# Patient Record
Sex: MALE | Race: WHITE | NOT HISPANIC OR LATINO
[De-identification: names, ages, dates, MRNs, and addresses within clinical notes are randomized per-mention and may not be internally consistent; named-entity substitution may affect disease eponyms.]

---

## 2017-01-18 ENCOUNTER — APPOINTMENT (OUTPATIENT)
Dept: SURGICAL ONCOLOGY | Facility: CLINIC | Age: 57
End: 2017-01-18

## 2017-01-18 VITALS
SYSTOLIC BLOOD PRESSURE: 160 MMHG | OXYGEN SATURATION: 98 % | DIASTOLIC BLOOD PRESSURE: 90 MMHG | WEIGHT: 230 LBS | RESPIRATION RATE: 18 BRPM | HEART RATE: 100 BPM | HEIGHT: 75 IN | BODY MASS INDEX: 28.6 KG/M2

## 2017-04-30 ENCOUNTER — FORM ENCOUNTER (OUTPATIENT)
Age: 57
End: 2017-04-30

## 2017-05-01 ENCOUNTER — APPOINTMENT (OUTPATIENT)
Dept: CT IMAGING | Facility: CLINIC | Age: 57
End: 2017-05-01

## 2017-05-01 ENCOUNTER — OUTPATIENT (OUTPATIENT)
Dept: OUTPATIENT SERVICES | Facility: HOSPITAL | Age: 57
LOS: 1 days | End: 2017-05-01
Payer: COMMERCIAL

## 2017-05-01 DIAGNOSIS — Z00.8 ENCOUNTER FOR OTHER GENERAL EXAMINATION: ICD-10-CM

## 2017-05-01 DIAGNOSIS — Z98.89 OTHER SPECIFIED POSTPROCEDURAL STATES: Chronic | ICD-10-CM

## 2017-05-01 DIAGNOSIS — K46.9 UNSPECIFIED ABDOMINAL HERNIA WITHOUT OBSTRUCTION OR GANGRENE: Chronic | ICD-10-CM

## 2017-05-01 DIAGNOSIS — Z90.49 ACQUIRED ABSENCE OF OTHER SPECIFIED PARTS OF DIGESTIVE TRACT: Chronic | ICD-10-CM

## 2017-05-02 PROCEDURE — 82565 ASSAY OF CREATININE: CPT

## 2017-05-02 PROCEDURE — 74177 CT ABD & PELVIS W/CONTRAST: CPT

## 2017-07-19 ENCOUNTER — APPOINTMENT (OUTPATIENT)
Dept: SURGICAL ONCOLOGY | Facility: CLINIC | Age: 57
End: 2017-07-19

## 2017-07-19 VITALS
RESPIRATION RATE: 12 BRPM | BODY MASS INDEX: 28.6 KG/M2 | HEIGHT: 75 IN | TEMPERATURE: 98.2 F | WEIGHT: 230 LBS | SYSTOLIC BLOOD PRESSURE: 120 MMHG | DIASTOLIC BLOOD PRESSURE: 83 MMHG | HEART RATE: 100 BPM | OXYGEN SATURATION: 98 %

## 2019-03-12 ENCOUNTER — TRANSCRIPTION ENCOUNTER (OUTPATIENT)
Age: 59
End: 2019-03-12

## 2019-04-03 ENCOUNTER — APPOINTMENT (OUTPATIENT)
Dept: SURGICAL ONCOLOGY | Facility: CLINIC | Age: 59
End: 2019-04-03

## 2019-06-26 ENCOUNTER — APPOINTMENT (OUTPATIENT)
Dept: SURGICAL ONCOLOGY | Facility: CLINIC | Age: 59
End: 2019-06-26
Payer: MEDICARE

## 2019-06-26 VITALS
HEIGHT: 75 IN | BODY MASS INDEX: 30.59 KG/M2 | SYSTOLIC BLOOD PRESSURE: 139 MMHG | DIASTOLIC BLOOD PRESSURE: 84 MMHG | WEIGHT: 246 LBS | HEART RATE: 99 BPM

## 2019-06-26 PROCEDURE — 99214 OFFICE O/P EST MOD 30 MIN: CPT

## 2019-06-26 NOTE — ASSESSMENT
[FreeTextEntry1] : 59 year-old male presents for follow up.  Initially referred by Hollie Paredes, who performed an x-lap, sigmoid resection, takedown of colovesical fistula and end colostomy (Hartmanns) for perforated diverticulitis on 8/3/16.  He wishes to discuss undergoing reversal of his colostomy.  \par \par

## 2019-06-26 NOTE — HISTORY OF PRESENT ILLNESS
[de-identified] : 59 year-old male presents for follow up.  Initially referred by Hollie Paredes, who performed an x-lap, sigmoid resection, takedown of colovesical fistula and end colostomy (Hartmanns) for perforated diverticulitis on 8/3/16.  He wishes to discuss undergoing reversal of his colostomy.  His medical history is notable for HTN, HLD and DM (controlled on Metformin).  He also has a h/o prostate cancer treated with radiation therapy.  Prior surgeries include a right-sided inguinal hernia repair by Dr. Knight. He has a family history of prostate cancer involving her brother.\par \par He is s/p cystoscopy, laparotomy, extensive MI and SBR on 11/29/16.  Intent of surgery was to reverse Shelley, however, due to extensive inflammation involving the posterior bladder trigone and rectal wall, decision was made to abort reversal.  Surgical pathology was benign.  \par \par He was referred to the [facility] for attempted takedown of colostomy on 6/29/17.  Unfortunately, extent of scarring prohibited the procedure and it was aborted.\par \par Preop CT performed in May 2017 revealed significant improvement in the colovesicular fistula.  There is no discrete drainable abscess.  There is persistently thickening posterior bladder wall with surrounding inflammatory changes, with no intravesical air to definitively suggest an active fistula.\par \par \par PCP: Dr. Jaiden Cochran ([phone])\par Referring MD: Dr. Browne\par

## 2019-06-26 NOTE — PHYSICAL EXAM
[Normal] : supple, no neck mass and thyroid not enlarged [Normal Neck Lymph Nodes] : normal neck lymph nodes  [Normal Supraclavicular Lymph Nodes] : normal supraclavicular lymph nodes [Normal Groin Lymph Nodes] : normal groin lymph nodes [Normal Axillary Lymph Nodes] : normal axillary lymph nodes [Normal] : grossly intact [de-identified] : LLQ colostomy with appliance- stoma viable

## 2019-06-26 NOTE — CONSULT LETTER
[Dear  ___] : Dear  [unfilled], [Courtesy Letter:] : I had the pleasure of seeing your patient, [unfilled], in my office today. [Sincerely,] : Sincerely, [DrBobbi  ___] : Dr. QUINTERO [FreeTextEntry2] : Naren Browne MD [FreeTextEntry1] : 59 year-old male presents for follow up.  Initially referred by Hollie Paredes, who performed an x-lap, sigmoid resection, takedown of colovesical fistula and end colostomy (Hartmanns) for perforated diverticulitis on 8/3/16.  He wishes to discuss undergoing reversal of his colostomy.  His medical history is notable for HTN, HLD and DM (controlled on Metformin).  He also has a h/o prostate cancer treated with radiation therapy.  Prior surgeries include a right-sided inguinal hernia repair by Dr. Knight. He has a family history of prostate cancer involving her brother.\par \par He is s/p cystoscopy, laparotomy, extensive MI and SBR on 11/29/16.  Intent of surgery was to reverse Shelley, however, due to extensive inflammation involving the posterior bladder trigone and rectal wall, decision was made to abort reversal.  Surgical pathology was benign.  \par \par He was referred to the [facility] for attempted takedown of colostomy on 6/29/17.  Unfortunately, extent of scarring prohibited the procedure and it was aborted.\par \par Preop CT performed in May 2017 revealed significant improvement in the colovesicular fistula.  There is no discrete drainable abscess.  There is persistently thickening posterior bladder wall with surrounding inflammatory changes, with no intravesical air to definitively suggest an active fistula.\par \par \par  [FreeTextEntry3] : Vel Parkinson MD, FACS, FASCRS\par , Department of Surgery\par Director of the [facility]\par , Minimally Invasive/Robotic Cancer Surgery, Central & Eastern Divisions\par Division of Surgical Oncology\par

## 2019-06-27 RX ORDER — PSYLLIUM HUSK 0.4 G
CAPSULE ORAL
Refills: 0 | Status: ACTIVE | COMMUNITY

## 2019-06-27 RX ORDER — HYDROCHLOROTHIAZIDE 25 MG/1
25 TABLET ORAL
Refills: 0 | Status: ACTIVE | COMMUNITY

## 2021-01-29 ENCOUNTER — APPOINTMENT (OUTPATIENT)
Dept: COLORECTAL SURGERY | Facility: CLINIC | Age: 61
End: 2021-01-29
Payer: MEDICARE

## 2021-01-29 VITALS
DIASTOLIC BLOOD PRESSURE: 90 MMHG | HEART RATE: 123 BPM | HEIGHT: 75 IN | WEIGHT: 244 LBS | TEMPERATURE: 98.2 F | BODY MASS INDEX: 30.34 KG/M2 | SYSTOLIC BLOOD PRESSURE: 190 MMHG

## 2021-01-29 DIAGNOSIS — I10 ESSENTIAL (PRIMARY) HYPERTENSION: ICD-10-CM

## 2021-01-29 DIAGNOSIS — Z80.9 FAMILY HISTORY OF MALIGNANT NEOPLASM, UNSPECIFIED: ICD-10-CM

## 2021-01-29 DIAGNOSIS — K62.7 RADIATION PROCTITIS: ICD-10-CM

## 2021-01-29 DIAGNOSIS — Z80.42 FAMILY HISTORY OF MALIGNANT NEOPLASM OF PROSTATE: ICD-10-CM

## 2021-01-29 PROCEDURE — 45300 PROCTOSIGMOIDOSCOPY DX: CPT

## 2021-01-29 PROCEDURE — 99204 OFFICE O/P NEW MOD 45 MIN: CPT | Mod: 25

## 2021-01-29 NOTE — ASSESSMENT
[FreeTextEntry1] : I have discussed with the patient that given his 2 prior attempts at unsuccessful reversal of this colostomy I do not support further attempts. The degree of fibrosis as described in prior operative reports increases the potential risk of secondary organ injury and complications as well as limits the success opportunities of reversal of colostomy. I recommended that he continue with his colostomy and defer further attempts at reversal. The risks, benefits and alternatives of treatment plan have been reviewed. All questions answered.

## 2021-01-29 NOTE — PHYSICAL EXAM
[Abdomen Masses] : No abdominal masses [Abdomen Tenderness] : ~T No ~M abdominal tenderness [Excoriation] : no perianal excoriation [Normal] : was normal [None] : no [de-identified] : Low healed midline incision with left lower quadrant colostomy [FreeTextEntry1] : A rigid proctosigmoidoscope was passed through he anus into the rectum to  8  cm. . The mucosal surface were inspected. 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 Moderate stenosis at 8 cm from the anal verge.

## 2021-01-29 NOTE — HISTORY OF PRESENT ILLNESS
[FreeTextEntry1] : 61 y/o M presents for evaluation of Shelley's reversal \par H/o prostate cancer, treated with radiation seeds in [place], [place]. Reports he has a plate placed near the pelvis r/t radiation causing area "being too soft" \par PSA trended with PCP\par \par Patient underwent exploratory laparotomy, takedown of colovesical fistula and Shelley's creation on 8/3/16 with Dr. Naren Browne at [facility] for perforated diverticulitis (1st episode)\par \par This procedure was followed by Colonoscopy, laparotomy, small bowel resection. MI, and cystoscopy and insertion of ureteral catheters on 11/29/16 with Dr. Vel Parkinson at [facility]. Per Dr. Parkinson's notes, intent of surgery was to reverse Shelley, however, due to extensive inflammation involving the posterior bladder trigone and rectal wall, decision was made to abort reversal.\par \par He also underwent a Shelley's reversal on 6/29/17 with Dr. Bruce Oconnell at [facility] but surgery was aborted 2/2 extensive scarring and in ability to create anastomosis. Consideration of pull-through but abandoned 2/2 risk of injury\par \par Last CT A/P completed 5/1/17\par - Significantly improved colovesicular fistula. Nonspecific soft tissue in the colovesical/coloprostatic space which may related to granulation fibrosis or phlegmon\par - No discrete drainable abscess. \par - Persistently thickened posterior bladder wall with surrounding inflammatory changes, no intravesical air to definitively suggest active fistula. \par \par Denies abdominal pain, nausea, vomiting, change in appetite or weight\par Denies bleeding or irritation from stoma. Changing appliance ~3 times per week\par BH: 2-5 times daily. Stools typically watery to paste like depending on diet. Rarely has a formed BM\par Working on dietary fiber intake. Drinking four 16 oz. bottles of water daily \par \par Last colonoscopy completed 2018, no abnormal findings per patient \par FMH of prostate cancer in 2 brothers. FMH of cancer in mother

## 2022-04-07 ENCOUNTER — APPOINTMENT (OUTPATIENT)
Dept: ORTHOPEDIC SURGERY | Facility: CLINIC | Age: 62
End: 2022-04-07
Payer: MEDICARE

## 2022-04-07 VITALS — WEIGHT: 235 LBS | BODY MASS INDEX: 29.22 KG/M2 | HEIGHT: 75 IN

## 2022-04-07 PROCEDURE — 99214 OFFICE O/P EST MOD 30 MIN: CPT | Mod: 25

## 2022-04-07 PROCEDURE — 20610 DRAIN/INJ JOINT/BURSA W/O US: CPT | Mod: RT

## 2022-04-07 NOTE — HISTORY OF PRESENT ILLNESS
[5] : 5 [Dull/Aching] : dull/aching [de-identified] : Has increased swelling and soreness again right knee. Also had swelling left hand, right ankle, PMD wants fluid sent to check for crystal [FreeTextEntry1] : rt knee [FreeTextEntry5] : patient reports swelling [de-identified] : none

## 2022-04-07 NOTE — PHYSICAL EXAM
[Right] : right knee [] : anterior tenderness [5___] : hamstring 5[unfilled]/5 [TWNoteComboBox7] : flexion 120 degrees [de-identified] : extension 0 degrees

## 2022-04-07 NOTE — PROCEDURE
[Large Joint Injection] : Large joint injection [Right] : of the right [Knee] : knee [Pain] : pain [Alcohol] : alcohol [Betadine] : betadine [Ethyl Chloride sprayed topically] : ethyl chloride sprayed topically [___ cc    3mg] :  Betamethasone (Celestone) ~Vcc of 3mg [___ cc    1%] : Lidocaine ~Vcc of 1%  [Effusion] : effusion [Crystals] : crystals [de-identified] : 60cc

## 2022-04-08 LAB
SYCRY CLARITY: ABNORMAL
SYCRY CLARITY: ABNORMAL
SYCRY COLOR: YELLOW
SYCRY COLOR: YELLOW
SYCRY ID: ABNORMAL
SYCRY ID: ABNORMAL
SYCRY TUBE: NORMAL
SYCRY TUBE: NORMAL

## 2022-04-21 ENCOUNTER — APPOINTMENT (OUTPATIENT)
Dept: ORTHOPEDIC SURGERY | Facility: CLINIC | Age: 62
End: 2022-04-21
Payer: MEDICARE

## 2022-04-21 PROCEDURE — 99213 OFFICE O/P EST LOW 20 MIN: CPT

## 2022-04-21 NOTE — PHYSICAL EXAM
[Right] : right knee [5___] : hamstring 5[unfilled]/5 [Negative] : negative Allyson's [] : no anterior tenderness [TWNoteComboBox7] : flexion 125 degrees [de-identified] : extension 0 degrees

## 2022-04-21 NOTE — HISTORY OF PRESENT ILLNESS
[5] : 5 [Dull/Aching] : dull/aching [FreeTextEntry5] : patient reports swelling [FreeTextEntry1] : rt knee [de-identified] : none [de-identified] : Has increased swelling and soreness again right knee. Also had swelling left hand, right ankle, PMD wants fluid sent to check for crystal

## 2022-04-21 NOTE — PROCEDURE
[Large Joint Injection] : Large joint injection [Right] : of the right [Knee] : knee [Pain] : pain [Alcohol] : alcohol [Betadine] : betadine [Ethyl Chloride sprayed topically] : ethyl chloride sprayed topically [___ cc    3mg] :  Betamethasone (Celestone) ~Vcc of 3mg [___ cc    1%] : Lidocaine ~Vcc of 1%  [Effusion] : effusion [Crystals] : crystals [de-identified] : 60cc

## 2022-04-21 NOTE — DISCUSSION/SUMMARY
[de-identified] : will discuss gouty diagnosis with PMD, may need to be placed on other medications

## 2022-06-28 ENCOUNTER — APPOINTMENT (OUTPATIENT)
Dept: ORTHOPEDIC SURGERY | Facility: CLINIC | Age: 62
End: 2022-06-28

## 2022-06-28 VITALS — HEIGHT: 75 IN | BODY MASS INDEX: 29.22 KG/M2 | WEIGHT: 235 LBS

## 2022-06-28 PROCEDURE — 20610 DRAIN/INJ JOINT/BURSA W/O US: CPT

## 2022-06-28 PROCEDURE — 99213 OFFICE O/P EST LOW 20 MIN: CPT | Mod: 25

## 2022-06-28 NOTE — PROCEDURE
[Large Joint Injection] : Large joint injection [Right] : of the right [Knee] : knee [Pain] : pain [Alcohol] : alcohol [Betadine] : betadine [Ethyl Chloride sprayed topically] : ethyl chloride sprayed topically [Inflammation] : inflammation [X-ray evidence of Osteoarthritis on this or prior visit] : x-ray evidence of Osteoarthritis on this or prior visit [Orthovisc] : Orthovisc [#1] : series #1 [] : Patient tolerated procedure well [Call if redness, pain or fever occur] : call if redness, pain or fever occur [Apply ice for 15min out of every hour for the next 12-24 hours as tolerated] : apply ice for 15 minutes out of every hour for the next 12-24 hours as tolerated [Patient was advised to rest the joint(s) for ____ days] : patient was advised to rest the joint(s) for [unfilled] days

## 2022-06-28 NOTE — HISTORY OF PRESENT ILLNESS
[5] : 5 [Dull/Aching] : dull/aching [de-identified] : 6/28/22- here with recurring right medial knee pain x a couple days. Requesting asp/injection. Concerned he will be standing prolonged for work. Worried knee may give out. No recent episodes of buckling. \par \par 4/28/22- Has increased swelling and soreness again right knee. Also had swelling left hand, right ankle, PMD wants fluid sent to check for crystal\par 12-1-21- right knee pain and swelling\par 9/8/21- Feels like the gels kicked in from a pain perspective, but swelled a bit\par 8/17/21- Went on treadmill, right knee swelled again\par 7/22/21: Here for R knee Orthovisc #4, swelling in the knee has returned\par History of Present Illness\par 12-1-21- Known right knee oa and recurrent effusions. does well with episodic asp/inject. was last asp in Sept last csi\par was in august. recently pain and swelling has returned. \par 7/5/2021: Pt here due to right knee swelling. Pt states that he recently began use of Aleve otc and has noted\par significant pain relief. Pt is inquiring regarding getting 2nd Orthovisc today.\par 3 weeks ago, pain has subsided. He has stiffness going up and down the steps or bending. No locking, clicking or giving\par out. Tylenol with some help. Has hx gout.\par 7/31/19- Had small twist to right knee 2 days ago, has pain and swelling. Has h/o gout, had right knee scoped for\par medial meniscal tear 2 years ago, has been good up til now. [] : no [FreeTextEntry1] : rt knee [FreeTextEntry5] : patient reports swelling [de-identified] : none

## 2022-06-28 NOTE — ASSESSMENT
[FreeTextEntry1] : Too soon to repeat CSI, may have another csi after 7/28/22.\par Orthovisc #1 given today.\par Post injection instructions.\par Breaks from prolonged standing every hour.\par Saw PMD - Dr. Cochran who started him on a med for his gout, will f/u one month with him as scheduled.\par Discussed avoiding foods for gout prevention.\par Return one week to continue series.

## 2022-06-28 NOTE — PHYSICAL EXAM
[Right] : right knee [5___] : hamstring 5[unfilled]/5 [Negative] : negative Allyson's [] : no pain with valgus stress [FreeTextEntry9] : medial pain with flexion and extension [TWNoteComboBox7] : flexion 125 degrees [de-identified] : extension 0 degrees

## 2022-07-07 ENCOUNTER — APPOINTMENT (OUTPATIENT)
Dept: ORTHOPEDIC SURGERY | Facility: CLINIC | Age: 62
End: 2022-07-07

## 2022-07-07 DIAGNOSIS — M25.561 PAIN IN RIGHT KNEE: ICD-10-CM

## 2022-07-07 PROCEDURE — 20610 DRAIN/INJ JOINT/BURSA W/O US: CPT | Mod: RT

## 2022-07-07 NOTE — HISTORY OF PRESENT ILLNESS
[5] : 5 [Dull/Aching] : dull/aching [de-identified] : 7-7-22- for continued orthovisc injection right knee #2\par \par 6/28/22- here with recurring right medial knee pain x a couple days. Requesting asp/injection. Concerned he will be standing prolonged for work. Worried knee may give out. No recent episodes of buckling. \par \par 4/28/22- Has increased swelling and soreness again right knee. Also had swelling left hand, right ankle, PMD wants fluid sent to check for crystal\par 12-1-21- right knee pain and swelling\par 9/8/21- Feels like the gels kicked in from a pain perspective, but swelled a bit\par 8/17/21- Went on treadmill, right knee swelled again\par 7/22/21: Here for R knee Orthovisc #4, swelling in the knee has returned\par History of Present Illness\par 12-1-21- Known right knee oa and recurrent effusions. does well with episodic asp/inject. was last asp in Sept last csi\par was in august. recently pain and swelling has returned. \par 7/5/2021: Pt here due to right knee swelling. Pt states that he recently began use of Aleve otc and has noted\par significant pain relief. Pt is inquiring regarding getting 2nd Orthovisc today.\par 3 weeks ago, pain has subsided. He has stiffness going up and down the steps or bending. No locking, clicking or giving\par out. Tylenol with some help. Has hx gout.\par 7/31/19- Had small twist to right knee 2 days ago, has pain and swelling. Has h/o gout, had right knee scoped for\par medial meniscal tear 2 years ago, has been good up til now. [] : no [FreeTextEntry1] : rt knee [FreeTextEntry5] : patient reports swelling [de-identified] : none

## 2022-07-07 NOTE — PROCEDURE
[Large Joint Injection] : Large joint injection [Right] : of the right [Knee] : knee [Pain] : pain [Inflammation] : inflammation [X-ray evidence of Osteoarthritis on this or prior visit] : x-ray evidence of Osteoarthritis on this or prior visit [Alcohol] : alcohol [Betadine] : betadine [Ethyl Chloride sprayed topically] : ethyl chloride sprayed topically [Orthovisc] : Orthovisc [#2] : series #2 [] : Patient tolerated procedure well [Call if redness, pain or fever occur] : call if redness, pain or fever occur [Apply ice for 15min out of every hour for the next 12-24 hours as tolerated] : apply ice for 15 minutes out of every hour for the next 12-24 hours as tolerated [Patient was advised to rest the joint(s) for ____ days] : patient was advised to rest the joint(s) for [unfilled] days

## 2022-07-14 ENCOUNTER — APPOINTMENT (OUTPATIENT)
Dept: ORTHOPEDIC SURGERY | Facility: CLINIC | Age: 62
End: 2022-07-14

## 2022-07-14 PROCEDURE — 20610 DRAIN/INJ JOINT/BURSA W/O US: CPT

## 2022-07-14 PROCEDURE — 99213 OFFICE O/P EST LOW 20 MIN: CPT | Mod: 25

## 2022-07-14 NOTE — PHYSICAL EXAM
[Right] : right knee [5___] : hamstring 5[unfilled]/5 [Negative] : negative Allyson's [] : no pain with valgus stress [FreeTextEntry9] : medial pain with flexion and extension [TWNoteComboBox7] : flexion 125 degrees [de-identified] : extension 0 degrees

## 2022-07-14 NOTE — PROCEDURE
[Large Joint Injection] : Large joint injection [Right] : of the right [Knee] : knee [Pain] : pain [Inflammation] : inflammation [X-ray evidence of Osteoarthritis on this or prior visit] : x-ray evidence of Osteoarthritis on this or prior visit [Alcohol] : alcohol [Betadine] : betadine [Ethyl Chloride sprayed topically] : ethyl chloride sprayed topically [Orthovisc] : Orthovisc [#3] : series #3 [] : Patient tolerated procedure well [Call if redness, pain or fever occur] : call if redness, pain or fever occur [Apply ice for 15min out of every hour for the next 12-24 hours as tolerated] : apply ice for 15 minutes out of every hour for the next 12-24 hours as tolerated [Patient was advised to rest the joint(s) for ____ days] : patient was advised to rest the joint(s) for [unfilled] days

## 2022-07-14 NOTE — HISTORY OF PRESENT ILLNESS
[5] : 5 [Dull/Aching] : dull/aching [de-identified] : 7-7-22- for continued orthovisc injection right knee #2\par \par 6/28/22- here with recurring right medial knee pain x a couple days. Requesting asp/injection. Concerned he will be standing prolonged for work. Worried knee may give out. No recent episodes of buckling. \par \par 4/28/22- Has increased swelling and soreness again right knee. Also had swelling left hand, right ankle, PMD wants fluid sent to check for crystal\par 12-1-21- right knee pain and swelling\par 9/8/21- Feels like the gels kicked in from a pain perspective, but swelled a bit\par 8/17/21- Went on treadmill, right knee swelled again\par 7/22/21: Here for R knee Orthovisc #4, swelling in the knee has returned\par History of Present Illness\par 12-1-21- Known right knee oa and recurrent effusions. does well with episodic asp/inject. was last asp in Sept last csi\par was in august. recently pain and swelling has returned. \par 7/5/2021: Pt here due to right knee swelling. Pt states that he recently began use of Aleve otc and has noted\par significant pain relief. Pt is inquiring regarding getting 2nd Orthovisc today.\par 3 weeks ago, pain has subsided. He has stiffness going up and down the steps or bending. No locking, clicking or giving\par out. Tylenol with some help. Has hx gout.\par 7/31/19- Had small twist to right knee 2 days ago, has pain and swelling. Has h/o gout, had right knee scoped for\par medial meniscal tear 2 years ago, has been good up til now. [] : no [FreeTextEntry1] : rt knee [FreeTextEntry5] : patient reports swelling [de-identified] : none

## 2022-07-21 ENCOUNTER — APPOINTMENT (OUTPATIENT)
Dept: ORTHOPEDIC SURGERY | Facility: CLINIC | Age: 62
End: 2022-07-21

## 2022-07-21 PROCEDURE — 99213 OFFICE O/P EST LOW 20 MIN: CPT | Mod: 25

## 2022-07-21 PROCEDURE — 20610 DRAIN/INJ JOINT/BURSA W/O US: CPT

## 2022-07-21 NOTE — PHYSICAL EXAM
[Right] : right knee [5___] : hamstring 5[unfilled]/5 [Negative] : negative Allyson's [] : no pain with valgus stress [FreeTextEntry9] : medial pain with flexion and extension [TWNoteComboBox7] : flexion 125 degrees [de-identified] : extension 0 degrees

## 2022-07-21 NOTE — PROCEDURE
[Large Joint Injection] : Large joint injection [Right] : of the right [Knee] : knee [Pain] : pain [Inflammation] : inflammation [X-ray evidence of Osteoarthritis on this or prior visit] : x-ray evidence of Osteoarthritis on this or prior visit [Alcohol] : alcohol [Betadine] : betadine [Ethyl Chloride sprayed topically] : ethyl chloride sprayed topically [Orthovisc] : Orthovisc [#4] : series #4 [] : Patient tolerated procedure well [Call if redness, pain or fever occur] : call if redness, pain or fever occur [Apply ice for 15min out of every hour for the next 12-24 hours as tolerated] : apply ice for 15 minutes out of every hour for the next 12-24 hours as tolerated [Patient was advised to rest the joint(s) for ____ days] : patient was advised to rest the joint(s) for [unfilled] days

## 2022-07-21 NOTE — HISTORY OF PRESENT ILLNESS
[5] : 5 [Dull/Aching] : dull/aching [de-identified] : 7-7-22- for continued orthovisc injection right knee #2\par \par 6/28/22- here with recurring right medial knee pain x a couple days. Requesting asp/injection. Concerned he will be standing prolonged for work. Worried knee may give out. No recent episodes of buckling. \par \par 4/28/22- Has increased swelling and soreness again right knee. Also had swelling left hand, right ankle, PMD wants fluid sent to check for crystal\par 12-1-21- right knee pain and swelling\par 9/8/21- Feels like the gels kicked in from a pain perspective, but swelled a bit\par 8/17/21- Went on treadmill, right knee swelled again\par 7/22/21: Here for R knee Orthovisc #4, swelling in the knee has returned\par History of Present Illness\par 12-1-21- Known right knee oa and recurrent effusions. does well with episodic asp/inject. was last asp in Sept last csi\par was in august. recently pain and swelling has returned. \par 7/5/2021: Pt here due to right knee swelling. Pt states that he recently began use of Aleve otc and has noted\par significant pain relief. Pt is inquiring regarding getting 2nd Orthovisc today.\par 3 weeks ago, pain has subsided. He has stiffness going up and down the steps or bending. No locking, clicking or giving\par out. Tylenol with some help. Has hx gout.\par 7/31/19- Had small twist to right knee 2 days ago, has pain and swelling. Has h/o gout, had right knee scoped for\par medial meniscal tear 2 years ago, has been good up til now. [] : no [FreeTextEntry1] : rt knee [FreeTextEntry5] : patient reports swelling [de-identified] : none

## 2023-03-03 ENCOUNTER — APPOINTMENT (OUTPATIENT)
Dept: ORTHOPEDIC SURGERY | Facility: CLINIC | Age: 63
End: 2023-03-03
Payer: MEDICARE

## 2023-03-03 VITALS — HEIGHT: 75 IN | BODY MASS INDEX: 29.22 KG/M2 | WEIGHT: 235 LBS

## 2023-03-03 DIAGNOSIS — M10.9 GOUT, UNSPECIFIED: ICD-10-CM

## 2023-03-03 DIAGNOSIS — M17.11 UNILATERAL PRIMARY OSTEOARTHRITIS, RIGHT KNEE: ICD-10-CM

## 2023-03-03 DIAGNOSIS — M25.461 EFFUSION, RIGHT KNEE: ICD-10-CM

## 2023-03-03 PROCEDURE — 99213 OFFICE O/P EST LOW 20 MIN: CPT | Mod: 25

## 2023-03-03 PROCEDURE — 20610 DRAIN/INJ JOINT/BURSA W/O US: CPT | Mod: RT

## 2023-03-03 NOTE — HISTORY OF PRESENT ILLNESS
[5] : 5 [Dull/Aching] : dull/aching [de-identified] : \par 3/3/23-knee swelled again\par 7-21-22 for continued orthovisc injection right knee #3\par 7-14-22- for continued orthovisc injection right knee #3\par 7-7-22- for continued orthovisc injection right knee #2\par 6/28/22- here with recurring right medial knee pain x a couple days. Requesting asp/injection. Concerned he will be standing prolonged for work. Worried knee may give out. No recent episodes of buckling. \par 4/28/22- Has increased swelling and soreness again right knee. Also had swelling left hand, right ankle, PMD wants fluid sent to check for crystal\par 12-1-21- right knee pain and swelling\par 9/8/21- Feels like the gels kicked in from a pain perspective, but swelled a bit\par 8/17/21- Went on treadmill, right knee swelled again\par 7/22/21: Here for R knee Orthovisc #4, swelling in the knee has returned\par History of Present Illness\par 12-1-21- Known right knee oa and recurrent effusions. does well with episodic asp/inject. was last asp in Sept last csi\par was in august. recently pain and swelling has returned. \par 7/5/2021: Pt here due to right knee swelling. Pt states that he recently began use of Aleve otc and has noted\par significant pain relief. Pt is inquiring regarding getting 2nd Orthovisc today.\par 3 weeks ago, pain has subsided. He has stiffness going up and down the steps or bending. No locking, clicking or giving\par out. Tylenol with some help. Has hx gout.\par 7/31/19- Had small twist to right knee 2 days ago, has pain and swelling. Has h/o gout, had right knee scoped for\par medial meniscal tear 2 years ago, has been good up til now. [] : no [FreeTextEntry1] : rt knee [FreeTextEntry5] : patient reports swelling [de-identified] : none

## 2023-03-03 NOTE — PROCEDURE
[Large Joint Injection] : Large joint injection [Right] : of the right [Knee] : knee [Pain] : pain [Inflammation] : inflammation [X-ray evidence of Osteoarthritis on this or prior visit] : x-ray evidence of Osteoarthritis on this or prior visit [Alcohol] : alcohol [Betadine] : betadine [Ethyl Chloride sprayed topically] : ethyl chloride sprayed topically [] : Patient tolerated procedure well [Call if redness, pain or fever occur] : call if redness, pain or fever occur [Apply ice for 15min out of every hour for the next 12-24 hours as tolerated] : apply ice for 15 minutes out of every hour for the next 12-24 hours as tolerated [Patient was advised to rest the joint(s) for ____ days] : patient was advised to rest the joint(s) for [unfilled] days [___ cc    6mg] :  Betamethasone (Celestone) ~Vcc of 6mg [___ cc    1%] : Lidocaine ~Vcc of 1%  [Effusion] : effusion [de-identified] : 60cc [de-identified] : cloudy

## 2023-03-03 NOTE — PHYSICAL EXAM
[Right] : right knee [5___] : hamstring 5[unfilled]/5 [Negative] : negative Allyson's [] : no pain with valgus stress [FreeTextEntry9] : medial pain with flexion and extension [TWNoteComboBox7] : flexion 125 degrees [de-identified] : extension 0 degrees

## 2023-06-05 ENCOUNTER — APPOINTMENT (OUTPATIENT)
Dept: ORTHOPEDIC SURGERY | Facility: CLINIC | Age: 63
End: 2023-06-05
Payer: MEDICARE

## 2023-06-05 VITALS — WEIGHT: 235 LBS | BODY MASS INDEX: 29.22 KG/M2 | HEIGHT: 75 IN

## 2023-06-05 DIAGNOSIS — M23.92 UNSPECIFIED INTERNAL DERANGEMENT OF LEFT KNEE: ICD-10-CM

## 2023-06-05 DIAGNOSIS — M25.462 EFFUSION, LEFT KNEE: ICD-10-CM

## 2023-06-05 DIAGNOSIS — M17.12 UNILATERAL PRIMARY OSTEOARTHRITIS, LEFT KNEE: ICD-10-CM

## 2023-06-05 PROCEDURE — 20610 DRAIN/INJ JOINT/BURSA W/O US: CPT | Mod: LT

## 2023-06-05 PROCEDURE — 99213 OFFICE O/P EST LOW 20 MIN: CPT | Mod: 25

## 2023-06-05 PROCEDURE — 73562 X-RAY EXAM OF KNEE 3: CPT | Mod: LT

## 2023-06-05 NOTE — ASSESSMENT
[FreeTextEntry1] : will aspirate and administer csi left knee see how he does\par get him in hinged knee brace for ambulation\par possible ha inj if symptoms warrant

## 2023-06-05 NOTE — HISTORY OF PRESENT ILLNESS
[Sudden] : sudden [Radiating] : radiating [Frequent] : frequent [de-identified] : 6-5-23- known left knee oa has had prior arthroscopy. states yesterday getting up from seated position felt a pop and has had pain and swelling since ambulating with a limp. He is wearing a wrap around support  [] : no [FreeTextEntry1] : left knee  [FreeTextEntry3] : 6/4/23 [FreeTextEntry5] : patient felt a pop while getting into his car.  [FreeTextEntry7] : into the calf

## 2023-06-05 NOTE — PROCEDURE
[Large Joint Injection] : Large joint injection [Left] : of the left [Knee] : knee [Pain] : pain [Inflammation] : inflammation [X-ray evidence of Osteoarthritis on this or prior visit] : x-ray evidence of Osteoarthritis on this or prior visit [Alcohol] : alcohol [Betadine] : betadine [Ethyl Chloride sprayed topically] : ethyl chloride sprayed topically [Sterile technique used] : sterile technique used [___ cc    6mg] :  Betamethasone (Celestone) ~Vcc of 6mg [___ cc    0.5%] : Bupivacaine (Marcaine) ~Vcc of 0.5%  [Effusion] : effusion [] : Patient tolerated procedure well [Call if redness, pain or fever occur] : call if redness, pain or fever occur [Apply ice for 15min out of every hour for the next 12-24 hours as tolerated] : apply ice for 15 minutes out of every hour for the next 12-24 hours as tolerated [Patient was advised to rest the joint(s) for ____ days] : patient was advised to rest the joint(s) for [unfilled] days [Previous OTC use and PT nontherapeutic] : patient has tried OTC's including aspirin, Ibuprofen, Aleve, etc or prescription NSAIDS, and/or exercises at home and/or physical therapy without satisfactory response [Patient had decreased mobility in the joint] : patient had decreased mobility in the joint [Risks, benefits, alternatives discussed / Verbal consent obtained] : the risks benefits, and alternatives have been discussed, and verbal consent was obtained [de-identified] : 45 cc clear yellow

## 2023-06-05 NOTE — IMAGING
[Left] : left knee [AP] : anteroposterior [Lateral] : lateral [[place]] : skyline [advanced tricompartmental OA with medial compartment narrowing and varus alignment] : advanced tricompartmental OA with medial compartment narrowing and varus alignment

## 2023-06-05 NOTE — PHYSICAL EXAM
[NL (0)] : extension 0 degrees [5___] : hamstring 5[unfilled]/5 [Positive] : positive Bia [] : negative Lachmann [Left] : left knee [AP] : anteroposterior [Lateral] : lateral [Degenerative change] : Degenerative change [TWNoteComboBox7] : flexion 90 degrees

## 2024-01-11 ENCOUNTER — APPOINTMENT (OUTPATIENT)
Dept: SURGICAL ONCOLOGY | Facility: CLINIC | Age: 64
End: 2024-01-11
Payer: MEDICARE

## 2024-01-11 ENCOUNTER — NON-APPOINTMENT (OUTPATIENT)
Age: 64
End: 2024-01-11

## 2024-01-11 VITALS
SYSTOLIC BLOOD PRESSURE: 142 MMHG | BODY MASS INDEX: 29.22 KG/M2 | DIASTOLIC BLOOD PRESSURE: 80 MMHG | HEIGHT: 75 IN | OXYGEN SATURATION: 98 % | HEART RATE: 113 BPM | WEIGHT: 235 LBS

## 2024-01-11 DIAGNOSIS — K57.92 DIVERTICULITIS OF INTESTINE, PART UNSPECIFIED, W/OUT PERFORATION OR ABSCESS W/OUT BLEEDING: ICD-10-CM

## 2024-01-11 PROCEDURE — 99203 OFFICE O/P NEW LOW 30 MIN: CPT

## 2024-01-15 NOTE — HISTORY OF PRESENT ILLNESS
[de-identified] : Mr. Rawls is a 62 y/o male with a history of sigmoid diverticulitis/colovesical fistula who is s/p Hewitt's procedure 08/2016. He is s/p 2 aborted reversal attempts in 11/2016 by Dr. Parkinson and again 06/29/2017 at [facility] by Dr. Oconnell due to severe pelvic fibrosis/scar tissue. He had a colonoscopy 2018 without significant findings. He presents for evaluation of colostomy prolapse and stoma appliance related skin irritation. He is tolerating diet well and denies abdominal pain.  He is able to reduce prolapse and is managing colostomy well.

## 2024-01-15 NOTE — REVIEW OF SYSTEMS
[Negative] : Heme/Lymph [FreeTextEntry8] : colostomy prolapse with surrounding abdominal wall bulging

## 2024-01-15 NOTE — PHYSICAL EXAM
[FreeTextEntry1] : Abdomen: soft, nontender.  Viable stoma prolapse about 3 inches/reducible with stool output.  Large surrounding parastomal bulge is consistent with parastomal hernia. [Normal] : supple, no neck mass and thyroid not enlarged [Normal Neck Lymph Nodes] : normal neck lymph nodes  [Normal Supraclavicular Lymph Nodes] : normal supraclavicular lymph nodes [Normal Groin Lymph Nodes] : normal groin lymph nodes [Normal Axillary Lymph Nodes] : normal axillary lymph nodes [Normal] : oriented to person, place and time, with appropriate affect

## 2024-01-15 NOTE — ASSESSMENT
[FreeTextEntry1] : Obtain CT evaluation of parastomal hernia. Poor candidate for stoma reversal - reassess after CT. Refer to colorectal surgery, Dr. Tan Ramos, for colonoscopy/rectal stump evaluation after CT.

## 2024-01-24 ENCOUNTER — APPOINTMENT (OUTPATIENT)
Dept: CT IMAGING | Facility: CLINIC | Age: 64
End: 2024-01-24
Payer: MEDICARE

## 2024-01-24 PROCEDURE — 74177 CT ABD & PELVIS W/CONTRAST: CPT

## 2024-02-21 ENCOUNTER — APPOINTMENT (OUTPATIENT)
Dept: DERMATOLOGY | Facility: CLINIC | Age: 64
End: 2024-02-21
Payer: MEDICARE

## 2024-02-21 DIAGNOSIS — L30.9 DERMATITIS, UNSPECIFIED: ICD-10-CM

## 2024-02-21 PROCEDURE — 99204 OFFICE O/P NEW MOD 45 MIN: CPT

## 2024-02-21 RX ORDER — CLOBETASOL PROPIONATE 0.5 MG/G
0.05 OINTMENT TOPICAL TWICE DAILY
Qty: 2 | Refills: 2 | Status: ACTIVE | COMMUNITY
Start: 2024-02-21 | End: 1900-01-01

## 2024-02-22 ENCOUNTER — APPOINTMENT (OUTPATIENT)
Dept: SURGICAL ONCOLOGY | Facility: CLINIC | Age: 64
End: 2024-02-22
Payer: MEDICARE

## 2024-02-22 VITALS
WEIGHT: 230 LBS | HEART RATE: 109 BPM | DIASTOLIC BLOOD PRESSURE: 83 MMHG | BODY MASS INDEX: 28.6 KG/M2 | SYSTOLIC BLOOD PRESSURE: 165 MMHG | OXYGEN SATURATION: 98 % | HEIGHT: 75 IN

## 2024-02-22 DIAGNOSIS — C85.97: ICD-10-CM

## 2024-02-22 DIAGNOSIS — K43.5 PARASTOMAL HERNIA WITHOUT OBSTRUCTION OR GANGRENE: ICD-10-CM

## 2024-02-22 DIAGNOSIS — R16.1 SPLENOMEGALY, NOT ELSEWHERE CLASSIFIED: ICD-10-CM

## 2024-02-22 PROCEDURE — 99213 OFFICE O/P EST LOW 20 MIN: CPT

## 2024-02-28 PROBLEM — R16.1 SPLENOMEGALY: Status: ACTIVE | Noted: 2024-02-28

## 2024-02-28 PROBLEM — C85.97: Status: ACTIVE | Noted: 2024-02-28

## 2024-02-28 PROBLEM — K43.5 PARASTOMAL HERNIA: Status: ACTIVE | Noted: 2024-01-15

## 2024-02-28 NOTE — PHYSICAL EXAM
[FreeTextEntry1] : Abdomen: soft, nontender.  Viable stoma prolapse about 3 inches/reducible with stool output.  Large surrounding parastomal bulge is consistent with parastomal hernia. [Normal] : supple, no neck mass and thyroid not enlarged [Normal Neck Lymph Nodes] : normal neck lymph nodes  [Normal Groin Lymph Nodes] : normal groin lymph nodes [Normal Axillary Lymph Nodes] : normal axillary lymph nodes [Normal Supraclavicular Lymph Nodes] : normal supraclavicular lymph nodes [Normal] : oriented to person, place and time, with appropriate affect

## 2024-02-28 NOTE — HISTORY OF PRESENT ILLNESS
[de-identified] : Mr. Rawls is a 64 y/o male with a history of sigmoid diverticulitis/colovesical fistula who is s/p Hewitt's procedure 08/2016. He is s/p 2 aborted reversal attempts in 11/2016 by Dr. Parkinson and again 06/29/2017 at [facility] by Dr. Oconnell due to severe pelvic fibrosis/scar tissue. He had a colonoscopy 2018 without significant findings. He presents for evaluation of colostomy prolapse and stoma appliance related skin irritation. He is tolerating diet well and denies abdominal pain.  He is able to reduce prolapse and is managing colostomy well.  02/22/2024: Patient reports feeling well.  His colostomy is functioning despite significant prolapse.  He has minimal improved in skin excoriation/ulceration around stoma. CT abdomen/pelvis 01/24/2024 shows new periportal edema/thickening, splenomegaly and bilateral diffuse infiltrative soft tissue throughout collecting system and kidneys.  He has persistent rectovesical fistula.  Colostomy prolapse and large parastomal hernia seen.

## 2024-02-28 NOTE — ASSESSMENT
[FreeTextEntry1] : Persistent rectovesical fistula - not a candidate colostomy reversal. New soft tissue infiltration around kidneys with new splenomegaly - findings concerning for lymphoma.  Will plan PET/CT scan for evaluation and to determine site for biopsy based on FDG activity. Prolapsed colostomy with parastomal hernia - will refer to Dr. Ramos - colorectal surgery for revisional surgery. Follow up after PET/CT scan.

## 2024-02-28 NOTE — REASON FOR VISIT
[Follow-Up Visit] : a follow-up visit for [FreeTextEntry2] : colostomy prolapse with large parastomal hernia

## 2024-03-01 NOTE — HISTORY OF PRESENT ILLNESS
[FreeTextEntry1] : np [de-identified] : 62 yo M w/ hx of s igmoid diverticulitis/colovesical fistula who is s/p Hewitt's procedure 08/2016 and multiple subsequent failed reversal attempts, rcent course comploicated by prolapsed colostomy due to reducible hernia, here for new perstomal skin changes.   Follows w/ Dr. Vargas (colorectal sx), Lucia Guevara (ostomy wound care)  Pt denies pain, not entirely clear how long peristomal ulcer has been present, but says it occurred at same time as hernia, has more recently been using a larger wafer which has seemed to help.

## 2024-03-01 NOTE — PHYSICAL EXAM
[FreeTextEntry3] : peristomal ulceration most prominent on right side but laso present on left and inferiorly, no undermining, minimal discharge, no inflamed border, non tender, red beefy granulation tissue at base, stoma appears prolapsed, large background hernia

## 2024-03-01 NOTE — ASSESSMENT
[FreeTextEntry1] : #favor ulceration 2/2 irritant dermatitis-- likely triggered by stoma prolapse from large hernia Low suspicion today for PG given lack of pain, undermining/red border & rapid progression New diagnosis with uncertain prognosis - education, counseling - START clobetasol ointment BID to affected area only, no more than 2 weeks, avoid face and groin - r/b/a topical steroids were discussed including but not limited to thinning of the skin, atrophy and dyspigmentation. - photos taken - if worsening noted at NV in 3 weeks consider biopsy - surgical correction of hernia likely to improve peristomal wound

## 2024-03-14 ENCOUNTER — APPOINTMENT (OUTPATIENT)
Dept: COLORECTAL SURGERY | Facility: CLINIC | Age: 64
End: 2024-03-14
Payer: MEDICARE

## 2024-03-14 VITALS
HEIGHT: 75 IN | TEMPERATURE: 99 F | RESPIRATION RATE: 12 BRPM | HEART RATE: 100 BPM | SYSTOLIC BLOOD PRESSURE: 142 MMHG | BODY MASS INDEX: 28.6 KG/M2 | WEIGHT: 230 LBS | DIASTOLIC BLOOD PRESSURE: 72 MMHG

## 2024-03-14 DIAGNOSIS — Z87.39 PERSONAL HISTORY OF OTHER DISEASES OF THE MUSCULOSKELETAL SYSTEM AND CONNECTIVE TISSUE: ICD-10-CM

## 2024-03-14 DIAGNOSIS — Z78.9 OTHER SPECIFIED HEALTH STATUS: ICD-10-CM

## 2024-03-14 DIAGNOSIS — K94.09 OTHER COMPLICATIONS OF COLOSTOMY: ICD-10-CM

## 2024-03-14 DIAGNOSIS — Z85.46 PERSONAL HISTORY OF MALIGNANT NEOPLASM OF PROSTATE: ICD-10-CM

## 2024-03-14 DIAGNOSIS — E11.9 TYPE 2 DIABETES MELLITUS W/OUT COMPLICATIONS: ICD-10-CM

## 2024-03-14 PROCEDURE — 99203 OFFICE O/P NEW LOW 30 MIN: CPT

## 2024-03-14 RX ORDER — COLCHICINE 0.6 MG/1
0.6 CAPSULE ORAL
Refills: 0 | Status: ACTIVE | COMMUNITY

## 2024-03-14 RX ORDER — ALLOPURINOL 100 MG/1
100 TABLET ORAL
Refills: 0 | Status: ACTIVE | COMMUNITY

## 2024-03-14 RX ORDER — TURM/GING/BOS/YUC/WIL/CHAM/HOR 100-100 MG
TABLET ORAL
Refills: 0 | Status: ACTIVE | COMMUNITY

## 2024-03-14 NOTE — ASSESSMENT
[FreeTextEntry1] : Colostomy prolapse and parastomal hernia -We discussed treatment options for patient's colostomy prolapse and parastomal hernia. This included local repairs as well as mesh repairs and re\re siting of the stoma. Risks and benefits of each of these options were reviewed including postprocedural discomfort, pain and recurrence rates -Patient expressed interest in proceeding with a local repair although a higher recurrence rate would be noted. This would include partial colon resection with possible keyhole mesh placement and reduction of any involved small intestines -Patient wishes to proceed and we'll schedule his convenience -CT scan images and report reviewed. We'll discuss with surgical oncology treatment going forward for lymphadenopathy before definitive scheduling

## 2024-03-14 NOTE — CONSULT LETTER
[Dear  ___] : Dear  [unfilled], [Consult Letter:] : I had the pleasure of evaluating your patient, [unfilled]. [Please see my note below.] : Please see my note below. [Sincerely,] : Sincerely, [FreeTextEntry2] : Tan Vargas [Consult Closing:] : Thank you very much for allowing me to participate in the care of this patient.  If you have any questions, please do not hesitate to contact me. [FreeTextEntry3] : Tan Ramos MD FACS Chief Colon and Rectal Surgery [facility] [facility]

## 2024-03-14 NOTE — REVIEW OF SYSTEMS
[As Noted in HPI] : as noted in HPI [Nocturia] : nocturia [Negative] : Psychiatric [Chest Pain] : no chest pain [Shortness Of Breath] : no shortness of breath [Easy Bleeding] : no tendency for easy bleeding [FreeTextEntry9] : joint pain [Easy Bruising] : no tendency for easy bruising [de-identified] : denies glucose FS

## 2024-03-14 NOTE — HISTORY OF PRESENT ILLNESS
[FreeTextEntry1] : 62yo WM, s/p Shelley procedure in 2016. Has had 2 aborted attempts at reversal. Presents for evaluation of parastomal hernia And colostomy prolapse(denies abdominal pain. +ostomy output).  The stoma is working without event however patient is having difficulty with pouching. He denies abdominal pain no fevers or chills no nausea or vomiting. No aggravating factors

## 2024-03-14 NOTE — PHYSICAL EXAM
[Normal Breath Sounds] : Normal breath sounds [Normal Heart Sounds] : normal heart sounds [Normal Rate and Rhythm] : normal rate and rhythm [No Edema] : No edema [Alert] : alert [Oriented to Person] : oriented to person [Oriented to Place] : oriented to place [Oriented to Time] : oriented to time [Calm] : calm [de-identified] : round soft +BS LLQ ostomy/parastomal hernia.Colostomy prolapse easily reducible Healed scars [de-identified] : NC/AT [de-identified] : +ROM [de-identified] : intact

## 2024-03-15 RX ORDER — METRONIDAZOLE 250 MG/1
250 TABLET ORAL
Qty: 3 | Refills: 0 | Status: ACTIVE | COMMUNITY
Start: 2024-03-15 | End: 1900-01-01

## 2024-03-15 RX ORDER — NEOMYCIN SULFATE 500 MG/1
500 TABLET ORAL
Qty: 3 | Refills: 0 | Status: ACTIVE | COMMUNITY
Start: 2024-03-15 | End: 1900-01-01

## 2024-03-20 ENCOUNTER — APPOINTMENT (OUTPATIENT)
Dept: DERMATOLOGY | Facility: CLINIC | Age: 64
End: 2024-03-20
Payer: MEDICARE

## 2024-03-20 PROCEDURE — 99214 OFFICE O/P EST MOD 30 MIN: CPT

## 2024-03-20 RX ORDER — CLOBETASOL PROPIONATE 0.05 G/ML
0.05 SPRAY TOPICAL TWICE DAILY
Qty: 1 | Refills: 3 | Status: ACTIVE | COMMUNITY
Start: 2024-03-20 | End: 1900-01-01

## 2024-03-25 NOTE — HISTORY OF PRESENT ILLNESS
[FreeTextEntry1] : f/u [de-identified] : 64 yo M w/ hx of sigmoid diverticulitis/colovesical fistula who is s/p Hewitt's procedure 08/2016 and multiple subsequent failed reversal attempts, rcent course comploicated by prolapsed colostomy due to reducible hernia, here for peristomal ulceration  Follows w/ Dr. Vargas (colorectal sx), Lucia Guevara (ostomy wound care)   2/21/2024, at  possible irritant vs PG suspected, pt says he was improving with clobetasol however had the wrong wafers and rash worsened, pending hernia repair late March. No pain   HISTORY: Pt denies pain, not entirely clear how long peristomal ulcer has been present, but says it occurred at same time as hernia, has more recently been using a larger wafer which has seemed to help.

## 2024-03-25 NOTE — ASSESSMENT
[FreeTextEntry1] : #favor ulceration 2/2 irritant dermatitis-- likely triggered by stoma prolapse from large hernia ddx also includes PG however absence of pain, no undermining/red border & slow progression make this less likely Worsening today due to improper wafer size Pending hernia repair March 20 - education, counseling - SWITCH clobetasol ointment-->spray BID to affected area only, no more than 2 weeks, avoid face and groin - r/b/a topical steroids were discussed including but not limited to thinning of the skin, atrophy and dyspigmentation. - photos taken again today - if worsening noted at NV in 3 weeks consider biopsy - surgical correction of hernia likely to improve peristomal wound

## 2024-04-11 ENCOUNTER — APPOINTMENT (OUTPATIENT)
Dept: DERMATOLOGY | Facility: CLINIC | Age: 64
End: 2024-04-11

## 2024-04-17 ENCOUNTER — INPATIENT (INPATIENT)
Facility: HOSPITAL | Age: 64
LOS: 8 days | Discharge: HOME | End: 2024-04-26
Attending: INTERNAL MEDICINE | Admitting: INTERNAL MEDICINE
Payer: MEDICARE

## 2024-04-17 VITALS
TEMPERATURE: 98 F | RESPIRATION RATE: 18 BRPM | DIASTOLIC BLOOD PRESSURE: 57 MMHG | OXYGEN SATURATION: 99 % | HEART RATE: 102 BPM | WEIGHT: 223.11 LBS | SYSTOLIC BLOOD PRESSURE: 126 MMHG

## 2024-04-17 DIAGNOSIS — K46.9 UNSPECIFIED ABDOMINAL HERNIA WITHOUT OBSTRUCTION OR GANGRENE: Chronic | ICD-10-CM

## 2024-04-17 DIAGNOSIS — D62 ACUTE POSTHEMORRHAGIC ANEMIA: ICD-10-CM

## 2024-04-17 DIAGNOSIS — R17 UNSPECIFIED JAUNDICE: ICD-10-CM

## 2024-04-17 DIAGNOSIS — Z29.9 ENCOUNTER FOR PROPHYLACTIC MEASURES, UNSPECIFIED: ICD-10-CM

## 2024-04-17 DIAGNOSIS — T14.8XXA OTHER INJURY OF UNSPECIFIED BODY REGION, INITIAL ENCOUNTER: ICD-10-CM

## 2024-04-17 DIAGNOSIS — E11.9 TYPE 2 DIABETES MELLITUS WITHOUT COMPLICATIONS: ICD-10-CM

## 2024-04-17 DIAGNOSIS — Z90.49 ACQUIRED ABSENCE OF OTHER SPECIFIED PARTS OF DIGESTIVE TRACT: Chronic | ICD-10-CM

## 2024-04-17 DIAGNOSIS — Z98.89 OTHER SPECIFIED POSTPROCEDURAL STATES: Chronic | ICD-10-CM

## 2024-04-17 DIAGNOSIS — Z93.3 COLOSTOMY STATUS: Chronic | ICD-10-CM

## 2024-04-17 LAB
ADD ON TEST-SPECIMEN IN LAB: SIGNIFICANT CHANGE UP
ALBUMIN SERPL ELPH-MCNC: 2.9 G/DL — LOW (ref 3.3–5)
ALP SERPL-CCNC: 681 U/L — HIGH (ref 40–120)
ALT FLD-CCNC: 81 U/L — HIGH (ref 4–41)
ANION GAP SERPL CALC-SCNC: 14 MMOL/L — SIGNIFICANT CHANGE UP (ref 7–14)
ANISOCYTOSIS BLD QL: SIGNIFICANT CHANGE UP
APTT BLD: 35.6 SEC — SIGNIFICANT CHANGE UP (ref 24.5–35.6)
AST SERPL-CCNC: 118 U/L — HIGH (ref 4–40)
BASE EXCESS BLDV CALC-SCNC: -2.6 MMOL/L — LOW (ref -2–3)
BASOPHILS # BLD AUTO: 0 K/UL — SIGNIFICANT CHANGE UP (ref 0–0.2)
BASOPHILS NFR BLD AUTO: 0 % — SIGNIFICANT CHANGE UP (ref 0–2)
BILIRUB DIRECT SERPL-MCNC: 6.9 MG/DL — HIGH (ref 0–0.3)
BILIRUB INDIRECT FLD-MCNC: 0.9 MG/DL — SIGNIFICANT CHANGE UP (ref 0–1)
BILIRUB SERPL-MCNC: 7.8 MG/DL — HIGH (ref 0.2–1.2)
BILIRUB SERPL-MCNC: 7.8 MG/DL — HIGH (ref 0.2–1.2)
BLD GP AB SCN SERPL QL: NEGATIVE — SIGNIFICANT CHANGE UP
BLOOD GAS VENOUS COMPREHENSIVE RESULT: SIGNIFICANT CHANGE UP
BUN SERPL-MCNC: 21 MG/DL — SIGNIFICANT CHANGE UP (ref 7–23)
CALCIUM SERPL-MCNC: 7.8 MG/DL — LOW (ref 8.4–10.5)
CHLORIDE BLDV-SCNC: 106 MMOL/L — SIGNIFICANT CHANGE UP (ref 96–108)
CHLORIDE SERPL-SCNC: 101 MMOL/L — SIGNIFICANT CHANGE UP (ref 98–107)
CO2 BLDV-SCNC: 23.7 MMOL/L — SIGNIFICANT CHANGE UP (ref 22–26)
CO2 SERPL-SCNC: 21 MMOL/L — LOW (ref 22–31)
CREAT SERPL-MCNC: 1.08 MG/DL — SIGNIFICANT CHANGE UP (ref 0.5–1.3)
EGFR: 77 ML/MIN/1.73M2 — SIGNIFICANT CHANGE UP
ELLIPTOCYTES BLD QL SMEAR: SLIGHT — SIGNIFICANT CHANGE UP
EOSINOPHIL # BLD AUTO: 0 K/UL — SIGNIFICANT CHANGE UP (ref 0–0.5)
EOSINOPHIL NFR BLD AUTO: 0 % — SIGNIFICANT CHANGE UP (ref 0–6)
GAS PNL BLDV: 135 MMOL/L — LOW (ref 136–145)
GIANT PLATELETS BLD QL SMEAR: PRESENT — SIGNIFICANT CHANGE UP
GLUCOSE BLDV-MCNC: 124 MG/DL — HIGH (ref 70–99)
GLUCOSE SERPL-MCNC: 155 MG/DL — HIGH (ref 70–99)
HCO3 BLDV-SCNC: 22 MMOL/L — SIGNIFICANT CHANGE UP (ref 22–29)
HCT VFR BLD CALC: 20.2 % — CRITICAL LOW (ref 39–50)
HCT VFR BLD CALC: 22.3 % — LOW (ref 39–50)
HCT VFR BLDA CALC: 20 % — CRITICAL LOW (ref 39–51)
HGB BLD CALC-MCNC: 6.6 G/DL — CRITICAL LOW (ref 12.6–17.4)
HGB BLD-MCNC: 6.5 G/DL — CRITICAL LOW (ref 13–17)
HGB BLD-MCNC: 7.2 G/DL — LOW (ref 13–17)
HYPOCHROMIA BLD QL: SLIGHT — SIGNIFICANT CHANGE UP
IANC: 7.24 K/UL — SIGNIFICANT CHANGE UP (ref 1.8–7.4)
INR BLD: 1.18 RATIO — SIGNIFICANT CHANGE UP (ref 0.85–1.18)
LACTATE BLDV-MCNC: 1.7 MMOL/L — SIGNIFICANT CHANGE UP (ref 0.5–2)
LYMPHOCYTES # BLD AUTO: 1.96 K/UL — SIGNIFICANT CHANGE UP (ref 1–3.3)
LYMPHOCYTES # BLD AUTO: 16.4 % — SIGNIFICANT CHANGE UP (ref 13–44)
MACROCYTES BLD QL: SIGNIFICANT CHANGE UP
MCHC RBC-ENTMCNC: 32.2 GM/DL — SIGNIFICANT CHANGE UP (ref 32–36)
MCHC RBC-ENTMCNC: 32.3 GM/DL — SIGNIFICANT CHANGE UP (ref 32–36)
MCHC RBC-ENTMCNC: 34.3 PG — HIGH (ref 27–34)
MCHC RBC-ENTMCNC: 35.1 PG — HIGH (ref 27–34)
MCV RBC AUTO: 106.2 FL — HIGH (ref 80–100)
MCV RBC AUTO: 109.2 FL — HIGH (ref 80–100)
METAMYELOCYTES # FLD: 5.2 % — HIGH (ref 0–1)
MONOCYTES # BLD AUTO: 0.72 K/UL — SIGNIFICANT CHANGE UP (ref 0–0.9)
MONOCYTES NFR BLD AUTO: 6 % — SIGNIFICANT CHANGE UP (ref 2–14)
MYELOCYTES NFR BLD: 2.6 % — HIGH (ref 0–0)
NEUTROPHILS # BLD AUTO: 8.14 K/UL — HIGH (ref 1.8–7.4)
NEUTROPHILS NFR BLD AUTO: 63.8 % — SIGNIFICANT CHANGE UP (ref 43–77)
NEUTS BAND # BLD: 4.3 % — SIGNIFICANT CHANGE UP (ref 0–6)
NRBC # BLD: 0 /100 WBCS — SIGNIFICANT CHANGE UP (ref 0–0)
NRBC # BLD: 1 /100 WBCS — HIGH (ref 0–0)
NRBC # FLD: 0 K/UL — SIGNIFICANT CHANGE UP (ref 0–0)
PCO2 BLDV: 39 MMHG — LOW (ref 42–55)
PH BLDV: 7.37 — SIGNIFICANT CHANGE UP (ref 7.32–7.43)
PLAT MORPH BLD: ABNORMAL
PLATELET # BLD AUTO: 54 K/UL — LOW (ref 150–400)
PLATELET # BLD AUTO: 59 K/UL — LOW (ref 150–400)
PLATELET COUNT - ESTIMATE: ABNORMAL
PO2 BLDV: 28 MMHG — SIGNIFICANT CHANGE UP (ref 25–45)
POIKILOCYTOSIS BLD QL AUTO: SLIGHT — SIGNIFICANT CHANGE UP
POLYCHROMASIA BLD QL SMEAR: SIGNIFICANT CHANGE UP
POTASSIUM BLDV-SCNC: 3.7 MMOL/L — SIGNIFICANT CHANGE UP (ref 3.5–5.1)
POTASSIUM SERPL-MCNC: 3.3 MMOL/L — LOW (ref 3.5–5.3)
POTASSIUM SERPL-SCNC: 3.3 MMOL/L — LOW (ref 3.5–5.3)
PROT SERPL-MCNC: 6.2 G/DL — SIGNIFICANT CHANGE UP (ref 6–8.3)
PROTHROM AB SERPL-ACNC: 13.2 SEC — HIGH (ref 9.5–13)
RBC # BLD: 1.85 M/UL — LOW (ref 4.2–5.8)
RBC # BLD: 2.1 M/UL — LOW (ref 4.2–5.8)
RBC # FLD: 27.4 % — HIGH (ref 10.3–14.5)
RBC # FLD: 28.2 % — HIGH (ref 10.3–14.5)
RBC BLD AUTO: SIGNIFICANT CHANGE UP
RH IG SCN BLD-IMP: POSITIVE — SIGNIFICANT CHANGE UP
SAO2 % BLDV: 37.9 % — LOW (ref 67–88)
SCHISTOCYTES BLD QL AUTO: SLIGHT — SIGNIFICANT CHANGE UP
SODIUM SERPL-SCNC: 136 MMOL/L — SIGNIFICANT CHANGE UP (ref 135–145)
SPHEROCYTES BLD QL SMEAR: SLIGHT — SIGNIFICANT CHANGE UP
TARGETS BLD QL SMEAR: SIGNIFICANT CHANGE UP
VARIANT LYMPHS # BLD: 1.7 % — SIGNIFICANT CHANGE UP (ref 0–6)
WBC # BLD: 11.96 K/UL — HIGH (ref 3.8–10.5)
WBC # BLD: 8.66 K/UL — SIGNIFICANT CHANGE UP (ref 3.8–10.5)
WBC # FLD AUTO: 11.96 K/UL — HIGH (ref 3.8–10.5)
WBC # FLD AUTO: 8.66 K/UL — SIGNIFICANT CHANGE UP (ref 3.8–10.5)

## 2024-04-17 PROCEDURE — 76700 US EXAM ABDOM COMPLETE: CPT | Mod: 26

## 2024-04-17 PROCEDURE — 99223 1ST HOSP IP/OBS HIGH 75: CPT | Mod: GC

## 2024-04-17 PROCEDURE — 99285 EMERGENCY DEPT VISIT HI MDM: CPT

## 2024-04-17 PROCEDURE — 99223 1ST HOSP IP/OBS HIGH 75: CPT

## 2024-04-17 PROCEDURE — 99222 1ST HOSP IP/OBS MODERATE 55: CPT | Mod: GC

## 2024-04-17 RX ORDER — PANTOPRAZOLE SODIUM 20 MG/1
40 TABLET, DELAYED RELEASE ORAL EVERY 12 HOURS
Refills: 0 | Status: DISCONTINUED | OUTPATIENT
Start: 2024-04-17 | End: 2024-04-18

## 2024-04-17 RX ORDER — LANOLIN ALCOHOL/MO/W.PET/CERES
3 CREAM (GRAM) TOPICAL AT BEDTIME
Refills: 0 | Status: DISCONTINUED | OUTPATIENT
Start: 2024-04-17 | End: 2024-04-26

## 2024-04-17 RX ORDER — INSULIN LISPRO 100/ML
VIAL (ML) SUBCUTANEOUS
Refills: 0 | Status: DISCONTINUED | OUTPATIENT
Start: 2024-04-17 | End: 2024-04-18

## 2024-04-17 RX ORDER — ONDANSETRON 8 MG/1
4 TABLET, FILM COATED ORAL EVERY 8 HOURS
Refills: 0 | Status: DISCONTINUED | OUTPATIENT
Start: 2024-04-17 | End: 2024-04-26

## 2024-04-17 RX ORDER — DEXTROSE 50 % IN WATER 50 %
15 SYRINGE (ML) INTRAVENOUS ONCE
Refills: 0 | Status: DISCONTINUED | OUTPATIENT
Start: 2024-04-17 | End: 2024-04-26

## 2024-04-17 RX ORDER — INFLUENZA VIRUS VACCINE 15; 15; 15; 15 UG/.5ML; UG/.5ML; UG/.5ML; UG/.5ML
0.7 SUSPENSION INTRAMUSCULAR ONCE
Refills: 0 | Status: DISCONTINUED | OUTPATIENT
Start: 2024-04-17 | End: 2024-04-26

## 2024-04-17 RX ORDER — LOSARTAN POTASSIUM 100 MG/1
100 TABLET, FILM COATED ORAL DAILY
Refills: 0 | Status: DISCONTINUED | OUTPATIENT
Start: 2024-04-18 | End: 2024-04-26

## 2024-04-17 RX ORDER — ACETAMINOPHEN 500 MG
650 TABLET ORAL EVERY 6 HOURS
Refills: 0 | Status: DISCONTINUED | OUTPATIENT
Start: 2024-04-17 | End: 2024-04-26

## 2024-04-17 RX ORDER — INSULIN LISPRO 100/ML
VIAL (ML) SUBCUTANEOUS AT BEDTIME
Refills: 0 | Status: DISCONTINUED | OUTPATIENT
Start: 2024-04-17 | End: 2024-04-18

## 2024-04-17 RX ORDER — LOSARTAN POTASSIUM 100 MG/1
100 TABLET, FILM COATED ORAL DAILY
Refills: 0 | Status: DISCONTINUED | OUTPATIENT
Start: 2024-04-17 | End: 2024-04-17

## 2024-04-17 RX ORDER — DEXTROSE 50 % IN WATER 50 %
25 SYRINGE (ML) INTRAVENOUS ONCE
Refills: 0 | Status: DISCONTINUED | OUTPATIENT
Start: 2024-04-17 | End: 2024-04-26

## 2024-04-17 RX ORDER — ATORVASTATIN CALCIUM 80 MG/1
20 TABLET, FILM COATED ORAL AT BEDTIME
Refills: 0 | Status: DISCONTINUED | OUTPATIENT
Start: 2024-04-17 | End: 2024-04-26

## 2024-04-17 RX ORDER — ENOXAPARIN SODIUM 100 MG/ML
40 INJECTION SUBCUTANEOUS EVERY 24 HOURS
Refills: 0 | Status: DISCONTINUED | OUTPATIENT
Start: 2024-04-17 | End: 2024-04-17

## 2024-04-17 RX ORDER — POTASSIUM CHLORIDE 20 MEQ
40 PACKET (EA) ORAL ONCE
Refills: 0 | Status: COMPLETED | OUTPATIENT
Start: 2024-04-17 | End: 2024-04-17

## 2024-04-17 RX ORDER — ALLOPURINOL 300 MG
100 TABLET ORAL DAILY
Refills: 0 | Status: DISCONTINUED | OUTPATIENT
Start: 2024-04-17 | End: 2024-04-26

## 2024-04-17 RX ORDER — SODIUM CHLORIDE 9 MG/ML
1000 INJECTION INTRAMUSCULAR; INTRAVENOUS; SUBCUTANEOUS ONCE
Refills: 0 | Status: COMPLETED | OUTPATIENT
Start: 2024-04-17 | End: 2024-04-17

## 2024-04-17 RX ORDER — GLUCAGON INJECTION, SOLUTION 0.5 MG/.1ML
1 INJECTION, SOLUTION SUBCUTANEOUS ONCE
Refills: 0 | Status: DISCONTINUED | OUTPATIENT
Start: 2024-04-17 | End: 2024-04-26

## 2024-04-17 RX ORDER — DEXTROSE 50 % IN WATER 50 %
12.5 SYRINGE (ML) INTRAVENOUS ONCE
Refills: 0 | Status: DISCONTINUED | OUTPATIENT
Start: 2024-04-17 | End: 2024-04-26

## 2024-04-17 RX ADMIN — Medication 40 MILLIEQUIVALENT(S): at 11:45

## 2024-04-17 RX ADMIN — SODIUM CHLORIDE 1000 MILLILITER(S): 9 INJECTION INTRAMUSCULAR; INTRAVENOUS; SUBCUTANEOUS at 11:45

## 2024-04-17 RX ADMIN — ATORVASTATIN CALCIUM 20 MILLIGRAM(S): 80 TABLET, FILM COATED ORAL at 22:08

## 2024-04-17 RX ADMIN — Medication 1: at 19:09

## 2024-04-17 NOTE — CHART NOTE - NSCHARTNOTEFT_GEN_A_CORE
This is copied from a CT Abd Pelvis with IV contrast performed on 1/24/2024. This report is not available within [facility] on PACS.    EXAM: [id number] - CT ABDOMEN AND PELVIS IC  - ORDERED BY: JAKE POLANCO      PROCEDURE DATE:  01/24/2024        INTERPRETATION:  CLINICAL INFORMATION: Colostomy prolapse, parastomal hernia. History of colovesical fistula with severe pelvic fibrosis/scarring. Evaluate rectal stump.    COMPARISON: CT 5/1/2017.    CONTRAST/COMPLICATIONS:  IV Contrast: Omnipaque 350  90 cc administered   10 cc discarded  Oral Contrast: NONE  Complications: None reported at time of study completion    PROCEDURE:  CT of the Abdomen and Pelvis was performed.  Precontrast images were obtained through the pelvis. Rectal contrast was administered as requested.  Sagittal and coronal reformats were performed.    FINDINGS:  LOWER CHEST: Coronary artery calcifications.    LIVER: Mild diffuse periportal edema/thickening of unknown etiology, new since 2017.  BILE DUCTS: Normal caliber.  GALLBLADDER: Within normal limits.  SPLEEN: Splenomegaly. The spleen measures 16.2 cm in length, also new.  PANCREAS: Within normal limits.  ADRENALS: Within normal limits.  KIDNEYS/URETERS: Bilateral diffuse infiltrative soft tissue throughout the collecting systems and around the kidneys without hydronephrosis or renal cortical mass.    BLADDER: Small amount of rectal contrast within the posterior bladder wall, compatible with history of known colovesical fistula. Previously noted bladder wall thickening has resolved.  REPRODUCTIVE ORGANS: Fiducials noted within the prostate.    BOWEL: Sigmoid resection with Hewitt's pouch and left lower quadrant colostomy. Left lower quadrant parastomal hernia contains nonobstructed small bowel. Rectal contrast communicates with a small ill-defined extraluminal presacral mass/collapsed collection above the Hewitt's pouch measuring approximately 2.7 x 2.2 cm (303, 97). This abuts pelvic small bowel without extravasated rectal contrast within small bowel to indicate fistula. Right lower quadrant small bowel anastomosis. No bowel obstruction. Appendix is normal.  PERITONEUM: No ascites.  VESSELS: Atherosclerotic changes.  RETROPERITONEUM/LYMPH NODES: No lymphadenopathy. Scattered subcentimeter retroperitoneal and pelvic nodes are unchanged.  ABDOMINAL WALL: Within normal limits.  BONES: Degenerative changes. Marrow replacement suspected in both proximal femoral shafts. Old right posterior 11th rib fracture with callus.    IMPRESSION:  Hewitt's pouch with left lower quadrant colostomy with parastomal hernia.    Small residual colovesical fistula involving the posterior bladder wall. Rectal contrast also communicates with a small ill-defined extraluminal presacral mass/collapsed collection above the Hewitt's pouch.    Extensive new infiltrative soft tissue within the liver along the portal triads, and within both kidneys, with splenomegaly and abnormal femoral marrow. Although indeterminate, differential possibilities favor lymphoma, with less common entities including IgG4, etc.    --- End of Report ---

## 2024-04-17 NOTE — H&P ADULT - HISTORY OF PRESENT ILLNESS
63M, hx T2DM, gout, prostate ca in remission (chemo + rads 2016), sigmoid diverticulitis/colovesical fistula in 2016 s/p Shelley's, with 2 aborted reversal attempts, left with chronic colostomy. Presents from outpatient surgeon's office with painless jaundice. Has been seeing Dr. Ramos for possible repair of parastomal hernia with surrounding skin excoriation. Has completed course of antibiotics as outpatient related to superficial infection at site of stoma. Patient states yellow skin has gradually developed over weeks, he did not notice when it first started.    Notably, patient has had multiple episodes of significant bleeding from the excoriated skin around the stoma within the past month, which filled up his colostomy bag multiple times. Patient denies any bleeding from the colostomy tract itself, and denies dark or bloody stools.    Additionally, patient had CT abdomen pelvis performed in Jan 2024 during workup for hernia repair, which noted diffuse infiltrative soft tissue within the liver and portal triads, and in the bilateral kidneys and collection systems.

## 2024-04-17 NOTE — CONSULT NOTE ADULT - SUBJECTIVE AND OBJECTIVE BOX
Patient is a 63y old  Male who presents with a chief complaint of     HPI:      PAST MEDICAL & SURGICAL HISTORY:      REVIEW OF SYSTEMS    General:	Negative  Skin/Breast: Negative	  Ophthalmologic: Negative  ENMT: Negative  Respiratory and Thorax: Negative  Cardiovascular: Negative  Gastrointestinal: Negative  Genitourinary: Negative  Musculoskeletal: Negative  Neurological: Negative  Psychiatric: Negative  Hematology/Lymphatics: Negative  Endocrine:	Negative  Allergic/Immunologic:	 Negative      MEDICATIONS  (STANDING):    MEDICATIONS  (PRN):      Allergies    No Known Allergies    Intolerances        SOCIAL HISTORY:  Alcohol: Denied  Smoking: Nonsmoker  Drug Use: Denied  Marital Status:         FAMILY HISTORY:      Vital Signs Last 24 Hrs  T(C): 36.5 (17 Apr 2024 08:10), Max: 36.5 (17 Apr 2024 08:10)  T(F): 97.7 (17 Apr 2024 08:10), Max: 97.7 (17 Apr 2024 08:10)  HR: 95 (17 Apr 2024 10:18) (95 - 102)  BP: 131/77 (17 Apr 2024 10:18) (126/57 - 131/77)  BP(mean): --  RR: 20 (17 Apr 2024 11:04) (18 - 20)  SpO2: 100% (17 Apr 2024 11:04) (99% - 100%)    Parameters below as of 17 Apr 2024 11:04  Patient On (Oxygen Delivery Method): room air        PHYSICAL EXAM:  Constitutional: well developed, well nourished, NAD  Eyes: anicteric  ENMT: normal facies, symmetric  Neck: supple  Respiratory: CTA bilat  Cardiovascular: RRR  Gastrointestinal: abdomen soft, nontender, nondistended. No obvious masses. No peritonitis  Extremities: FROM, warm  Neurological: intact, non-focal  Skin: no gross lesions  Lymph Nodes: no gross adenopathy  Musculoskeletal: equal strength bilateral upper and lower extremities  Psychiatric: oriented x 3; appropriate  Rectal:        LABS:                        6.5    11.96 )-----------( 59       ( 17 Apr 2024 09:25 )             20.2     04-17    136  |  101  |  21  ----------------------------<  155<H>  3.3<L>   |  21<L>  |  1.08    Ca    7.8<L>      17 Apr 2024 09:25    TPro  6.2  /  Alb  2.9<L>  /  TBili  7.8<H>  /  DBili  6.9<H>  /  AST  118<H>  /  ALT  81<H>  /  AlkPhos  681<H>  04-17    PT/INR - ( 17 Apr 2024 09:25 )   PT: 13.2 sec;   INR: 1.18 ratio         PTT - ( 17 Apr 2024 09:25 )  PTT:35.6 sec  Urinalysis Basic - ( 17 Apr 2024 09:25 )    Color: x / Appearance: x / SG: x / pH: x  Gluc: 155 mg/dL / Ketone: x  / Bili: x / Urobili: x   Blood: x / Protein: x / Nitrite: x   Leuk Esterase: x / RBC: x / WBC x   Sq Epi: x / Non Sq Epi: x / Bacteria: x        RADIOLOGY & ADDITIONAL STUDIES: Patient is a 63y old  Male who presents with a chief complaint of     HPI:    Mr. Rawls is a 64 y/o male with a history of prostate cancer Treated with chemo/radiation 8 years ago and sigmoid diverticulitis/colovesical fistula who is s/p Hewitt's procedure on  08/2016,  s/p 2 aborted reversal attempts in 11/2016 by Dr. Parkinson and again 06/29/2017 at [facility] by Dr. Oconnell due to severe pelvic fibrosis/scar tissue, pt had a colonoscopy 2018 without significant findings. Patient saw Dr Tan Vargas in the office on 02/28 for possible repair of parastomal hernia and surrounding skin excoriation. Patient was referred to Dr Ramos for the parastomal hernia repair and a PET scan was suggested/ordered in the view of CT findings of  s new periportal edema/thickening, splenomegaly and bilateral diffuse infiltrative soft tissue throughout collecting system and kidneys, findings concerning for possible lymphoma. During pre surgical w/u patient was found icteric and with elevated Bilirubin and was advised to come to the ED.     IN ED:  His colostomy is functioning despite significant prolapse. He has improved in skin excoriation/ulceration around stoma. States has had 3 weeks of jaundice with associated, unintentional 5 pound weight loss despite maintenance in regular diet, denies any abdominal pain. WBC wnl, TB 7.8, transaminitis.  US with 7 mm CBD, mild intra/extrahepatic biliary ductal dilatation with debris in CBD concerning for choledolithiasis.      PAST MEDICAL & SURGICAL HISTORY:  FAMILY HISTORY:      Vital Signs Last 24 Hrs  T(C): 36.5 (17 Apr 2024 08:10), Max: 36.5 (17 Apr 2024 08:10)  T(F): 97.7 (17 Apr 2024 08:10), Max: 97.7 (17 Apr 2024 08:10)  HR: 95 (17 Apr 2024 10:18) (95 - 102)  BP: 131/77 (17 Apr 2024 10:18) (126/57 - 131/77)  BP(mean): --  RR: 20 (17 Apr 2024 11:04) (18 - 20)  SpO2: 100% (17 Apr 2024 11:04) (99% - 100%)    Parameters below as of 17 Apr 2024 11:04  Patient On (Oxygen Delivery Method): room air        PHYSICAL EXAM:  Constitutional: well developed, well nourished, NAD  Eyes: icteric  Respiratory: Unlabored breathing   Cardiovascular: RRR  Gastrointestinal: abdomen soft, nontender, nondistended. No obvious masses. No peritonitis  Extremities: FROM, warm      LABS:                        6.5    11.96 )-----------( 59       ( 17 Apr 2024 09:25 )             20.2     04-17    136  |  101  |  21  ----------------------------<  155<H>  3.3<L>   |  21<L>  |  1.08    Ca    7.8<L>      17 Apr 2024 09:25    TPro  6.2  /  Alb  2.9<L>  /  TBili  7.8<H>  /  DBili  6.9<H>  /  AST  118<H>  /  ALT  81<H>  /  AlkPhos  681<H>  04-17    PT/INR - ( 17 Apr 2024 09:25 )   PT: 13.2 sec;   INR: 1.18 ratio         PTT - ( 17 Apr 2024 09:25 )  PTT:35.6 sec  Urinalysis Basic - ( 17 Apr 2024 09:25 )    Color: x / Appearance: x / SG: x / pH: x  Gluc: 155 mg/dL / Ketone: x  / Bili: x / Urobili: x   Blood: x / Protein: x / Nitrite: x   Leuk Esterase: x / RBC: x / WBC x   Sq Epi: x / Non Sq Epi: x / Bacteria: x        RADIOLOGY & ADDITIONAL STUDIES:

## 2024-04-17 NOTE — ED ADULT TRIAGE NOTE - CHIEF COMPLAINT QUOTE
Sent to ED for Dr Guerrero for evaluation. Pt skin and sclera jaundiced. as per pt, jaundice started ~3 weeks ago. Hx prostate CA, DM, HTN

## 2024-04-17 NOTE — H&P ADULT - NSICDXPASTMEDICALHX_GEN_ALL_CORE_FT
PAST MEDICAL HISTORY:  Diverticulitis     Gout     Parastomal hernia     Prostate cancer     Type 2 diabetes mellitus

## 2024-04-17 NOTE — H&P ADULT - PROBLEM SELECTOR PLAN 2
likely related to bleeding from excoriated skin at stoma  possibly multifactorial with folate/b12 deficiency given macrocytosis, vs reticulocytosis from blood loss    - iron studies, B12, folate, retic count pending  - no longer bleeding from stoma site  - PRBC transfusion on admission for Hgb 6.5, ctm CBCs & stoma  - maintain active T&S

## 2024-04-17 NOTE — ED PROVIDER NOTE - IV ALTEPLASE INCLUSION HIDDEN
Access: Stairs to enter with rails(7 CELESTE with left ascending rail)  Bathroom Shower/Tub: Tub/Shower unit  Bathroom Toilet: Standard  Home Equipment: (None)  ADL Assistance: Independent  Homemaking Assistance: Independent(Pt reports struggling recently due to anemia)  Ambulation Assistance: Independent  Transfer Assistance: Independent  Active : Yes  Occupation: Retired(construction)  [address]: working around [employer], building a [employer]  Additional Comments: Pt has sister, brother and parents who can assist upon d/c.  Pt denies falls. Objective   Vision: Within Functional Limits(glasses at all times)  Hearing: Within functional limits    Orientation  Overall Orientation Status: Within Normal Limits  Functional Mobility  Functional - Mobility Device: No device  Activity: To/from bathroom(large loop within hallway)  Assist Level: Stand by assistance  Functional Mobility Comments: assist for wound vac/line management, slight increase lateral sway but no LOB. See PT note for more details. Toilet Transfers  Toilet - Technique: Ambulating  Toilet Transfer: Stand by assistance  Toilet Transfers Comments: with use of grab bar, increased guarding when lowering but no assistance required  ADL  LE Dressing: Setup(use of figure 4 and slight forward flexion to don/doff sock, increased time and effort.  Would benefit from assessment of pants)  Transfers  Sit to stand: Stand by assistance  Stand to sit: Stand by assistance  Cognition  Overall Cognitive Status: WNL     Plan   Plan  Times per week: 2-5x  Times per day: Daily  Current Treatment Recommendations: Patient/Caregiver Education & Training, Safety Education & Training, Equipment Evaluation, Education, & procurement, Self-Care / ADL, Home Management Training, Functional Mobility Training, Strengthening, Endurance Training    AM-PAC Score        AM-PAC Inpatient Daily Activity Raw Score: 21 (01/28/20 1105)  AM-PAC Inpatient ADL T-Scale Score : 44.27 show

## 2024-04-17 NOTE — H&P ADULT - NSHPREVIEWOFSYSTEMS_GEN_ALL_CORE
REVIEW OF SYSTEMS:    CONSTITUTIONAL: Occasional lightheadedness after significant blood loss from skin surrounding stoma, denies at the moment  EYES/ENT: No visual changes;  No vertigo or throat pain   NECK: No pain or stiffness  RESPIRATORY: No cough, wheezing, hemoptysis; No shortness of breath  CARDIOVASCULAR: No chest pain or palpitations  GASTROINTESTINAL: No abdominal or epigastric pain. No nausea, vomiting, or hematemesis; No diarrhea or constipation. No melena or hematochezia.  GENITOURINARY: No dysuria, frequency or hematuria  NEUROLOGICAL: No numbness or weakness  SKIN: No itching, rashes

## 2024-04-17 NOTE — H&P ADULT - ATTENDING COMMENTS
63M, hx T2DM, gout, prostate ca treated with chemo/rads,  Shelley's with chronic colostomy due to aborted reversal, admitted with painless jaundice and anemia, Outpatient imaging notable for infiltrative soft tissue process     Jaundice: GI/Sx input appreciated, repeat CT pending, tentative plan for ERCP tomorrow    Anemia: reports of blood loss around stoma site with "filling the bag" with blood from patient.  Will transfuse and check post transfusion CBC.  Thrombocytopenia, unclear etiology.  Monitor.

## 2024-04-17 NOTE — PHYSICAL THERAPY INITIAL EVALUATION ADULT - PERTINENT HX OF CURRENT PROBLEM, REHAB EVAL
63 year old Male presents to the ED with yellowing of skin. Patient notes over the past 3 weeks painless yellowing of his skin.

## 2024-04-17 NOTE — H&P ADULT - PROBLEM SELECTOR PLAN 1
ddx includes choledocholithiasis, sludge, pancreatic mass, infiltrative process noted on CT as outpatient  sono without cholelithiasis, and bile duct dilatation    - CT chest abd pelvis w/ IV contrast  - GI consulted, considering ERCP  - per CT report, findings concerning for lymphoma vs IgG4 disease, may require liver bx as inpatient, per GI  - SPEP, serum IgG4 pending

## 2024-04-17 NOTE — ED PROVIDER NOTE - PHYSICAL EXAMINATION
CONSTITUTIONAL: Well appearing, awake, alert  ENMT: Airway patent, tolerating secretions.    NECK: Supple. No JVD  EYES: Clear bilaterally, pupils equal, round  CARDIAC: Warm, well-perfused  RESPIRATORY: Equal Chest Rise, no stridor. No respiratory distress.   GASTROINTESTINAL: Non-distended. non-tender. RLQ colostomy in place with pink stomal mucosa - yellow stool output  MUSCULOSKELETAL: No gross joint deformity   NEUROLOGICAL: Alert. Speech Fluent. Attends to conversation and exam  SKIN:  No erythema. jaundiced   PSYCHIATRIC: Normal affect. Cooperative with history and exam

## 2024-04-17 NOTE — H&P ADULT - NSHPLABSRESULTS_GEN_ALL_CORE
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    Small residual colovesical fistula involving the posterior bladder wall. Rectal contrast also communicates with a small ill-defined extraluminal presacral mass/collapsed collection above the Hewitt's pouch.    Extensive new infiltrative soft tissue within the liver along the portal triads, and within both kidneys, with splenomegaly and abnormal femoral marrow. Although indeterminate, differential possibilities favor lymphoma, with less common entities including IgG4, etc. 6.5    11.96 )-----------( 59       ( 17 Apr 2024 09:25 )             20.2     04-17    136  |  101  |  21  ----------------------------<  155<H>  3.3<L>   |  21<L>  |  1.08    Ca    7.8<L>      17 Apr 2024 09:25    TPro  6.2  /  Alb  2.9<L>  /  TBili  7.8<H>  /  DBili  6.9<H>  /  AST  118<H>  /  ALT  81<H>  /  AlkPhos  681<H>  04-17    11:49 - VBG - pH: 7.37  | pCO2: 39    | pO2: 28    | Lactate: 1.7    < from: US Abdomen Complete (04.17.24 @ 10:31) >    IMPRESSION:  Mild intrahepatic and extrahepatic biliary ductal dilatation with debris   in the CBD.    Cholelithiasis and gallbladder wall thickening. Negative sonographic   Mccormack sign.    Hepatosplenomegaly.    Mild bilateral hydronephrosis which mildly improved on the right status   post postvoid.    Residual postvoid volume 86 cc.    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    Small residual colovesical fistula involving the posterior bladder wall. Rectal contrast also communicates with a small ill-defined extraluminal presacral mass/collapsed collection above the Hewitt's pouch.    Extensive new infiltrative soft tissue within the liver along the portal triads, and within both kidneys, with splenomegaly and abnormal femoral marrow. Although indeterminate, differential possibilities favor lymphoma, with less common entities including IgG4, etc.

## 2024-04-17 NOTE — ED PROVIDER NOTE - ATTENDING CONTRIBUTION TO CARE
62 yo M hx prostate cancer, in remission, hx colon resection s/p ostomy, presenting for evaluation of 3 weeks of jaundice. Denies fevers, chills, nausea/vomiting, diarrhea/constipation, no changes in urination.     VITALS: reviewed  GEN: NAD, A & O x 4  HEAD/EYES: NCAT, EOMI, icteric sclerae,   ENT: mucus membranes moist, oropharynx WNL, trachea midline,  RESP: lungs CTA with equal breath sounds bilaterally, chest wall nontender and atraumatic  CV: heart with reg rhythm S1, S2, distal pulses intact and symmetric bilaterally  ABDOMEN: normoactive bowel sounds, soft, nondistended, nontender, no palpable masses  : no CVAT  MSK: extremities atraumatic and nontender, no edema, no asymmetry.  SKIN: jaundiced, warm, dry, no rash, no bruising, no cyanosis. color appropriate for ethnicity  NEURO: alert, mentating appropriately, no facial asymmetry.   PSYCH: Affect appropriate      Plan for labs, including total/direct bili, US and reassess. TBA.    alk phos and AST/ALT elevated with elevated bilirubin. US shows intrahepatic and extrahepatic biliary dilation. Gallbladder wall thickening. CBD 7 mm with intraluminal debris. GI and surgery consulted.

## 2024-04-17 NOTE — CONSULT NOTE ADULT - ASSESSMENT
# Choledocholithiasis w/o abdominal pain  3 weeks of jaundice with associated, unintentional 5 pound weight loss despite maintenance in regular diet. US with 7 mm CBD, mild intra/extrahepatic biliary ductal dilatation with debris in CBD concerning for choledolithiasis.    # Cholelithiasis with GB wall thickening, negative Mccormack sign    # h/o prostate cancer s/p radiation and colon resection with ostomy, luis angel  # Peripheral bleeding at ostomy c/b hernia - with plans for surgical repair this week; 2 days of increased bleeding at peripheral site per patient    Recommendations  - Tentatively plan for ERCP Thursday if medically optimized  - Diet as tolerated today  - NPO after midnight  - s/p 1 pRBC, recheck Hgb  - Check CBC, CMP, coags; correct electrolyte distrubances via IV  - Transfuse if hgb < 7  - Maintain working IV access  - Ensure adequate hydration  - Surgery following, appreciate recs  - Rest of care per ED    Preliminary note until signed by Attending.    Thank you for involving us in this patient's care. Please reach out if any further questions or concerns.    Kaelyn Rangel MD  Gastroenterology/Hepatology Fellow, PGY-7    NON-URGENT CONSULTS:  Please email [email or web address]  AT NIGHT AND ON WEEKENDS:  Contact on-call GI fellow via answering service ([phone]) from 5pm-8am and on weekends/holidays  MONDAY-FRIDAY 8AM-5PM:  Pager: [phone] ([facility])  Pager: [phone] ([facility])   # Choledocholithiasis w/o abdominal pain  3 weeks of jaundice with associated, unintentional 5 pound weight loss despite maintenance in regular diet. US with 7 mm CBD, mild intra/extrahepatic biliary ductal dilatation with debris in CBD concerning for choledocholithiasis. However, given history and initial painless jaundice, may benefit from further imaging to also r/o potential mass.    # Cholelithiasis with GB wall thickening, negative Mccormack sign  # Anemia, likely due to peristomal bleeding c/b peristomal hernia, pending surgical repair  # ?c/f lymphoma  # h/o prostate cancer s/p chemo/radiation and colon resection with ostomy, luis angel  # Peripheral bleeding at ostomy in setting of peristomal hernia - with plans for surgical repair this week; 2 days of increased bleeding at peripheral site per patient  # Prior sigmoid diverticulitis/colovesicular fistula s/p [place]'s 8/2016 with 2 aborted reversal attempts (11/2016, 6/2017) due to severe pelvic fibrosis/scar tissue, s/p colonoscopy 2018 without significant findings,    Recommendations  - Please repeat CTAP  - Tentatively plan for EGD/ERCP +/- EUS Thursday if medically optimized  - Diet as tolerated today  - NPO after midnight  - s/p 1 pRBC, recheck Hgb  - Check CBC, CMP, coags; correct electrolyte disturbance via IV  - Transfuse if hgb < 7  - Maintain working IV access  - Ensure adequate hydration  - Surgery following, appreciate recs  - Rest of care per ED    Preliminary note until signed by Attending.    Thank you for involving us in this patient's care. Please reach out if any further questions or concerns.    Kaelyn Rangel MD  Gastroenterology/Hepatology Fellow, PGY-7    NON-URGENT CONSULTS:  Please email [email or web address]  AT NIGHT AND ON WEEKENDS:  Contact on-call GI fellow via answering service ([phone]) from 5pm-8am and on weekends/holidays  MONDAY-FRIDAY 8AM-5PM:  Pager: [phone] ([facility])  Pager: [phone] ([facility])   # Obstructive jaundice w/o abdominal pain  3 weeks of jaundice with associated, unintentional 5 pound weight loss despite maintenance in regular diet. US with 7 mm CBD, mild intra/extrahepatic biliary ductal dilatation with debris in CBD concerning for choledocholithiasis, cannot r/o malignancy with infiltrative disease. However, given history and initial painless jaundice, may benefit from further imaging to also r/o potential mass.    # Cholelithiasis with GB wall thickening, negative Mccormack sign  # Anemia, likely due to peristomal bleeding c/b peristomal hernia, pending surgical repair  # ?c/f lymphoma  # h/o prostate cancer s/p chemo/radiation and colon resection with ostomy, luis angel  # Peripheral bleeding at ostomy in setting of peristomal hernia - with plans for surgical repair this week; 2 days of increased bleeding at peripheral site per patient  # Prior sigmoid diverticulitis/colovesicular fistula s/p [place]'s 8/2016 with 2 aborted reversal attempts (11/2016, 6/2017) due to severe pelvic fibrosis/scar tissue, s/p colonoscopy 2018 without significant findings,    Recommendations  - Please repeat CTAP IV contrast only  - Tentatively plan for EGD/ERCP +/- EUS Thursday if medically optimized  - Diet as tolerated today  - NPO after midnight  - s/p 1 pRBC, recheck Hgb  - Check CBC, CMP, coags; correct electrolyte disturbance via IV  - Transfuse if hgb < 7  - Maintain working IV access  - Ensure adequate hydration  - Surgery following, appreciate recs  - Rest of care per ED    Preliminary note until signed by Attending.    Thank you for involving us in this patient's care. Please reach out if any further questions or concerns.    Kaelyn Rangel MD  Gastroenterology/Hepatology Fellow, PGY-7    NON-URGENT CONSULTS:  Please email [email or web address]  AT NIGHT AND ON WEEKENDS:  Contact on-call GI fellow via answering service ([phone]) from 5pm-8am and on weekends/holidays  MONDAY-FRIDAY 8AM-5PM:  Pager: [phone] ([facility])  Pager: [phone] ([facility])

## 2024-04-17 NOTE — H&P ADULT - TIME BILLING
Review of prior records, labs, vitals, imaging.  Discussion with care team and consultants.  Examination and discussion with patient.

## 2024-04-17 NOTE — H&P ADULT - NSHPPHYSICALEXAM_GEN_ALL_CORE
Vital Signs Last 24 Hrs  T(C): 36.4 (17 Apr 2024 14:20), Max: 37.1 (17 Apr 2024 14:00)  T(F): 97.6 (17 Apr 2024 14:20), Max: 98.7 (17 Apr 2024 14:00)  HR: 88 (17 Apr 2024 14:20) (88 - 102)  BP: 124/69 (17 Apr 2024 14:20) (113/66 - 131/77)  BP(mean): --  RR: 17 (17 Apr 2024 14:20) (17 - 20)  SpO2: 100% (17 Apr 2024 14:20) (99% - 100%)    Parameters below as of 17 Apr 2024 14:20  Patient On (Oxygen Delivery Method): room air      Physical Exam:   General: NAD  HEENT: PERRL, EOMI, normal sclera and conjunctiva, no oral lesions  Neck: Supple, no JVD  Lungs: CTA bilaterally  Heart: RRR, normal S1S2, no murmurs/rubs/gallops  Abdomen: Soft, ND/NT  Extremities: 2+ peripheral pulses, no cyanosis/clubbing/edema, full ROM  Skin: Warm, well-perfused  Neuro: A&O x3, no focal deficits

## 2024-04-17 NOTE — H&P ADULT - ASSESSMENT
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 Outpatient imaging notable for infiltrative soft tissue process involving the kidneys and liver/portal triad.

## 2024-04-17 NOTE — H&P ADULT - PROBLEM SELECTOR PLAN 5
DVT: lovenox  Diet: reg, NPO at [place] 4/17 for ERCP  Dispo: likely home, active DVT: SCDs  Diet: reg, NPO at [place] 4/17 for ERCP  Dispo: likely home, active

## 2024-04-17 NOTE — ED PROVIDER NOTE - CLINICAL SUMMARY MEDICAL DECISION MAKING FREE TEXT BOX
63M with painless jaundice - will eval for obstructive pathology resulting in elevated bilirubin - anticipate admission

## 2024-04-17 NOTE — ED ADULT NURSE REASSESSMENT NOTE - NS ED NURSE REASSESS COMMENT FT1
Patient resting in stretcher, at baseline mental status, no acute distress noted at this time. Respirations equal and unlabored. Pt tolerated blood transfusion well. Care plan continued. Comfort measures provided. Safety maintained. Repeat lab sent. Pending lab results.

## 2024-04-17 NOTE — PHYSICAL THERAPY INITIAL EVALUATION ADULT - ADDITIONAL COMMENTS
Patient lives in [facility] to [facility]. Patient was independent in all ADL prior to admission. Patient was not using an assistive device prior to admission.      patient left semi-supine in stretcher, all lines intact, NAD.

## 2024-04-17 NOTE — PATIENT PROFILE ADULT - FALL HARM RISK - UNIVERSAL INTERVENTIONS
Bed in lowest position, wheels locked, appropriate side rails in place/Call bell, personal items and telephone in reach/Instruct patient to call for assistance before getting out of bed or chair/Non-slip footwear when patient is out of bed/[place] to call system/Physically safe environment - no spills, clutter or unnecessary equipment/Purposeful Proactive Rounding/Room/bathroom lighting operational, light cord in reach

## 2024-04-17 NOTE — ED PROVIDER NOTE - OBJECTIVE STATEMENT
63M hx of prostate ca in remission, colon  resection and ostomy approx 8 y ago presents to the ED with yellowing of skin. Patient notes over the past 3 weeks painless yellowing of his skin. No n/v. No abdominal pain. No change in ostomy output.

## 2024-04-17 NOTE — CONSULT NOTE ADULT - ASSESSMENT
PLAN:   64 y/o male with a history of prostate cancer s/p  chemo/radiation 8 years ago and sigmoid diverticulitis/colovesical fistula who is s/p Hewitt's procedure on  08/2016,  s/p 2 aborted reversal attempts in 11/2016 by Dr. Parkinson and again 06/29/2017 at [facility] by Dr. Oconnell due to severe pelvic fibrosis/scar tissue,Pt saw Dr Tan Vargas on 02/28 for possible repair of parastomal hernia. outpatient CT findings of  new periportal edema/thickening, splenomegaly and bilateral diffuse infiltrative soft tissue throughout collecting system and kidneys, findings concerning for possible lymphoma. During pre surgical eval, found to have painless jaundice. WBC wnl, TB 7.8, transaminitis.  US with 7 mm CBD, mild intra/extrahepatic biliary ductal dilatation with debris in CBD.     PLAN:  - No acute surgical intervention  - Patient at the moment stable, low concern for cholangitis.   - Recommend GI consult for possible ERCP  - Recommend medical w/u of anemia and prior CT scan findings.   - Recommend transfusing >7        Peg Santana PGY2  E Team surgery  [id number]   64 y/o male with a history of prostate cancer s/p  chemo/radiation 8 years ago and sigmoid diverticulitis/colovesical fistula who is s/p Hewitt's procedure on  08/2016,  s/p 2 aborted reversal attempts in 11/2016 by Dr. Parkinson and again 06/29/2017 at [facility] by Dr. Oconnell due to severe pelvic fibrosis/scar tissue,Pt saw Dr Tan Vargas on 02/28 for possible repair of parastomal hernia. outpatient CT findings of  new periportal edema/thickening, splenomegaly and bilateral diffuse infiltrative soft tissue throughout collecting system and kidneys, findings concerning for possible lymphoma. During pre surgical eval, found to have painless jaundice. WBC wnl, TB 7.8, transaminitis.  US with 7 mm CBD, mild intra/extrahepatic biliary ductal dilatation with debris in CBD.     PLAN:  - No acute surgical intervention  - Please obtain CT AP with IV contrast  - Patient at the moment stable, low concern for cholangitis.   - Recommend GI consult for possible ERCP  - Recommend medical w/u of anemia and prior CT scan findings.   - Recommend transfusing >7        Peg Santana PGY2  E Team surgery  [id number]

## 2024-04-17 NOTE — ED ADULT NURSE NOTE - OBJECTIVE STATEMENT
Er pt sent by PMD for progressive jaundice over the last 3 wks, pt is AOX 3 ambulatory to ER, denies ABD pain, no CP, no SOB, no dizziness, Hx. Colon Ca with colostomy in place, lorena. pedal edema L>RR labs sent, IV to Right AC 20g angio cath place, pt to U/S.    Pending dispo.      Iliana Posey RN

## 2024-04-17 NOTE — CONSULT NOTE ADULT - SUBJECTIVE AND OBJECTIVE BOX
Chief Complaint:  Patient is a 63y old  Male who presents with a chief complaint of     HPI:JR ARIELA is a 63y Male    PMHX/PSHX:  No pertinent past medical history      Allergies:  No Known Allergies      Home Medications: reviewed  Hospital Medications:      Social History:   Tob: Denies  EtOH: Denies  Illicit Drugs: Denies    Family history:    Denies family history of colon cancer/polyps, stomach cancer/polyps, pancreatic cancer/masses, liver cancer/disease, ovarian cancer and endometrial cancer.    ROS:   General:  No  fevers, chills, night sweats, fatigue  Eyes:  Good vision, no reported pain  ENT:  No sore throat, pain, runny nose  CV:  No pain, palpitations  Pulm:  No dyspnea, cough  GI:  See HPI, otherwise negative  :  No  incontinence, nocturia  Muscle:  No pain, weakness  Neuro:  No memory problems  Psych:  No insomnia, mood problems, depression  Endocrine:  No polyuria, polydipsia, cold/heat intolerance  Heme:  No petechiae, ecchymosis, easy bruisability  Skin:  No rash    PHYSICAL EXAM:   Vital Signs:  Vital Signs Last 24 Hrs  T(C): 36.5 (17 Apr 2024 08:10), Max: 36.5 (17 Apr 2024 08:10)  T(F): 97.7 (17 Apr 2024 08:10), Max: 97.7 (17 Apr 2024 08:10)  HR: 95 (17 Apr 2024 10:18) (95 - 102)  BP: 131/77 (17 Apr 2024 10:18) (126/57 - 131/77)  BP(mean): --  RR: 20 (17 Apr 2024 11:04) (18 - 20)  SpO2: 100% (17 Apr 2024 11:04) (99% - 100%)    Parameters below as of 17 Apr 2024 11:04  Patient On (Oxygen Delivery Method): room air      Daily     Daily     GENERAL: no acute distress  NEURO: alert  HEENT: anicteric sclera, no conjunctival pallor appreciated  CHEST: no respiratory distress, no accessory muscle use  CARDIAC: regular rate, rhythm  ABDOMEN: soft, non-tender, non-distended, no rebound or guarding  EXTREMITIES: warm, well perfused, no edema  SKIN: no lesions noted    LABS:                          6.5    11.96 )-----------( 59       ( 17 Apr 2024 09:25 )             20.2     04-17    136  |  101  |  21  ----------------------------<  155<H>  3.3<L>   |  21<L>  |  1.08    Ca    7.8<L>      17 Apr 2024 09:25    TPro  6.2  /  Alb  2.9<L>  /  TBili  7.8<H>  /  DBili  6.9<H>  /  AST  118<H>  /  ALT  81<H>  /  AlkPhos  681<H>  04-17    LIVER FUNCTIONS - ( 17 Apr 2024 09:25 )  Alb: 2.9 g/dL / Pro: 6.2 g/dL / ALK PHOS: 681 U/L / ALT: 81 U/L / AST: 118 U/L / GGT: x               Diagnostic Studies:    Liver: Enlarged.  Bile ducts: Intrahepatic biliary ductal dilatation. Common bile duct   measures 7 mm with intraluminal debris.  Gallbladder: Cholelithiasis and wall thickening measuring 5 mm. Negative   sonographic Mccormack sign.  Pancreas: Visualized portions are within normal limits.  Spleen: 18.1 cm. Enlarged.  Right kidney: 14.0 cm. Mild hydronephrosis which improves status post   postvoid.  Left kidney: 13.9 cm. Mild hydronephrosis without significant change   postvoid.  Bladder: Bilateral urinaryjets seen. Prevoid volume 186 cc. Postvoid   volume 86 cc.  Ascites: Small ascites.  Aorta and IVC: Visualized portions are within normal limits.    IMPRESSION:  Mild intrahepatic and extrahepatic biliary ductal dilatation with debris   in the CBD.    Cholelithiasis and gallbladder wall thickening. Negative sonographic   Mccormack sign.    Hepatosplenomegaly.    Mild bilateral hydronephrosis which mildly improved on the right status   post postvoid.         Chief Complaint:  Patient is a 63y old  Male who presents with a chief complaint of     HPI:JR ARIELA is a 63y Male with history of prostate ca s/p chemoRT 8 years PTA, sigmoid diverticulitis/colovesicular fistula s/p Mati's 8/2016 with 2 aborted reversal attempts (11/2016, 6/2017) due to severe pelvic fibrosis/scar tissue, s/p colonoscopy 2018 without significant findings, noted to have parastomal hernia c/b skin excoriation and referred to Dr. Ramos for repair as well c/b peristomal bleeding. During pre-op evaluation, CT 1/2024 noted to have new extensive new infiltrative soft tissue within the liver along the portal triads, and within both kidneys, with splenomegaly and abnormal femoral marrow., findings concerning for possible lymphoma. Patient states he comes to the hospital today due to the peristomal bleeding; per surgery, patient instructed to go to the ED after being found to be icteric with elevated bili during pre-op work-up. GI consulted for painless jaundice, subsequently noted to have intra/extrahepatic biliary ductal dilatation with debris in CBD concerning for choledocholithiasis.     Reporting yellowing of skin and eyes x 3 weeks and unintentional weight loss of 5 pounds in 3 weeks. Patient otherwise states he feels fine. No f/c, n/v/d constipation, abdominal pain, or back pain. Denies fhx of GI malignancy; reports strong fhx of prostate cancer. Denies any red blood in the stool, reporting only brown stool no pale colored stool/dark colored urine.     Note to have wbc 12, hgb 6.5, , BUN/Cr 21/1.08, Bili 7.8, , AST//81. Give 1 pRBC. HDS      PMHX/PSHX:  Prostate cancer  Allergies:  No Known Allergies      Home Medications: reviewed  Hospital Medications:      Social History:   Tob: Denies  EtOH: Denies  Illicit Drugs: Denies    Family history:    Prostate cancer     ROS:   Complete and normal except as mentioned above.    PHYSICAL EXAM:   Vital Signs:  Vital Signs Last 24 Hrs  T(C): 36.5 (17 Apr 2024 08:10), Max: 36.5 (17 Apr 2024 08:10)  T(F): 97.7 (17 Apr 2024 08:10), Max: 97.7 (17 Apr 2024 08:10)  HR: 95 (17 Apr 2024 10:18) (95 - 102)  BP: 131/77 (17 Apr 2024 10:18) (126/57 - 131/77)  BP(mean): --  RR: 20 (17 Apr 2024 11:04) (18 - 20)  SpO2: 100% (17 Apr 2024 11:04) (99% - 100%)    Parameters below as of 17 Apr 2024 11:04  Patient On (Oxygen Delivery Method): room air      Daily     Daily     GENERAL: no acute distress  NEURO: aox3  HEENT: anicteric sclera, no conjunctival pallor appreciated  CHEST: no respiratory distress, no accessory muscle use  CARDIAC: regular rate, rhythm  ABDOMEN: soft, non-tender, non-distended, no rebound or guarding; ostomy bag with brown stool  EXTREMITIES: warm, well perfused, no edema  SKIN: no lesions noted    LABS:                          6.5    11.96 )-----------( 59       ( 17 Apr 2024 09:25 )             20.2     04-17    136  |  101  |  21  ----------------------------<  155<H>  3.3<L>   |  21<L>  |  1.08    Ca    7.8<L>      17 Apr 2024 09:25    TPro  6.2  /  Alb  2.9<L>  /  TBili  7.8<H>  /  DBili  6.9<H>  /  AST  118<H>  /  ALT  81<H>  /  AlkPhos  681<H>  04-17    LIVER FUNCTIONS - ( 17 Apr 2024 09:25 )  Alb: 2.9 g/dL / Pro: 6.2 g/dL / ALK PHOS: 681 U/L / ALT: 81 U/L / AST: 118 U/L / GGT: x               Diagnostic Studies:  US 4/17  Liver: Enlarged.  Bile ducts: Intrahepatic biliary ductal dilatation. Common bile duct   measures 7 mm with intraluminal debris.  Gallbladder: Cholelithiasis and wall thickening measuring 5 mm. Negative   sonographic Mccormack sign.  Pancreas: Visualized portions are within normal limits.  Spleen: 18.1 cm. Enlarged.  Right kidney: 14.0 cm. Mild hydronephrosis which improves status post   postvoid.  Left kidney: 13.9 cm. Mild hydronephrosis without significant change   postvoid.  Bladder: Bilateral urinaryjets seen. Prevoid volume 186 cc. Postvoid   volume 86 cc.  Ascites: Small ascites.  Aorta and IVC: Visualized portions are within normal limits.    IMPRESSION:  Mild intrahepatic and extrahepatic biliary ductal dilatation with debris   in the CBD.    Cholelithiasis and gallbladder wall thickening. Negative sonographic   Mccormack sign.    Hepatosplenomegaly.    Mild bilateral hydronephrosis which mildly improved on the right status   post postvoid.    CT 1/2024:  COMPARISON: CT 5/1/2017.    CONTRAST/COMPLICATIONS:  IV Contrast: Omnipaque 350  90 cc administered   10 cc discarded  Oral Contrast: NONE  Complications: None reported at time of study completion    PROCEDURE:  CT of the Abdomen and Pelvis was performed.  Precontrast images were obtained through the pelvis. Rectal contrast was administered as requested.  Sagittal and coronal reformats were performed.    FINDINGS:  LOWER CHEST: Coronary artery calcifications.    LIVER: Mild diffuse periportal edema/thickening of unknown etiology, new since 2017.  BILE DUCTS: Normal caliber.  GALLBLADDER: Within normal limits.  SPLEEN: Splenomegaly. The spleen measures 16.2 cm in length, also new.  PANCREAS: Within normal limits.  ADRENALS: Within normal limits.  KIDNEYS/URETERS: Bilateral diffuse infiltrative soft tissue throughout the collecting systems and around the kidneys without hydronephrosis or renal cortical mass.    BLADDER: Small amount of rectal contrast within the posterior bladder wall, compatible with history of known colovesical fistula. Previously noted bladder wall thickening has resolved.  REPRODUCTIVE ORGANS: Fiducials noted within the prostate.    BOWEL: Sigmoid resection with Hewitt's pouch and left lower quadrant colostomy. Left lower quadrant parastomal hernia contains nonobstructed small bowel. Rectal contrast communicates with a small ill-defined extraluminal presacral mass/collapsed collection above the Hewitt's pouch measuring approximately 2.7 x 2.2 cm (303, 97). This abuts pelvic small bowel without extravasated rectal contrast within small bowel to indicate fistula. Right lower quadrant small bowel anastomosis. No bowel obstruction. Appendix is normal.  PERITONEUM: No ascites.  VESSELS: Atherosclerotic changes.  RETROPERITONEUM/LYMPH NODES: No lymphadenopathy. Scattered subcentimeter retroperitoneal and pelvic nodes are unchanged.  ABDOMINAL WALL: Within normal limits.  BONES: Degenerative changes. Marrow replacement suspected in both proximal femoral shafts. Old right posterior 11th rib fracture with callus.    IMPRESSION:  Hewitt's pouch with left lower quadrant colostomy with parastomal hernia.    Small residual colovesical fistula involving the posterior bladder wall. Rectal contrast also communicates with a small ill-defined extraluminal presacral mass/collapsed collection above the Hewitt's pouch.    Extensive new infiltrative soft tissue within the liver along the portal triads, and within both kidneys, with splenomegaly and abnormal femoral marrow. Although indeterminate, differential possibilities favor lymphoma, with less common entities including IgG4, etc.     Chief Complaint:  Patient is a 63y old  Male who presents with a chief complaint of     HPI:JR ARIELA is a 63y Male with history of prostate ca s/p chemoRT 8 years PTA, sigmoid diverticulitis/colovesicular fistula s/p Mati's 8/2016 with 2 aborted reversal attempts (11/2016, 6/2017) due to severe pelvic fibrosis/scar tissue, s/p colonoscopy 2018 without significant findings, noted to have parastomal hernia c/b skin excoriation and referred to Dr. Ramos for repair as well c/b peristomal bleeding. During pre-op evaluation, CT 1/2024 noted to have new extensive new infiltrative soft tissue within the liver along the portal triads, and within both kidneys, with splenomegaly and abnormal femoral marrow., findings concerning for possible lymphoma. Patient states he comes to the hospital today due to the peristomal bleeding; per surgery, patient instructed to go to the ED after being found to be icteric with elevated bili during pre-op work-up. GI consulted for painless jaundice, subsequently noted to have intra/extrahepatic biliary ductal dilatation with debris in CBD concerning for choledocholithiasis.     Reporting yellowing of skin and eyes x 3 weeks and unintentional weight loss of 5 pounds in 3 weeks. Patient otherwise states he feels fine. No f/c, n/v/d constipation, abdominal pain, or back pain. Denies fhx of GI malignancy; reports strong fhx of prostate cancer. Denies any red blood in the stool, reporting only brown stool no pale colored stool/dark colored urine.     Note to have wbc 12, hgb 6.5, , BUN/Cr 21/1.08, Bili 7.8, , AST//81. Give 1 pRBC. HDS      PMHX/PSHX:  Prostate cancer  Allergies:  No Known Allergies      Home Medications: reviewed  Hospital Medications:      Social History:   Tob: Denies  EtOH: Denies  Illicit Drugs: Denies    Family history:    Prostate cancer     ROS:   Complete and normal except as mentioned above.    PHYSICAL EXAM:   Vital Signs:  Vital Signs Last 24 Hrs  T(C): 36.5 (17 Apr 2024 08:10), Max: 36.5 (17 Apr 2024 08:10)  T(F): 97.7 (17 Apr 2024 08:10), Max: 97.7 (17 Apr 2024 08:10)  HR: 95 (17 Apr 2024 10:18) (95 - 102)  BP: 131/77 (17 Apr 2024 10:18) (126/57 - 131/77)  BP(mean): --  RR: 20 (17 Apr 2024 11:04) (18 - 20)  SpO2: 100% (17 Apr 2024 11:04) (99% - 100%)    Parameters below as of 17 Apr 2024 11:04  Patient On (Oxygen Delivery Method): room air      Daily     Daily     GENERAL: no acute distress  NEURO: aox3  HEENT: scleral icterus  CHEST: no respiratory distress, no accessory muscle use  CARDIAC: regular rate, rhythm  ABDOMEN: soft, non-tender, non-distended, no rebound or guarding; ostomy bag with brown stool  EXTREMITIES: warm, well perfused, no edema  SKIN: jaundice    LABS:                          6.5    11.96 )-----------( 59       ( 17 Apr 2024 09:25 )             20.2     04-17    136  |  101  |  21  ----------------------------<  155<H>  3.3<L>   |  21<L>  |  1.08    Ca    7.8<L>      17 Apr 2024 09:25    TPro  6.2  /  Alb  2.9<L>  /  TBili  7.8<H>  /  DBili  6.9<H>  /  AST  118<H>  /  ALT  81<H>  /  AlkPhos  681<H>  04-17    LIVER FUNCTIONS - ( 17 Apr 2024 09:25 )  Alb: 2.9 g/dL / Pro: 6.2 g/dL / ALK PHOS: 681 U/L / ALT: 81 U/L / AST: 118 U/L / GGT: x               Diagnostic Studies:  US 4/17  Liver: Enlarged.  Bile ducts: Intrahepatic biliary ductal dilatation. Common bile duct   measures 7 mm with intraluminal debris.  Gallbladder: Cholelithiasis and wall thickening measuring 5 mm. Negative   sonographic Mccormack sign.  Pancreas: Visualized portions are within normal limits.  Spleen: 18.1 cm. Enlarged.  Right kidney: 14.0 cm. Mild hydronephrosis which improves status post   postvoid.  Left kidney: 13.9 cm. Mild hydronephrosis without significant change   postvoid.  Bladder: Bilateral urinaryjets seen. Prevoid volume 186 cc. Postvoid   volume 86 cc.  Ascites: Small ascites.  Aorta and IVC: Visualized portions are within normal limits.    IMPRESSION:  Mild intrahepatic and extrahepatic biliary ductal dilatation with debris   in the CBD.    Cholelithiasis and gallbladder wall thickening. Negative sonographic   Mccormack sign.    Hepatosplenomegaly.    Mild bilateral hydronephrosis which mildly improved on the right status   post postvoid.    CT 1/2024:  COMPARISON: CT 5/1/2017.    CONTRAST/COMPLICATIONS:  IV Contrast: Omnipaque 350  90 cc administered   10 cc discarded  Oral Contrast: NONE  Complications: None reported at time of study completion    PROCEDURE:  CT of the Abdomen and Pelvis was performed.  Precontrast images were obtained through the pelvis. Rectal contrast was administered as requested.  Sagittal and coronal reformats were performed.    FINDINGS:  LOWER CHEST: Coronary artery calcifications.    LIVER: Mild diffuse periportal edema/thickening of unknown etiology, new since 2017.  BILE DUCTS: Normal caliber.  GALLBLADDER: Within normal limits.  SPLEEN: Splenomegaly. The spleen measures 16.2 cm in length, also new.  PANCREAS: Within normal limits.  ADRENALS: Within normal limits.  KIDNEYS/URETERS: Bilateral diffuse infiltrative soft tissue throughout the collecting systems and around the kidneys without hydronephrosis or renal cortical mass.    BLADDER: Small amount of rectal contrast within the posterior bladder wall, compatible with history of known colovesical fistula. Previously noted bladder wall thickening has resolved.  REPRODUCTIVE ORGANS: Fiducials noted within the prostate.    BOWEL: Sigmoid resection with Hewitt's pouch and left lower quadrant colostomy. Left lower quadrant parastomal hernia contains nonobstructed small bowel. Rectal contrast communicates with a small ill-defined extraluminal presacral mass/collapsed collection above the Hewitt's pouch measuring approximately 2.7 x 2.2 cm (303, 97). This abuts pelvic small bowel without extravasated rectal contrast within small bowel to indicate fistula. Right lower quadrant small bowel anastomosis. No bowel obstruction. Appendix is normal.  PERITONEUM: No ascites.  VESSELS: Atherosclerotic changes.  RETROPERITONEUM/LYMPH NODES: No lymphadenopathy. Scattered subcentimeter retroperitoneal and pelvic nodes are unchanged.  ABDOMINAL WALL: Within normal limits.  BONES: Degenerative changes. Marrow replacement suspected in both proximal femoral shafts. Old right posterior 11th rib fracture with callus.    IMPRESSION:  Hewitt's pouch with left lower quadrant colostomy with parastomal hernia.    Small residual colovesical fistula involving the posterior bladder wall. Rectal contrast also communicates with a small ill-defined extraluminal presacral mass/collapsed collection above the Hewitt's pouch.    Extensive new infiltrative soft tissue within the liver along the portal triads, and within both kidneys, with splenomegaly and abnormal femoral marrow. Although indeterminate, differential possibilities favor lymphoma, with less common entities including IgG4, etc.

## 2024-04-18 LAB
A1C WITH ESTIMATED AVERAGE GLUCOSE RESULT: 4.7 % — SIGNIFICANT CHANGE UP (ref 4–5.6)
ALBUMIN SERPL ELPH-MCNC: 2.8 G/DL — LOW (ref 3.3–5)
ALP SERPL-CCNC: 634 U/L — HIGH (ref 40–120)
ALT FLD-CCNC: 78 U/L — HIGH (ref 4–41)
ANION GAP SERPL CALC-SCNC: 14 MMOL/L — SIGNIFICANT CHANGE UP (ref 7–14)
APTT BLD: 35.4 SEC — SIGNIFICANT CHANGE UP (ref 24.5–35.6)
AST SERPL-CCNC: 119 U/L — HIGH (ref 4–40)
BASOPHILS # BLD AUTO: 0.02 K/UL — SIGNIFICANT CHANGE UP (ref 0–0.2)
BASOPHILS NFR BLD AUTO: 0.2 % — SIGNIFICANT CHANGE UP (ref 0–2)
BILIRUB SERPL-MCNC: 8.4 MG/DL — HIGH (ref 0.2–1.2)
BLD GP AB SCN SERPL QL: NEGATIVE — SIGNIFICANT CHANGE UP
BUN SERPL-MCNC: 19 MG/DL — SIGNIFICANT CHANGE UP (ref 7–23)
C3 SERPL-MCNC: 111 MG/DL — SIGNIFICANT CHANGE UP (ref 90–180)
C4 SERPL-MCNC: 24 MG/DL — SIGNIFICANT CHANGE UP (ref 10–40)
CALCIUM SERPL-MCNC: 7.7 MG/DL — LOW (ref 8.4–10.5)
CHLORIDE SERPL-SCNC: 104 MMOL/L — SIGNIFICANT CHANGE UP (ref 98–107)
CO2 SERPL-SCNC: 18 MMOL/L — LOW (ref 22–31)
CREAT SERPL-MCNC: 0.97 MG/DL — SIGNIFICANT CHANGE UP (ref 0.5–1.3)
EGFR: 87 ML/MIN/1.73M2 — SIGNIFICANT CHANGE UP
EOSINOPHIL # BLD AUTO: 0.02 K/UL — SIGNIFICANT CHANGE UP (ref 0–0.5)
EOSINOPHIL NFR BLD AUTO: 0.2 % — SIGNIFICANT CHANGE UP (ref 0–6)
ESTIMATED AVERAGE GLUCOSE: 88 — SIGNIFICANT CHANGE UP
FERRITIN SERPL-MCNC: 530 NG/ML — HIGH (ref 30–400)
FOLATE SERPL-MCNC: 15.6 NG/ML — SIGNIFICANT CHANGE UP (ref 3.1–17.5)
GLUCOSE BLDC GLUCOMTR-MCNC: 155 MG/DL — HIGH (ref 70–99)
GLUCOSE BLDC GLUCOMTR-MCNC: 180 MG/DL — HIGH (ref 70–99)
GLUCOSE BLDC GLUCOMTR-MCNC: 202 MG/DL — HIGH (ref 70–99)
GLUCOSE SERPL-MCNC: 104 MG/DL — HIGH (ref 70–99)
HCT VFR BLD CALC: 22.9 % — LOW (ref 39–50)
HCV AB S/CO SERPL IA: 0.11 S/CO — SIGNIFICANT CHANGE UP (ref 0–0.99)
HCV AB SERPL-IMP: SIGNIFICANT CHANGE UP
HGB BLD-MCNC: 7.8 G/DL — LOW (ref 13–17)
IANC: 5.54 K/UL — SIGNIFICANT CHANGE UP (ref 1.8–7.4)
IGG FLD-MCNC: 952 MG/DL — SIGNIFICANT CHANGE UP (ref 610–1660)
IGG1 SER-MCNC: 540 MG/DL — SIGNIFICANT CHANGE UP (ref 240–1118)
IGG2 SER-MCNC: 277 MG/DL — SIGNIFICANT CHANGE UP (ref 124–549)
IGG3 SER-MCNC: 107.1 MG/DL — HIGH (ref 15–102)
IGG4 SER-MCNC: 18.9 MG/DL — SIGNIFICANT CHANGE UP (ref 1–123)
IMM GRANULOCYTES NFR BLD AUTO: 9.5 % — HIGH (ref 0–0.9)
INR BLD: 1.16 RATIO — SIGNIFICANT CHANGE UP (ref 0.85–1.18)
IRON SATN MFR SERPL: 46 % — SIGNIFICANT CHANGE UP (ref 14–50)
IRON SATN MFR SERPL: 96 UG/DL — SIGNIFICANT CHANGE UP (ref 45–165)
LYMPHOCYTES # BLD AUTO: 1.91 K/UL — SIGNIFICANT CHANGE UP (ref 1–3.3)
LYMPHOCYTES # BLD AUTO: 19.7 % — SIGNIFICANT CHANGE UP (ref 13–44)
MCHC RBC-ENTMCNC: 33.9 PG — SIGNIFICANT CHANGE UP (ref 27–34)
MCHC RBC-ENTMCNC: 34.1 GM/DL — SIGNIFICANT CHANGE UP (ref 32–36)
MCV RBC AUTO: 99.6 FL — SIGNIFICANT CHANGE UP (ref 80–100)
MONOCYTES # BLD AUTO: 1.28 K/UL — HIGH (ref 0–0.9)
MONOCYTES NFR BLD AUTO: 13.2 % — SIGNIFICANT CHANGE UP (ref 2–14)
NEUTROPHILS # BLD AUTO: 5.54 K/UL — SIGNIFICANT CHANGE UP (ref 1.8–7.4)
NEUTROPHILS NFR BLD AUTO: 57.2 % — SIGNIFICANT CHANGE UP (ref 43–77)
NRBC # BLD: 0 /100 WBCS — SIGNIFICANT CHANGE UP (ref 0–0)
NRBC # FLD: 0.02 K/UL — HIGH (ref 0–0)
PLATELET # BLD AUTO: 51 K/UL — LOW (ref 150–400)
POTASSIUM SERPL-MCNC: 3.4 MMOL/L — LOW (ref 3.5–5.3)
POTASSIUM SERPL-SCNC: 3.4 MMOL/L — LOW (ref 3.5–5.3)
PROT SERPL-MCNC: 5.9 G/DL — LOW (ref 6–8.3)
PROT SERPL-MCNC: 5.9 G/DL — LOW (ref 6–8.3)
PROTHROM AB SERPL-ACNC: 13.1 SEC — HIGH (ref 9.5–13)
RBC # BLD: 2.3 M/UL — LOW (ref 4.2–5.8)
RBC # BLD: 2.3 M/UL — LOW (ref 4.2–5.8)
RBC # FLD: 27.5 % — HIGH (ref 10.3–14.5)
RETICS #: 182.6 K/UL — HIGH (ref 25–125)
RETICS/RBC NFR: 7.9 % — HIGH (ref 0.5–2.5)
RH IG SCN BLD-IMP: POSITIVE — SIGNIFICANT CHANGE UP
SODIUM SERPL-SCNC: 136 MMOL/L — SIGNIFICANT CHANGE UP (ref 135–145)
TIBC SERPL-MCNC: 208 UG/DL — LOW (ref 220–430)
UIBC SERPL-MCNC: 112 UG/DL — SIGNIFICANT CHANGE UP (ref 110–370)
VIT B12 SERPL-MCNC: 1547 PG/ML — HIGH (ref 200–900)
WBC # BLD: 9.69 K/UL — SIGNIFICANT CHANGE UP (ref 3.8–10.5)
WBC # FLD AUTO: 9.69 K/UL — SIGNIFICANT CHANGE UP (ref 3.8–10.5)

## 2024-04-18 PROCEDURE — 71260 CT THORAX DX C+: CPT | Mod: 26

## 2024-04-18 PROCEDURE — 84165 PROTEIN E-PHORESIS SERUM: CPT | Mod: 26

## 2024-04-18 PROCEDURE — 99232 SBSQ HOSP IP/OBS MODERATE 35: CPT | Mod: GC

## 2024-04-18 PROCEDURE — 99222 1ST HOSP IP/OBS MODERATE 55: CPT | Mod: GC

## 2024-04-18 PROCEDURE — 74177 CT ABD & PELVIS W/CONTRAST: CPT | Mod: 26

## 2024-04-18 RX ORDER — CHLORHEXIDINE GLUCONATE 213 G/1000ML
1 SOLUTION TOPICAL DAILY
Refills: 0 | Status: DISCONTINUED | OUTPATIENT
Start: 2024-04-18 | End: 2024-04-26

## 2024-04-18 RX ORDER — INSULIN LISPRO 100/ML
VIAL (ML) SUBCUTANEOUS EVERY 6 HOURS
Refills: 0 | Status: DISCONTINUED | OUTPATIENT
Start: 2024-04-18 | End: 2024-04-19

## 2024-04-18 RX ORDER — POTASSIUM CHLORIDE 20 MEQ
40 PACKET (EA) ORAL EVERY 4 HOURS
Refills: 0 | Status: COMPLETED | OUTPATIENT
Start: 2024-04-18 | End: 2024-04-18

## 2024-04-18 RX ORDER — SODIUM CHLORIDE 9 MG/ML
1000 INJECTION, SOLUTION INTRAVENOUS
Refills: 0 | Status: DISCONTINUED | OUTPATIENT
Start: 2024-04-18 | End: 2024-04-26

## 2024-04-18 RX ORDER — DEXTROSE 10 % IN WATER 10 %
125 INTRAVENOUS SOLUTION INTRAVENOUS ONCE
Refills: 0 | Status: DISCONTINUED | OUTPATIENT
Start: 2024-04-18 | End: 2024-04-26

## 2024-04-18 RX ADMIN — Medication 1 TABLET(S): at 12:31

## 2024-04-18 RX ADMIN — Medication 1: at 13:02

## 2024-04-18 RX ADMIN — LOSARTAN POTASSIUM 100 MILLIGRAM(S): 100 TABLET, FILM COATED ORAL at 07:25

## 2024-04-18 RX ADMIN — ATORVASTATIN CALCIUM 20 MILLIGRAM(S): 80 TABLET, FILM COATED ORAL at 21:46

## 2024-04-18 RX ADMIN — PANTOPRAZOLE SODIUM 40 MILLIGRAM(S): 20 TABLET, DELAYED RELEASE ORAL at 07:20

## 2024-04-18 RX ADMIN — Medication 1: at 07:19

## 2024-04-18 RX ADMIN — Medication 40 MILLIEQUIVALENT(S): at 07:25

## 2024-04-18 RX ADMIN — Medication 1: at 18:48

## 2024-04-18 RX ADMIN — CHLORHEXIDINE GLUCONATE 1 APPLICATION(S): 213 SOLUTION TOPICAL at 13:01

## 2024-04-18 RX ADMIN — Medication 40 MILLIEQUIVALENT(S): at 15:13

## 2024-04-18 RX ADMIN — Medication 100 MILLIGRAM(S): at 12:31

## 2024-04-18 NOTE — PROGRESS NOTE ADULT - ASSESSMENT
# Obstructive jaundice w/o abdominal pain  3 weeks of jaundice with associated, unintentional 5 pound weight loss despite maintenance in regular diet. US with 7 mm CBD, mild intra/extrahepatic biliary ductal dilatation with debris in CBD concerning for choledocholithiasis, cannot r/o malignancy with infiltrative disease. However, given history and initial painless jaundice, may benefit from further imaging to also r/o potential mass. He has recurrent, bleeding (moderate amount) overnight 4/18 from the peristomal region; very clearly coming from the edge of the stoma, where the stoma meets the skin and soaking gauze while manual pressure is applied. May benefit from hemostasis prior to non-urgent ERCP/EUS/FNB with the surgical team given the complication by a peristomal hernia so as to avoid confusion regarding the etiology of potential bleeding post-ERCP.    # Cholelithiasis with GB wall thickening, negative Mccormack sign  # Anemia, likely due to peristomal bleeding c/b peristomal hernia, pending surgical repair  # ?c/f lymphoma  # h/o prostate cancer s/p chemo/radiation and colon resection with ostomy, [facility]  # Peripheral bleeding at ostomy in setting of peristomal hernia - with plans for surgical repair this week; 2 days of increased bleeding at peripheral site per patient  # Prior sigmoid diverticulitis/colovesicular fistula s/p Mati's 8/2016 with 2 aborted reversal attempts (11/2016, 6/2017) due to severe pelvic fibrosis/scar tissue, s/p colonoscopy 2018 without significant findings,    Recommendations  - Will hold off on endoscopic intervention pending CTAP and hemostasis of the peristomal bleeding, once both have been achieved may proceed with endoscopy  - Please reach out to surgery for management of peristomal bleeding c/b peristomal hernia  - Check CTAP IV contrast only  - Can tentatively plan for EGD/ERCP +/- EUS Friday, pending the above  - Diet as tolerated  - NPO after midnight  - Recheck hgb after additional pRBC that was ordered is given  - 3 CBC, CMP, coags; correct electrolyte disturbance via IV  - Transfuse if hgb < 7  - Maintain working IV access  - Ensure adequate hydration  - Surgery following, appreciate recs  - Rest of care per ED    Preliminary note until signed by Attending.    Thank you for involving us in this patient's care. Please reach out if any further questions or concerns.    Kaelyn Rangel MD  Gastroenterology/Hepatology Fellow, PGY-7    NON-URGENT CONSULTS:  Please email [email or web address]  AT NIGHT AND ON WEEKENDS:  Contact on-call GI fellow via answering service ([phone]) from 5pm-8am and on weekends/holidays  MONDAY-FRIDAY 8AM-5PM:  Pager: [phone] ([facility])  Pager: [phone] ([facility])

## 2024-04-18 NOTE — PROGRESS NOTE ADULT - PROBLEM SELECTOR PLAN 2
likely related to bleeding from excoriated skin at stoma  possibly multifactorial with folate/b12 deficiency given macrocytosis, vs reticulocytosis from blood loss    - iron, b12, folate ok, macrocytosis likely 2/2 reticulocytosis  - resumed bleeding from stoma site, surgery consulted, bleeding controlled with silver nitrate  - 2u PRBC for hgb 6.5 on admission, 1u PLT for 51K with active bleeding  - thrombocyotpenia likely related to splenomegaly  - maintain active T&S

## 2024-04-18 NOTE — PROGRESS NOTE ADULT - PROBLEM SELECTOR PLAN 1
ddx includes choledocholithiasis, sludge, pancreatic mass, infiltrative process noted on CT as outpatient  sono without cholelithiasis, and bile duct dilatation    - CT chest abd pelvis w/ IV contrast  - GI consulted, tentative ERCP 4/19  - per CT report, findings concerning for lymphoma vs IgG4 disease, GI planning biopsies during ERCP  - SPEP, serum IgG4 pending

## 2024-04-18 NOTE — PROGRESS NOTE ADULT - SUBJECTIVE AND OBJECTIVE BOX
Chief Complaint:  Patient is a 64y old  Male who presents with a chief complaint of Jaundice (17 Apr 2024 15:42)       Interval Events:   Afebrile and stable  Had peristomal bleeding overnight. No bleeding from the actual ostomy itself reported as confirmed with primary provider and patient  Hgb 7.8 from 6.5 after 1 pRBC given yesterday; additional unit ordered   Denying n/v/abdominal pain, reporting brown stool from actual ostomy     Hospital Medications:  acetaminophen     Tablet .. 650 milliGRAM(s) Oral every 6 hours PRN  allopurinol 100 milliGRAM(s) Oral daily  atorvastatin 20 milliGRAM(s) Oral at bedtime  dextrose 10% Bolus 125 milliLiter(s) IV Bolus once  dextrose 5%. 1000 milliLiter(s) IV Continuous <Continuous>  dextrose 5%. 1000 milliLiter(s) IV Continuous <Continuous>  dextrose 50% Injectable 25 Gram(s) IV Push once  dextrose 50% Injectable 25 Gram(s) IV Push once  dextrose 50% Injectable 12.5 Gram(s) IV Push once  dextrose Oral Gel 15 Gram(s) Oral once  glucagon  Injectable 1 milliGRAM(s) IntraMuscular once  hydrochlorothiazide 25 milliGRAM(s) Oral daily  influenza  Vaccine (HIGH DOSE) 0.7 milliLiter(s) IntraMuscular once  insulin lispro (ADMELOG) corrective regimen sliding scale   SubCutaneous every 6 hours  losartan 100 milliGRAM(s) Oral daily  melatonin 3 milliGRAM(s) Oral at bedtime PRN  multivitamin 1 Tablet(s) Oral daily  ondansetron Injectable 4 milliGRAM(s) IV Push every 8 hours PRN  pantoprazole  Injectable 40 milliGRAM(s) IV Push every 12 hours  potassium chloride    Tablet ER 40 milliEquivalent(s) Oral every 4 hours      ROS:   Complete and normal except as mentioned above.    PHYSICAL EXAM:   Vital Signs:  Vital Signs Last 24 Hrs  T(C): 36.7 (18 Apr 2024 07:20), Max: 37.4 (17 Apr 2024 22:15)  T(F): 98.1 (18 Apr 2024 07:20), Max: 99.3 (17 Apr 2024 22:15)  HR: 97 (18 Apr 2024 07:20) (88 - 100)  BP: 116/60 (18 Apr 2024 07:20) (113/66 - 131/77)  BP(mean): --  RR: 16 (18 Apr 2024 07:20) (16 - 20)  SpO2: 100% (18 Apr 2024 07:20) (97% - 100%)    Parameters below as of 18 Apr 2024 07:20  Patient On (Oxygen Delivery Method): room air      Daily Height in cm: 190.5 (17 Apr 2024 18:57)    Daily     GENERAL: no acute distress  NEURO: aox3  HEENT: scleral icterus  CHEST: no respiratory distress, no accessory muscle use  CARDIAC: regular rate   ABDOMEN: soft, non-tender, non-distended, no rebound or guarding; active peristomal bleeding - no blood coming directly out of the stoma, but has red blood oozing out form the the inferior portion of the stoma, along the edge where the stoma meets the skin and soaking the gauze that is being used to manually apply pressure  EXTREMITIES: warm, well perfused, no edema  SKIN: no lesions noted    LABS:                          7.8    9.69  )-----------( 51       ( 18 Apr 2024 02:05 )             22.9     04-18    136  |  104  |  19  ----------------------------<  104<H>  3.4<L>   |  18<L>  |  0.97    Ca    7.7<L>      18 Apr 2024 02:05    TPro  5.9<L>  /  Alb  2.8<L>  /  TBili  8.4<H>  /  DBili  x   /  AST  119<H>  /  ALT  78<H>  /  AlkPhos  634<H>  04-18    LIVER FUNCTIONS - ( 18 Apr 2024 02:05 )  Alb: 2.8 g/dL / Pro: 5.9 g/dL / ALK PHOS: 634 U/L / ALT: 78 U/L / AST: 119 U/L / GGT: x             Interval Diagnostic Studies:   No new imaging    Chief Complaint:  Patient is a 64y old  Male who presents with a chief complaint of Jaundice (17 Apr 2024 15:42)       Interval Events:   Afebrile and stable  Had peristomal bleeding overnight. No bleeding from the actual ostomy itself reported as confirmed with primary provider and patient  Hgb 7.8 from 6.5 after 1 pRBC given yesterday; additional unit ordered   Denying n/v/abdominal pain, reporting brown stool from actual ostomy     Hospital Medications:  acetaminophen     Tablet .. 650 milliGRAM(s) Oral every 6 hours PRN  allopurinol 100 milliGRAM(s) Oral daily  atorvastatin 20 milliGRAM(s) Oral at bedtime  dextrose 10% Bolus 125 milliLiter(s) IV Bolus once  dextrose 5%. 1000 milliLiter(s) IV Continuous <Continuous>  dextrose 5%. 1000 milliLiter(s) IV Continuous <Continuous>  dextrose 50% Injectable 25 Gram(s) IV Push once  dextrose 50% Injectable 25 Gram(s) IV Push once  dextrose 50% Injectable 12.5 Gram(s) IV Push once  dextrose Oral Gel 15 Gram(s) Oral once  glucagon  Injectable 1 milliGRAM(s) IntraMuscular once  hydrochlorothiazide 25 milliGRAM(s) Oral daily  influenza  Vaccine (HIGH DOSE) 0.7 milliLiter(s) IntraMuscular once  insulin lispro (ADMELOG) corrective regimen sliding scale   SubCutaneous every 6 hours  losartan 100 milliGRAM(s) Oral daily  melatonin 3 milliGRAM(s) Oral at bedtime PRN  multivitamin 1 Tablet(s) Oral daily  ondansetron Injectable 4 milliGRAM(s) IV Push every 8 hours PRN  pantoprazole  Injectable 40 milliGRAM(s) IV Push every 12 hours  potassium chloride    Tablet ER 40 milliEquivalent(s) Oral every 4 hours      ROS:   Complete and normal except as mentioned above.    PHYSICAL EXAM:   Vital Signs:  Vital Signs Last 24 Hrs  T(C): 36.7 (18 Apr 2024 07:20), Max: 37.4 (17 Apr 2024 22:15)  T(F): 98.1 (18 Apr 2024 07:20), Max: 99.3 (17 Apr 2024 22:15)  HR: 97 (18 Apr 2024 07:20) (88 - 100)  BP: 116/60 (18 Apr 2024 07:20) (113/66 - 131/77)  BP(mean): --  RR: 16 (18 Apr 2024 07:20) (16 - 20)  SpO2: 100% (18 Apr 2024 07:20) (97% - 100%)    Parameters below as of 18 Apr 2024 07:20  Patient On (Oxygen Delivery Method): room air      Daily Height in cm: 190.5 (17 Apr 2024 18:57)    Daily     GENERAL: no acute distress  NEURO: aox3  HEENT: scleral icterus  CHEST: no respiratory distress, no accessory muscle use  CARDIAC: regular rate   ABDOMEN: soft, non-tender, non-distended, no rebound or guarding; active peristomal bleeding - no blood coming directly out of the stoma, but has red blood oozing out form the the inferior portion of the stoma, along the edge where the stoma meets the skin and soaking the gauze that is being used to manually apply pressure  EXTREMITIES: warm, well perfused, no edema  SKIN: jaundice    LABS:                          7.8    9.69  )-----------( 51       ( 18 Apr 2024 02:05 )             22.9     04-18    136  |  104  |  19  ----------------------------<  104<H>  3.4<L>   |  18<L>  |  0.97    Ca    7.7<L>      18 Apr 2024 02:05    TPro  5.9<L>  /  Alb  2.8<L>  /  TBili  8.4<H>  /  DBili  x   /  AST  119<H>  /  ALT  78<H>  /  AlkPhos  634<H>  04-18    LIVER FUNCTIONS - ( 18 Apr 2024 02:05 )  Alb: 2.8 g/dL / Pro: 5.9 g/dL / ALK PHOS: 634 U/L / ALT: 78 U/L / AST: 119 U/L / GGT: x             Interval Diagnostic Studies:   No new imaging

## 2024-04-18 NOTE — CONSULT NOTE ADULT - SUBJECTIVE AND OBJECTIVE BOX
COLORECTAL SURGERY CONSULT NOTE    HPI:  64 yo M w/ PMHx of prostate cancer s/p  chemo/radiation 8 years ago and sigmoid diverticulitis/colovesical fistula who is s/p Hewitt's procedure on  08/2016,  s/p 2 aborted reversal attempts in 11/2016 by Dr. Parkinson and again 06/29/2017 at [facility] by Dr. Oconnell due to severe pelvic fibrosis/scar tissue. Patient saw Dr Tan Vargas on 02/28/2024 for possible repair of parastomal hernia. outpatient CT findings of  new periportal edema/thickening, splenomegaly and bilateral diffuse infiltrative soft tissue throughout collecting system and kidneys, findings concerning for possible lymphoma and plan for PET/CT scan for further evaluation.     Patient saw Dr. Tan Ramos 03/14/2024 for evaluation of parastomal hernia and colostomy prolapse with plan for possible repair pending lymphoma w/u.     During pre-surgical eval, found to have painless jaundice, now admitted for w/u. WBC wnl, TB 7.8, transaminitis.  US with 7 mm CBD, mild intra/extrahepatic biliary ductal dilatation with debris in CBD.     Patient seen and evaluated by surgical oncology. Awaiting CT AP with IV contrast and GI for EUS/ERCP.     Colorectal surgery consulted for bleeding at the skin 5 o'clock ostomy site. Patient remains hemodyanamically stable, normotensive, normocadic. H/H on admission 6.5/20.2, Plt 59 s/p 2U pRBC and 1 plt.        PAST MEDICAL HISTORY:  No pertinent past medical history    Prostate cancer    Diverticulitis    Type 2 diabetes mellitus    Gout    Parastomal hernia        PAST SURGICAL HISTORY:  Status post Shelley procedure        MEDICATIONS:  acetaminophen     Tablet .. 650 milliGRAM(s) Oral every 6 hours PRN  allopurinol 100 milliGRAM(s) Oral daily  atorvastatin 20 milliGRAM(s) Oral at bedtime  dextrose 10% Bolus 125 milliLiter(s) IV Bolus once  dextrose 5%. 1000 milliLiter(s) IV Continuous <Continuous>  dextrose 5%. 1000 milliLiter(s) IV Continuous <Continuous>  dextrose 50% Injectable 12.5 Gram(s) IV Push once  dextrose 50% Injectable 25 Gram(s) IV Push once  dextrose 50% Injectable 25 Gram(s) IV Push once  dextrose Oral Gel 15 Gram(s) Oral once  glucagon  Injectable 1 milliGRAM(s) IntraMuscular once  hydrochlorothiazide 25 milliGRAM(s) Oral daily  influenza  Vaccine (HIGH DOSE) 0.7 milliLiter(s) IntraMuscular once  insulin lispro (ADMELOG) corrective regimen sliding scale   SubCutaneous every 6 hours  losartan 100 milliGRAM(s) Oral daily  melatonin 3 milliGRAM(s) Oral at bedtime PRN  multivitamin 1 Tablet(s) Oral daily  ondansetron Injectable 4 milliGRAM(s) IV Push every 8 hours PRN  potassium chloride    Tablet ER 40 milliEquivalent(s) Oral every 4 hours  silver nitrate Applicator 1 Application(s) Topical once      ALLERGIES:  No Known Allergies      VITALS & I/Os:  Vital Signs Last 24 Hrs  T(C): 36.7 (18 Apr 2024 07:20), Max: 37.4 (17 Apr 2024 22:15)  T(F): 98.1 (18 Apr 2024 07:20), Max: 99.3 (17 Apr 2024 22:15)  HR: 97 (18 Apr 2024 07:20) (88 - 100)  BP: 116/60 (18 Apr 2024 07:20) (113/66 - 131/77)  RR: 16 (18 Apr 2024 07:20) (16 - 20)  SpO2: 100% (18 Apr 2024 07:20) (97% - 100%)    Parameters below as of 18 Apr 2024 07:20  Patient On (Oxygen Delivery Method): room air        PHYSICAL EXAM:  Constitutional: well developed, well nourished, NAD. Jaundice  Eyes: scleral icterus   Respiratory: unlabored breathing  Cardiovascular: RRR  Gastrointestinal: abdomen soft, nontender, nondistended. No obvious masses. No peritonitis. Ostomy with soft brown stool. Surrounding ecchymosis. Skin breakdown at 9 to 11 o'clock with granulating base. 5 o'clock greyson-stomal skin site with oozing. Nontender. No erythema, drainage, signs of infection  Extremities: FROM, warm        LABS:                        7.8    9.69  )-----------( 51       ( 18 Apr 2024 02:05 )             22.9     04-18    136  |  104  |  19  ----------------------------<  104<H>  3.4<L>   |  18<L>  |  0.97    Ca    7.7<L>      18 Apr 2024 02:05    TPro  5.9<L>  /  Alb  2.8<L>  /  TBili  8.4<H>  /  DBili  x   /  AST  119<H>  /  ALT  78<H>  /  AlkPhos  634<H>  04-18    Lactate:  04-17 @ 11:49  1.7    PT/INR - ( 18 Apr 2024 02:05 )   PT: 13.1 sec;   INR: 1.16 ratio         PTT - ( 18 Apr 2024 02:05 )  PTT:35.4 sec          Urinalysis Basic - ( 18 Apr 2024 02:05 )    Color: x / Appearance: x / SG: x / pH: x  Gluc: 104 mg/dL / Ketone: x  / Bili: x / Urobili: x   Blood: x / Protein: x / Nitrite: x   Leuk Esterase: x / RBC: x / WBC x   Sq Epi: x / Non Sq Epi: x / Bacteria: x

## 2024-04-18 NOTE — CONSULT NOTE ADULT - ASSESSMENT
62 yo M w/ PMHx of prostate cancer s/p  chemo/radiation 8 years ago and sigmoid diverticulitis/colovesical fistula who is s/p Hewitt's procedure on  08/2016,  s/p 2 aborted reversal attempts being w/u'd outpatient for possible repair of parastomal hernia and colostomy prolapse and lymphoma admitted with painless jaundice and anemia found to have bleeding at greyson-ostomy skin.     Recommendation:   - Greyson-stomal oozing resolved with manual pressure and silver nitrate at bedside   - Transfuse prn per primary   - Patient may f/u outpatient with Dr. Ramos for further evaluation of parastomal hernia and colostomy prolapse      - Will defer at this time pending GI w/u for painless jaundice/hyperbilirubinemia and lymphoma w/u per surg onc   - Global care per primary     Discussed w/ Dr. Richard BHATIA Team Surgery   [id number]   with patient

## 2024-04-18 NOTE — CONSULT NOTE ADULT - ATTENDING COMMENTS
Multiple medical issues, admitted for painless jaundice.  Known colostomy prolapse w/ parastomal hernia  -Parastomal bleeding controlled w/ silver nitrate and pressure  -will continue to monitor  -Jaundice w/u per GI/Surg Onc  -Elective parastomal hernia and colostomy prolapse repair when other issues have resolved
Awaiting GI for ERCP, possible EUS for jaundice.  Parastomal hernia/colostomy prolapse management by colorectal service.  Will need PET/CT for evaluation of malignancy - ordered as outpatient.
Agree with above  US is concerning for debris within the CBD, but given painless jaundice - would evaluate further with cross-sectional imaging  Pending above with plan for ERCP +/- EUS

## 2024-04-18 NOTE — PROGRESS NOTE ADULT - SUBJECTIVE AND OBJECTIVE BOX
Tommy Sparks, PGY-1  Please contact on Teams    ANITHASUSIEJR  64y  Male    Reason for Admission:     SUBJECTIVE/INTERVAL EVENTS: Bleeding from area surrounding ostomy overnight, hemodynamically stable, holding onto transfused blood. Pt seen and examined at bedside.    Physical Exam:   General: NAD, jaundiced  HEENT: PERRL, EOMI, normal sclera and conjunctiva, no oral lesions  Neck: Supple, no JVD  Lungs: CTA bilaterally  Heart: RRR, normal S1S2, no murmurs/rubs/gallops  Abdomen: Soft, ND/NT, oozing blood where ostomy meets skin  Extremities: 2+ peripheral pulses, no cyanosis/clubbing/edema, full ROM  Skin: Warm, well-perfused  Neuro: A&O x3, no focal deficits      Vital Signs Last 24 Hrs  T(C): 36.7 (18 Apr 2024 07:20), Max: 37.4 (17 Apr 2024 22:15)  T(F): 98.1 (18 Apr 2024 07:20), Max: 99.3 (17 Apr 2024 22:15)  HR: 97 (18 Apr 2024 07:20) (88 - 100)  BP: 116/60 (18 Apr 2024 07:20) (113/66 - 129/63)  BP(mean): --  RR: 16 (18 Apr 2024 07:20) (16 - 18)  SpO2: 100% (18 Apr 2024 07:20) (97% - 100%)    Parameters below as of 18 Apr 2024 07:20  Patient On (Oxygen Delivery Method): room air          Consultant(s) Notes Reviewed:  [x] YES  [ ] NO  Care Discussed with Consultants/Other Providers [x] YES  [ ] NO    LABS:                        7.8    9.69  )-----------( 51       ( 18 Apr 2024 02:05 )             22.9                         7.2    8.66  )-----------( 54       ( 17 Apr 2024 18:03 )             22.3                         6.5    11.96 )-----------( 59       ( 17 Apr 2024 09:25 )             20.2                               136    |  104    |  19                  Calcium: 7.7   / iCa: x      (04-18 @ 02:05)    ----------------------------<  104       Magnesium: x                                3.4     |  18     |  0.97             Phosphorous: x        TPro  5.9    /  Alb  2.8    /  TBili  8.4    /  DBili  x      /  AST  119    /  ALT  78     /  AlkPhos  634    18 Apr 2024 02:05    Urinalysis Basic - ( 18 Apr 2024 02:05 )    Color: x / Appearance: x / SG: x / pH: x  Gluc: 104 mg/dL / Ketone: x  / Bili: x / Urobili: x   Blood: x / Protein: x / Nitrite: x   Leuk Esterase: x / RBC: x / WBC x   Sq Epi: x / Non Sq Epi: x / Bacteria: x        RADIOLOGY & ADDITIONAL TESTS:    Imaging Personally Reviewed:  [x] YES  [ ] NO    MEDICATIONS  (STANDING):  allopurinol 100 milliGRAM(s) Oral daily  atorvastatin 20 milliGRAM(s) Oral at bedtime  dextrose 10% Bolus 125 milliLiter(s) IV Bolus once  dextrose 5%. 1000 milliLiter(s) (100 mL/Hr) IV Continuous <Continuous>  dextrose 5%. 1000 milliLiter(s) (50 mL/Hr) IV Continuous <Continuous>  dextrose 50% Injectable 25 Gram(s) IV Push once  dextrose 50% Injectable 25 Gram(s) IV Push once  dextrose 50% Injectable 12.5 Gram(s) IV Push once  dextrose Oral Gel 15 Gram(s) Oral once  glucagon  Injectable 1 milliGRAM(s) IntraMuscular once  hydrochlorothiazide 25 milliGRAM(s) Oral daily  influenza  Vaccine (HIGH DOSE) 0.7 milliLiter(s) IntraMuscular once  insulin lispro (ADMELOG) corrective regimen sliding scale   SubCutaneous every 6 hours  losartan 100 milliGRAM(s) Oral daily  multivitamin 1 Tablet(s) Oral daily  potassium chloride    Tablet ER 40 milliEquivalent(s) Oral every 4 hours  silver nitrate Applicator 1 Application(s) Topical once    MEDICATIONS  (PRN):  acetaminophen     Tablet .. 650 milliGRAM(s) Oral every 6 hours PRN Temp greater or equal to 38C (100.4F), Mild Pain (1 - 3)  melatonin 3 milliGRAM(s) Oral at bedtime PRN Insomnia  ondansetron Injectable 4 milliGRAM(s) IV Push every 8 hours PRN Nausea and/or Vomiting      HEALTH ISSUES - PROBLEM Dx:  Painless jaundice    Anemia due to acute blood loss    Skin excoriation    DM (diabetes mellitus), type 2    Prophylactic measure

## 2024-04-19 ENCOUNTER — TRANSCRIPTION ENCOUNTER (OUTPATIENT)
Age: 64
End: 2024-04-19

## 2024-04-19 LAB
% ALBUMIN: 40.1 % — SIGNIFICANT CHANGE UP
% ALPHA 1: 6.5 % — SIGNIFICANT CHANGE UP
% ALPHA 2: 12.8 % — SIGNIFICANT CHANGE UP
% BETA: 24.9 % — SIGNIFICANT CHANGE UP
% GAMMA: 15.6 % — SIGNIFICANT CHANGE UP
ALBUMIN SERPL ELPH-MCNC: 2.37 G/DL — LOW (ref 3.3–4.4)
ALBUMIN SERPL ELPH-MCNC: 2.5 G/DL — LOW (ref 3.3–5)
ALBUMIN/GLOB SERPL ELPH: 0.7 RATIO — SIGNIFICANT CHANGE UP
ALP SERPL-CCNC: 575 U/L — HIGH (ref 40–120)
ALPHA1 GLOB SERPL ELPH-MCNC: 0.38 G/DL — HIGH (ref 0.1–0.3)
ALPHA2 GLOB SERPL ELPH-MCNC: 0.8 G/DL — SIGNIFICANT CHANGE UP (ref 0.6–1)
ALT FLD-CCNC: 64 U/L — HIGH (ref 4–41)
ANION GAP SERPL CALC-SCNC: 15 MMOL/L — HIGH (ref 7–14)
APTT BLD: 33.4 SEC — SIGNIFICANT CHANGE UP (ref 24.5–35.6)
AST SERPL-CCNC: 96 U/L — HIGH (ref 4–40)
B-GLOBULIN SERPL ELPH-MCNC: 1.47 G/DL — HIGH (ref 0.6–1.1)
BILIRUB SERPL-MCNC: 6.2 MG/DL — HIGH (ref 0.2–1.2)
BLD GP AB SCN SERPL QL: NEGATIVE — SIGNIFICANT CHANGE UP
BUN SERPL-MCNC: 21 MG/DL — SIGNIFICANT CHANGE UP (ref 7–23)
CALCIUM SERPL-MCNC: 7.5 MG/DL — LOW (ref 8.4–10.5)
CHLORIDE SERPL-SCNC: 104 MMOL/L — SIGNIFICANT CHANGE UP (ref 98–107)
CO2 SERPL-SCNC: 18 MMOL/L — LOW (ref 22–31)
CREAT SERPL-MCNC: 1.16 MG/DL — SIGNIFICANT CHANGE UP (ref 0.5–1.3)
EGFR: 70 ML/MIN/1.73M2 — SIGNIFICANT CHANGE UP
GAMMA GLOBULIN: 0.92 G/DL — SIGNIFICANT CHANGE UP (ref 0.7–1.7)
GLUCOSE BLDC GLUCOMTR-MCNC: 136 MG/DL — HIGH (ref 70–99)
GLUCOSE BLDC GLUCOMTR-MCNC: 140 MG/DL — HIGH (ref 70–99)
GLUCOSE BLDC GLUCOMTR-MCNC: 140 MG/DL — HIGH (ref 70–99)
GLUCOSE BLDC GLUCOMTR-MCNC: 142 MG/DL — HIGH (ref 70–99)
GLUCOSE BLDC GLUCOMTR-MCNC: 150 MG/DL — HIGH (ref 70–99)
GLUCOSE BLDC GLUCOMTR-MCNC: 155 MG/DL — HIGH (ref 70–99)
GLUCOSE BLDC GLUCOMTR-MCNC: 167 MG/DL — HIGH (ref 70–99)
GLUCOSE SERPL-MCNC: 138 MG/DL — HIGH (ref 70–99)
HCT VFR BLD CALC: 18.5 % — CRITICAL LOW (ref 39–50)
HCT VFR BLD CALC: 21.6 % — LOW (ref 39–50)
HGB BLD-MCNC: 6.3 G/DL — CRITICAL LOW (ref 13–17)
HGB BLD-MCNC: 7.4 G/DL — LOW (ref 13–17)
INR BLD: 1.18 RATIO — SIGNIFICANT CHANGE UP (ref 0.85–1.18)
MAGNESIUM SERPL-MCNC: 1 MG/DL — CRITICAL LOW (ref 1.6–2.6)
MAGNESIUM SERPL-MCNC: 1.4 MG/DL — LOW (ref 1.6–2.6)
MCHC RBC-ENTMCNC: 33.3 PG — SIGNIFICANT CHANGE UP (ref 27–34)
MCHC RBC-ENTMCNC: 33.9 PG — SIGNIFICANT CHANGE UP (ref 27–34)
MCHC RBC-ENTMCNC: 34.1 GM/DL — SIGNIFICANT CHANGE UP (ref 32–36)
MCHC RBC-ENTMCNC: 34.3 GM/DL — SIGNIFICANT CHANGE UP (ref 32–36)
MCV RBC AUTO: 97.3 FL — SIGNIFICANT CHANGE UP (ref 80–100)
MCV RBC AUTO: 99.5 FL — SIGNIFICANT CHANGE UP (ref 80–100)
MRSA PCR RESULT.: SIGNIFICANT CHANGE UP
NRBC # BLD: 0 /100 WBCS — SIGNIFICANT CHANGE UP (ref 0–0)
NRBC # BLD: 0 /100 WBCS — SIGNIFICANT CHANGE UP (ref 0–0)
NRBC # FLD: 0.02 K/UL — HIGH (ref 0–0)
NRBC # FLD: 0.02 K/UL — HIGH (ref 0–0)
PHOSPHATE SERPL-MCNC: 2.9 MG/DL — SIGNIFICANT CHANGE UP (ref 2.5–4.5)
PLATELET # BLD AUTO: 56 K/UL — LOW (ref 150–400)
PLATELET # BLD AUTO: 59 K/UL — LOW (ref 150–400)
POTASSIUM SERPL-MCNC: 3.8 MMOL/L — SIGNIFICANT CHANGE UP (ref 3.5–5.3)
POTASSIUM SERPL-SCNC: 3.8 MMOL/L — SIGNIFICANT CHANGE UP (ref 3.5–5.3)
PROT PATTERN SERPL ELPH-IMP: SIGNIFICANT CHANGE UP
PROT SERPL-MCNC: 5.4 G/DL — LOW (ref 6–8.3)
PROT SERPL-MCNC: 5.9 G/DL — SIGNIFICANT CHANGE UP
PROTHROM AB SERPL-ACNC: 13.1 SEC — HIGH (ref 9.5–13)
RBC # BLD: 1.86 M/UL — LOW (ref 4.2–5.8)
RBC # BLD: 2.22 M/UL — LOW (ref 4.2–5.8)
RBC # FLD: 26.9 % — HIGH (ref 10.3–14.5)
RBC # FLD: 28.9 % — HIGH (ref 10.3–14.5)
RH IG SCN BLD-IMP: POSITIVE — SIGNIFICANT CHANGE UP
S AUREUS DNA NOSE QL NAA+PROBE: SIGNIFICANT CHANGE UP
SODIUM SERPL-SCNC: 137 MMOL/L — SIGNIFICANT CHANGE UP (ref 135–145)
WBC # BLD: 8.77 K/UL — SIGNIFICANT CHANGE UP (ref 3.8–10.5)
WBC # BLD: 9.16 K/UL — SIGNIFICANT CHANGE UP (ref 3.8–10.5)
WBC # FLD AUTO: 8.77 K/UL — SIGNIFICANT CHANGE UP (ref 3.8–10.5)
WBC # FLD AUTO: 9.16 K/UL — SIGNIFICANT CHANGE UP (ref 3.8–10.5)

## 2024-04-19 PROCEDURE — 99232 SBSQ HOSP IP/OBS MODERATE 35: CPT | Mod: GC

## 2024-04-19 PROCEDURE — 99232 SBSQ HOSP IP/OBS MODERATE 35: CPT

## 2024-04-19 PROCEDURE — 43237 ENDOSCOPIC US EXAM ESOPH: CPT | Mod: GC

## 2024-04-19 RX ORDER — INSULIN LISPRO 100/ML
VIAL (ML) SUBCUTANEOUS AT BEDTIME
Refills: 0 | Status: DISCONTINUED | OUTPATIENT
Start: 2024-04-19 | End: 2024-04-26

## 2024-04-19 RX ORDER — MAGNESIUM SULFATE 500 MG/ML
2 VIAL (ML) INJECTION ONCE
Refills: 0 | Status: COMPLETED | OUTPATIENT
Start: 2024-04-19 | End: 2024-04-19

## 2024-04-19 RX ORDER — INSULIN LISPRO 100/ML
VIAL (ML) SUBCUTANEOUS
Refills: 0 | Status: DISCONTINUED | OUTPATIENT
Start: 2024-04-19 | End: 2024-04-26

## 2024-04-19 RX ADMIN — LOSARTAN POTASSIUM 100 MILLIGRAM(S): 100 TABLET, FILM COATED ORAL at 06:52

## 2024-04-19 RX ADMIN — Medication 1 TABLET(S): at 12:00

## 2024-04-19 RX ADMIN — Medication 25 GRAM(S): at 06:43

## 2024-04-19 RX ADMIN — Medication 100 MILLIGRAM(S): at 12:01

## 2024-04-19 RX ADMIN — Medication 1: at 02:10

## 2024-04-19 RX ADMIN — CHLORHEXIDINE GLUCONATE 1 APPLICATION(S): 213 SOLUTION TOPICAL at 12:08

## 2024-04-19 RX ADMIN — ATORVASTATIN CALCIUM 20 MILLIGRAM(S): 80 TABLET, FILM COATED ORAL at 21:37

## 2024-04-19 RX ADMIN — Medication 25 GRAM(S): at 11:58

## 2024-04-19 NOTE — PROGRESS NOTE ADULT - SUBJECTIVE AND OBJECTIVE BOX
Tommy Sparks, PGY-1  Please contact on Teams    SHANTEJEREMIASJR SUSIE  64y  Male    Reason for Admission:     SUBJECTIVE/INTERVAL EVENTS: Hgb drop seen overnight likely related to bleeding that occurred on 4/18 am prior to being controlled by surgery. No bleeding overnight. Pt seen and examined at bedside.    Physical Exam:   General: NAD, jaundiced  HEENT: PERRL, EOMI, normal sclera and conjunctiva, no oral lesions  Neck: Supple, no JVD  Lungs: CTA bilaterally  Heart: RRR, normal S1S2, no murmurs/rubs/gallops  Abdomen: Soft, ND/NT, controlled bleeding where ostomy meets skin  Extremities: 2+ peripheral pulses, no cyanosis/clubbing/edema, full ROM  Skin: Warm, well-perfused  Neuro: A&O x3, no focal deficits      Vital Signs Last 24 Hrs  T(C): 37 (19 Apr 2024 05:55), Max: 37 (19 Apr 2024 05:55)  T(F): 98.6 (19 Apr 2024 05:55), Max: 98.6 (19 Apr 2024 05:55)  HR: 99 (19 Apr 2024 05:55) (97 - 99)  BP: 129/58 (19 Apr 2024 05:55) (101/49 - 129/58)  BP(mean): --  RR: 16 (19 Apr 2024 05:55) (16 - 18)  SpO2: 100% (19 Apr 2024 05:55) (97% - 100%)    Parameters below as of 19 Apr 2024 05:55  Patient On (Oxygen Delivery Method): room air          Consultant(s) Notes Reviewed:  [x] YES  [ ] NO  Care Discussed with Consultants/Other Providers [x] YES  [ ] NO    LABS:                        6.3    8.77  )-----------( 59       ( 19 Apr 2024 03:43 )             18.5                         7.8    9.69  )-----------( 51       ( 18 Apr 2024 02:05 )             22.9                         7.2    8.66  )-----------( 54       ( 17 Apr 2024 18:03 )             22.3                               137    |  104    |  21                  Calcium: 7.5   / iCa: x      (04-19 @ 03:43)    ----------------------------<  138       Magnesium: 1.00                             3.8     |  18     |  1.16             Phosphorous: 2.9      TPro  5.4    /  Alb  2.5    /  TBili  6.2    /  DBili  x      /  AST  96     /  ALT  64     /  AlkPhos  575    19 Apr 2024 03:43    Urinalysis Basic - ( 19 Apr 2024 03:43 )    Color: x / Appearance: x / SG: x / pH: x  Gluc: 138 mg/dL / Ketone: x  / Bili: x / Urobili: x   Blood: x / Protein: x / Nitrite: x   Leuk Esterase: x / RBC: x / WBC x   Sq Epi: x / Non Sq Epi: x / Bacteria: x        RADIOLOGY & ADDITIONAL TESTS:    Imaging Personally Reviewed:  [x] YES  [ ] NO    MEDICATIONS  (STANDING):  allopurinol 100 milliGRAM(s) Oral daily  atorvastatin 20 milliGRAM(s) Oral at bedtime  chlorhexidine 2% Cloths 1 Application(s) Topical daily  dextrose 10% Bolus 125 milliLiter(s) IV Bolus once  dextrose 5%. 1000 milliLiter(s) (100 mL/Hr) IV Continuous <Continuous>  dextrose 5%. 1000 milliLiter(s) (50 mL/Hr) IV Continuous <Continuous>  dextrose 50% Injectable 12.5 Gram(s) IV Push once  dextrose 50% Injectable 25 Gram(s) IV Push once  dextrose 50% Injectable 25 Gram(s) IV Push once  dextrose Oral Gel 15 Gram(s) Oral once  glucagon  Injectable 1 milliGRAM(s) IntraMuscular once  hydrochlorothiazide 25 milliGRAM(s) Oral daily  influenza  Vaccine (HIGH DOSE) 0.7 milliLiter(s) IntraMuscular once  insulin lispro (ADMELOG) corrective regimen sliding scale   SubCutaneous every 6 hours  losartan 100 milliGRAM(s) Oral daily  multivitamin 1 Tablet(s) Oral daily  silver nitrate Applicator 1 Application(s) Topical once    MEDICATIONS  (PRN):  acetaminophen     Tablet .. 650 milliGRAM(s) Oral every 6 hours PRN Temp greater or equal to 38C (100.4F), Mild Pain (1 - 3)  melatonin 3 milliGRAM(s) Oral at bedtime PRN Insomnia  ondansetron Injectable 4 milliGRAM(s) IV Push every 8 hours PRN Nausea and/or Vomiting      HEALTH ISSUES - PROBLEM Dx:  Painless jaundice    Anemia due to acute blood loss    Skin excoriation    DM (diabetes mellitus), type 2    Prophylactic measure

## 2024-04-19 NOTE — DISCHARGE NOTE PROVIDER - NSDCCPTREATMENT_GEN_ALL_CORE_FT
PRINCIPAL PROCEDURE  Procedure: ERCP  Findings and Treatment:       SECONDARY PROCEDURE  Procedure: CT abdomen  Findings and Treatment:   < end of copied text >  IMPRESSION:  Irregular soft tissue density at Hewitt's pouch may represent recurrence   of disease or postsurgical scarring.  Brachytherapy seeds in the prostate gland, which is slightly irregular in   appearance. Recurrence of disease is possible. Mild lymphadenopathy in   the abdomen and pelvis.  Significant but symmetric soft tissueinfiltration of bilateral kidneys,   perinephric space with extension along the renal hilum and the   vasculature. Both neoplastic and inflammatory processes are considered.  Either periportal edema or intrahepatic biliary ductal dilatation with   soft tissue infiltration. Extrahepatic biliary tree is normal in caliber.  Hepatosplenomegaly with question of small splenic infarcts.  Small right and trace left pleural effusions.  --- End of Report ---< from: CT Abdomen and Pelvis w/ IV Cont (04.18.24 @ 15:00) >       PRINCIPAL PROCEDURE  Procedure: CT biopsy lymph node  Findings and Treatment:   < end of copied text >  INTERPRETATION:  PROCEDURE: COMPUTED TOMOGRAPHY GUIDED CORE NEEDLE BIOPSY   OF RIGHT ILIAC CHAIN LYMPH NODE.  : JANNETTE Matthew MD  CLINICAL INDICATION: 64-year-old male with jaundice, found to have mild   lymphadenopathy in the abdomen and pelvis. Biopsy of right iliac chain   lymph node was requested.  ANESTHESIA: Intravenous sedation was administered by the anesthesiology   attending. A total of 7 mL of 1% lidocaine was used for local anesthesia.  TECHNIQUE: Following discussions of the risks, benefits, and alternatives   to the procedure informed consent was obtained. A timeout was performed.  The patient was placed supine on the CT examination table and connected   to physiologic monitoring. Noncontrast axial images of the pelvis were   obtained to select the best path for percutaneous biopsy. The mildly   enlarged right iliac chain lymph nodes that were seen on a recent   abdominopelvic CT were again identified and one of these lymph nodes was   targeted for biopsy. The overlying skin was prepped and draped in the   usual sterile fashion.  Using CT guidance, a 17-gauge trocar needle was advanced to the targeted   right iliac chain lymph node. An 18-gauge core needle biopsy device was   advanced via the trocar into the lymph node, and 3 core specimens were   obtained. The obtained specimens were deemed adequate by the   cytopathology technologist. D-stat hemostatic agent was administered via   trocar and compression was held to achieve hemostasis. Completion CT   images demonstrated postbiopsy changes with no evidence of acute   complications. A dry sterile dressing was applied.  The patient toleratedthe procedure well and was transferred to the   recovery suite in stable condition. There were no immediate complications.  IMPRESSION: SUCCESSFUL COMPUTED TOMOGRAPHY GUIDED CORE NEEDLE BIOPSY OF   RIGHT ILIAC CHAIN LYMPH NODE.< from: IR Procedure (04.25.24 @ 10:22) >        SECONDARY PROCEDURE  Procedure: CT abdomen  Findings and Treatment:   < end of copied text >  IMPRESSION:  Irregular soft tissue density at Hewitt's pouch may represent recurrence   of disease or postsurgical scarring.  Brachytherapy seeds in the prostate gland, which is slightly irregular in   appearance. Recurrence of disease is possible. Mild lymphadenopathy in   the abdomen and pelvis.  Significant but symmetric soft tissueinfiltration of bilateral kidneys,   perinephric space with extension along the renal hilum and the   vasculature. Both neoplastic and inflammatory processes are considered.  Either periportal edema or intrahepatic biliary ductal dilatation with   soft tissue infiltration. Extrahepatic biliary tree is normal in caliber.  Hepatosplenomegaly with question of small splenic infarcts.  Small right and trace left pleural effusions.  --- End of Report ---< from: CT Abdomen and Pelvis w/ IV Cont (04.18.24 @ 15:00) >      Procedure: MR MRCP  Findings and Treatment:   < end of copied text >  IMPRESSION:  Mild intrahepatic biliary ductal dilatation. No extrahepatic biliary   ductal dilatation or choledocholithiasis. Bilateral urothelial   inflammation along the renal hilum causing bilateral mild hydronephrosis.   Consider systemic infiltrating process, such as IgG for disease.  Splenomegaly. Diffuse low signal of the marrow. Consider lymphoma.< from: MR Abdomen w/wo IV Cont (04.22.24 @ 13:38) >      Procedure: EGD  Findings and Treatment:   < end of copied text >  Findings:       ENDOSCOPIC FINDING: :       The examined esophagus was normal.       Diffuse moderate inflammation characterized by congestion (edema) and        erythemawas found in the stomach. There were snake-like mosaic pattern,        suspicious for portal hypertensive gastropathy.       The examined duodenum was normal.       ENDOSONOGRAPHIC FINDING: :       Endosonographic imaging in the common bile duct showed no stones or        dilation. There was wall-thickening and area of of poor visualization        due to portal edema.       One enlarged lymph node was visualized in the peripancreatic region. It        measured 5 mm in maximal cross-sectional diameter. The node was round,        hypoechoic and had well defined margins.       Pancreatic parenchymal abnormalities were noted in the entire pancreas.        These consisted of hyperechoic foci and lobularity.       The esophagus, stomach and duodenum and adjacent structures were        visualized endosonographically.                                                                                   Impression:          - Normal esophagus.                       - Gastritis.                       -Normal examined duodenum.                       - Normal bile duct with wall-thickening but no evidence                        of stone or mass. Area of poor visualization due to                        portal edema, would obtain a dedicated MRI                  - One enlarged lymph node was visualized in the                        peripancreatic region.                       - Pancreatic parenchymal abnormalities consisting of                        hyperechoic foci and lobularity were notedin the entire                        pancreas.

## 2024-04-19 NOTE — CHART NOTE - NSCHARTNOTEFT_GEN_A_CORE
Brief Update Note    Patient evaluated by CRS for peristomal oozing with hemostasis achieved s/p manual pressure and silver nitrate.  Hgb was 6.3 now 7.4 s/p pRBC.  Assessed patient at bedside, with brown stool in ostomy bag and no evident peristomal bleeding.  Patient denying any additional bleeding since silver nitrate application.    Plan for endoscopic procedure today if no additional bleeding.  See prior note for full recs.        Thank you for involving us in this patient's care. Please reach out if any further questions or concerns    Kaelyn Rangel MD  Gastroenterology/Hepatology Fellow, PGY-7    NON-URGENT CONSULTS:  Please email [email or web address]  AT NIGHT AND ON WEEKENDS:  Contact on-call GI fellow via answering service ([phone]) from 5pm-8am and on weekends/holidays  MONDAY-FRIDAY 8AM-5PM:  Pager: [phone] ([facility])  Pager: [phone] ([facility])

## 2024-04-19 NOTE — DISCHARGE NOTE PROVIDER - PROVIDER TOKENS
PROVIDER:[TOKEN:[[id number]],ESTABLISHEDPATIENT:[T]] PROVIDER:[TOKEN:[[id number]],ESTABLISHEDPATIENT:[T]],PROVIDER:[TOKEN:[[id number]]] PROVIDER:[TOKEN:[[id number]],ESTABLISHEDPATIENT:[T]],PROVIDER:[TOKEN:[[id number]],FOLLOWUP:[1 week],ESTABLISHEDPATIENT:[T]]

## 2024-04-19 NOTE — DISCHARGE NOTE PROVIDER - CARE PROVIDER_API CALL
Tan Ramos  Surgery  [address] [place] [address]  Phone: [phone]  Fax: [phone]  Established Patient  Follow Up Time:    Tan Ramos  Surgery  [address] [place] [address]  Phone: [phone]  Fax: [phone]  Established Patient  Follow Up Time:     Tan VargasYeou  Surgery  [address] [place] [address]  Phone: [phone]  Fax: [phone]  Follow Up Time:    Tan Ramos  Surgery  [address] [place], [place] [address]  Phone: [phone]  Fax: [phone]  Established Patient  Follow Up Time:     Wang, John Hsiang-Yeou  Surgery  [address] [place] [id number]  Phone: [phone]  Fax: [phone]  Established Patient  Follow Up Time: 1 week

## 2024-04-19 NOTE — DISCHARGE NOTE PROVIDER - HOSPITAL COURSE
HPI:  63M, hx T2DM, gout, prostate ca in remission (chemo + rads 2016), sigmoid diverticulitis/colovesical fistula in 2016 s/p Shelley's, with 2 aborted reversal attempts, left with chronic colostomy. Presents from outpatient surgeon's office with painless jaundice. Has been seeing Dr. Ramos for possible repair of parastomal hernia with surrounding skin excoriation. Has completed course of antibiotics as outpatient related to superficial infection at site of stoma. Patient states yellow skin has gradually developed over weeks, he did not notice when it first started.    Notably, patient has had multiple episodes of significant bleeding from the excoriated skin around the stoma within the past month, which filled up his colostomy bag multiple times. Patient denies any bleeding from the colostomy tract itself, and denies dark or bloody stools.    Additionally, patient had CT abdomen pelvis performed in Jan 2024 during workup for hernia repair, which noted diffuse infiltrative soft tissue within the liver and portal triads, and in the bilateral kidneys and collection systems. (17 Apr 2024 15:42)    Hospital Course: Admitted for painless jaundice workup and transfusions for blood loss anemia. Patient received multiple units of PRBCs during day of admission and for 2 days after for recurrent bleeding near the ostomy site. Eventually controlled with direct pressure, silver nitrate, and platelet transfusions due to thrombocytopenia. CT abdomen pelvis performed as inpatient demonstrated similar findings as the outpatient study. Underwent ERCP on 4/19....    The patient is afebrile, hemodynamically stable and medically optimized for discharge to home with follow up with GI, surgery. On day of discharge, patient is clinically stable with no new exam findings or acute symptoms compared to prior. The patient was seen by the attending physician on the date of discharge and deemed stable and acceptable for discharge. The patient's chronic medical conditions were treated accordingly per the patient's home medication regimen. The patient's medication reconciliation (with changes made to chronic medications), follow up appointments, discharge orders, instructions, and significant lab and diagnostic studies are as noted.    Important Medication Changes and Reason:  still unknown    Active or Pending Issues Requiring Follow-up:  still unknown    Advanced Directives:   [X] Full code  [ ] DNR  [ ] Hospice       HPI:  63M, hx T2DM, gout, prostate ca in remission (chemo + rads 2016), sigmoid diverticulitis/colovesical fistula in 2016 s/p Shelley's, with 2 aborted reversal attempts, left with chronic colostomy. Presents from outpatient surgeon's office with painless jaundice. Has been seeing Dr. Ramos for possible repair of parastomal hernia with surrounding skin excoriation. Has completed course of antibiotics as outpatient related to superficial infection at site of stoma. Patient states yellow skin has gradually developed over weeks, he did not notice when it first started.    Notably, patient has had multiple episodes of significant bleeding from the excoriated skin around the stoma within the past month, which filled up his colostomy bag multiple times. Patient denies any bleeding from the colostomy tract itself, and denies dark or bloody stools.    Additionally, patient had CT abdomen pelvis performed in Jan 2024 during workup for hernia repair, which noted diffuse infiltrative soft tissue within the liver and portal triads, and in the bilateral kidneys and collection systems. (17 Apr 2024 15:42)    Hospital Course: Admitted for painless jaundice workup and transfusions for blood loss anemia. Patient received multiple units of PRBCs during day of admission and for 2 days after for recurrent bleeding near the ostomy site. Eventually controlled with direct pressure, silver nitrate, and platelet transfusions due to thrombocytopenia. CT abdomen pelvis performed as inpatient demonstrated similar findings as the outpatient study. Underwent EGD/EUS on 4/19 with evidence of portal hypertension, no biopsies taken. MRCP on 4/22 with similar findings to CT. CT guided biopsy of iliac chain lymph node performed 4/25. Awaiting pathology results. Patient prefers to follow up pathology results as outpatient.    The patient is afebrile, hemodynamically stable and medically optimized for discharge to home with follow up with GI, surgery, medical oncology. On day of discharge, patient is clinically stable with no new exam findings or acute symptoms compared to prior. The patient was seen by the attending physician on the date of discharge and deemed stable and acceptable for discharge. The patient's chronic medical conditions were treated accordingly per the patient's home medication regimen. The patient's medication reconciliation (with changes made to chronic medications), follow up appointments, discharge orders, instructions, and significant lab and diagnostic studies are as noted.    Important Medication Changes and Reason:  none    Active or Pending Issues Requiring Follow-up:  Biopsy follow up with GI, medical oncology  Parastomal hernia & bleeding with surgery    Advanced Directives:   [X] Full code  [ ] DNR  [ ] Hospice

## 2024-04-19 NOTE — DISCHARGE NOTE PROVIDER - CARE PROVIDERS DIRECT ADDRESSES
,DirectAddress_Unknown ,DirectAddress_Unknown,[email or web address]

## 2024-04-19 NOTE — PROGRESS NOTE ADULT - SUBJECTIVE AND OBJECTIVE BOX
E Team Surgery Daily Progress Note    Subjective:   Patient seen at bedside this AM, resting comfortably in bed. Reports no N/V, no abdominal pain, resolution of bleeding from ostomy site following colorectal team's bedside intervention yesterday. Otherwise no other concerns reported per patient.     24h Events:   - Overnight, Hb 6.3 4Am labs, receiving 1U pRBC    Objective:  Vital Signs  T(C): 37 (04-19 @ 05:55), Max: 37 (04-19 @ 05:55)  HR: 99 (04-19 @ 05:55) (97 - 99)  BP: 129/58 (04-19 @ 05:55) (115/55 - 129/58)  RR: 16 (04-19 @ 05:55) (16 - 18)  SpO2: 100% (04-19 @ 05:55) (99% - 100%)  04-18-24 @ 07:01  -  04-19-24 @ 07:00  --------------------------------------------------------  IN:  Total IN: 0 mL    OUT:    Colostomy (mL): 1150 mL  Total OUT: 1150 mL    Total NET: -1150 mL          Physical Exam:  GEN: resting in bed comfortably in NAD  RESP: no increased WOB  ABD: soft, non-distended, jaundice-tinged skin all over body, non-tender without rebound tenderness or guarding  EXTR: warm, grossly intact  NEURO: AAOx4    Labs:                        6.3    8.77  )-----------( 59       ( 19 Apr 2024 03:43 )             18.5   04-19    137  |  104  |  21  ----------------------------<  138<H>  3.8   |  18<L>  |  1.16    Ca    7.5<L>      19 Apr 2024 03:43  Phos  2.9     04-19  Mg     1.00     04-19    TPro  5.4<L>  /  Alb  2.5<L>  /  TBili  6.2<H>  /  DBili  x   /  AST  96<H>  /  ALT  64<H>  /  AlkPhos  575<H>  04-19    CAPILLARY BLOOD GLUCOSE      POCT Blood Glucose.: 155 mg/dL (19 Apr 2024 09:17)  POCT Blood Glucose.: 140 mg/dL (19 Apr 2024 06:51)  POCT Blood Glucose.: 167 mg/dL (19 Apr 2024 02:09)  POCT Blood Glucose.: 202 mg/dL (18 Apr 2024 23:43)  POCT Blood Glucose.: 155 mg/dL (18 Apr 2024 18:06)  POCT Blood Glucose.: 180 mg/dL (18 Apr 2024 12:51)      Medications:   MEDICATIONS  (STANDING):  allopurinol 100 milliGRAM(s) Oral daily  atorvastatin 20 milliGRAM(s) Oral at bedtime  chlorhexidine 2% Cloths 1 Application(s) Topical daily  dextrose 10% Bolus 125 milliLiter(s) IV Bolus once  dextrose 5%. 1000 milliLiter(s) (100 mL/Hr) IV Continuous <Continuous>  dextrose 5%. 1000 milliLiter(s) (50 mL/Hr) IV Continuous <Continuous>  dextrose 50% Injectable 12.5 Gram(s) IV Push once  dextrose 50% Injectable 25 Gram(s) IV Push once  dextrose 50% Injectable 25 Gram(s) IV Push once  dextrose Oral Gel 15 Gram(s) Oral once  glucagon  Injectable 1 milliGRAM(s) IntraMuscular once  hydrochlorothiazide 25 milliGRAM(s) Oral daily  influenza  Vaccine (HIGH DOSE) 0.7 milliLiter(s) IntraMuscular once  insulin lispro (ADMELOG) corrective regimen sliding scale   SubCutaneous every 6 hours  losartan 100 milliGRAM(s) Oral daily  multivitamin 1 Tablet(s) Oral daily  silver nitrate Applicator 1 Application(s) Topical once    MEDICATIONS  (PRN):  acetaminophen     Tablet .. 650 milliGRAM(s) Oral every 6 hours PRN Temp greater or equal to 38C (100.4F), Mild Pain (1 - 3)  melatonin 3 milliGRAM(s) Oral at bedtime PRN Insomnia  ondansetron Injectable 4 milliGRAM(s) IV Push every 8 hours PRN Nausea and/or Vomiting      Imaging:

## 2024-04-19 NOTE — PROGRESS NOTE ADULT - ASSESSMENT
64 y/o male with a history of prostate cancer s/p  chemo/radiation 8 years ago and sigmoid diverticulitis/colovesical fistula who is s/p Hewitt's procedure on  08/2016,  s/p 2 aborted reversal attempts in 11/2016 by Dr. Parkinson and again 06/29/2017 at [facility] by Dr. Oconnell due to severe pelvic fibrosis/scar tissue,Pt saw Dr Tan Vargas on 02/28 for possible repair of parastomal hernia. outpatient CT findings of  new periportal edema/thickening, splenomegaly and bilateral diffuse infiltrative soft tissue throughout collecting system and kidneys, findings concerning for possible lymphoma. During pre surgical eval, found to have painless jaundice. WBC wnl, TB 7.8, transaminitis.  US with 7 mm CBD, mild intra/extrahepatic biliary ductal dilatation with debris in CBD.     PLAN:  - No acute surgical intervention  - Pending ERCP  - Monitor CBC for Hb, transfuse as need to Hb>7  - Rest of care per primary team      E-Team surgery  [id number]        Peg Santana PGY2  E Team surgery  [id number]

## 2024-04-19 NOTE — DISCHARGE NOTE PROVIDER - NSDCFUADDAPPT_GEN_ALL_CORE_FT
Please make an appointment with the above clinics for GI and oncology follow up. Please follow up within 1 week. Please follow up with your surgeon within 1-2 weeks as well to ensure your bleeding is controlled.    APPTS ARE READY TO BE MADE: [X] YES    Best Family or Patient Contact (if needed):    Additional Information about above appointments (if needed):    1: Gastroenterology within 1 week  2: Medical oncology within 1 week  3:     Other comments or requests:    Please make an appointment with the above clinics for GI and oncology follow up. Please follow up within 1 week. Please follow up with your surgeon within 1-2 weeks as well to ensure your bleeding is controlled.    APPTS ARE READY TO BE MADE: [X] YES    Best Family or Patient Contact (if needed):    Additional Information about above appointments (if needed):    1: Gastroenterology within 1 week  2: Medical oncology within 1 week  3: Dr. Vargas- Surgery - in 1 week     Other comments or requests:

## 2024-04-19 NOTE — DISCHARGE NOTE PROVIDER - ATTENDING DISCHARGE PHYSICAL EXAMINATION:
.  VITAL SIGNS:  T(C): 36.9 (04-26-24 @ 14:24), Max: 36.9 (04-26-24 @ 14:24)  T(F): 98.4 (04-26-24 @ 14:24), Max: 98.4 (04-26-24 @ 14:24)  HR: 100 (04-26-24 @ 14:24) (90 - 100)  BP: 117/62 (04-26-24 @ 14:24) (117/62 - 160/75)  BP(mean): --  RR: 18 (04-26-24 @ 14:24) (17 - 18)  SpO2: 100% (04-26-24 @ 14:24) (98% - 100%)  Wt(kg): --    PHYSICAL EXAM:    Constitutional: WDWN resting comfortably in bed; no acute distress  Head: NC/AT  Eyes: PERRL, EOMI,  icteric  conjunctiva  ENT: no nasal discharge;  Neck: supple;   Respiratory: CTA B/L; no W/R/R, no retractions  Cardiac: +S1/S2; RRR;   Gastrointestinal: soft, NT/ND; no rebound or guarding; colostomy bag   Back: spine midline, no bony tenderness or step-offs; no CVAT B/L  Extremities: WWP, no clubbing or cyanosis; no peripheral edema  Musculoskeletal: NROM x4; no joint swelling, tenderness or erythema  Dermatologic: skin  jaundiced   Neurologic: AAOx3; CNII-XII grossly intact; no focal deficits  Psychiatric: affect and characteristics of appearance, verbalizations, behaviors are appropriate

## 2024-04-19 NOTE — DISCHARGE NOTE PROVIDER - NSFOLLOWUPCLINICS_GEN_ALL_ED_FT
[facility]  Hematology/Oncology  [address] [place] [address]  Phone: [phone]  Fax:     Gastroenterology at [facility]  Gastroenterology  [address] [place] [address]  Phone: [phone]  Fax:   Established Patient    [facility]  Gastroenterology  [address] [place] [address]  Phone: [phone]  Fax:   Established Patient

## 2024-04-19 NOTE — DISCHARGE NOTE PROVIDER - NSDCMRMEDTOKEN_GEN_ALL_CORE_FT
allopurinol 100 mg oral tablet: 1 tab(s) orally once a day  atorvastatin 20 mg oral tablet: 1 tab(s) orally once a day  Benicar 40 mg oral tablet: 1 tab(s) orally once a day  clobetasol 0.05% topical ointment: Apply topically to affected area once a day To affected area  hydroCHLOROthiazide 25 mg oral tablet: 1 tab(s) orally once a day  metFORMIN 500 mg oral tablet, extended release: 1 tab(s) orally once a day  Mitigare 0.6 mg oral capsule: 1 cap(s) orally  Multiple Vitamins oral capsule: 1 cap(s) orally once a day

## 2024-04-20 LAB
ALBUMIN SERPL ELPH-MCNC: 2.7 G/DL — LOW (ref 3.3–5)
ALP SERPL-CCNC: 520 U/L — HIGH (ref 40–120)
ALT FLD-CCNC: 52 U/L — HIGH (ref 4–41)
ANION GAP SERPL CALC-SCNC: 16 MMOL/L — HIGH (ref 7–14)
ANISOCYTOSIS BLD QL: SIGNIFICANT CHANGE UP
AST SERPL-CCNC: 63 U/L — HIGH (ref 4–40)
BASOPHILS # BLD AUTO: 0 K/UL — SIGNIFICANT CHANGE UP (ref 0–0.2)
BASOPHILS NFR BLD AUTO: 0 % — SIGNIFICANT CHANGE UP (ref 0–2)
BILIRUB SERPL-MCNC: 7.8 MG/DL — HIGH (ref 0.2–1.2)
BUN SERPL-MCNC: 18 MG/DL — SIGNIFICANT CHANGE UP (ref 7–23)
CALCIUM SERPL-MCNC: 8.2 MG/DL — LOW (ref 8.4–10.5)
CHLORIDE SERPL-SCNC: 101 MMOL/L — SIGNIFICANT CHANGE UP (ref 98–107)
CO2 SERPL-SCNC: 19 MMOL/L — LOW (ref 22–31)
CREAT SERPL-MCNC: 0.99 MG/DL — SIGNIFICANT CHANGE UP (ref 0.5–1.3)
DACRYOCYTES BLD QL SMEAR: SLIGHT — SIGNIFICANT CHANGE UP
EGFR: 85 ML/MIN/1.73M2 — SIGNIFICANT CHANGE UP
EOSINOPHIL # BLD AUTO: 0 K/UL — SIGNIFICANT CHANGE UP (ref 0–0.5)
EOSINOPHIL NFR BLD AUTO: 0 % — SIGNIFICANT CHANGE UP (ref 0–6)
GIANT PLATELETS BLD QL SMEAR: PRESENT — SIGNIFICANT CHANGE UP
GLUCOSE BLDC GLUCOMTR-MCNC: 136 MG/DL — HIGH (ref 70–99)
GLUCOSE BLDC GLUCOMTR-MCNC: 144 MG/DL — HIGH (ref 70–99)
GLUCOSE BLDC GLUCOMTR-MCNC: 144 MG/DL — HIGH (ref 70–99)
GLUCOSE BLDC GLUCOMTR-MCNC: 145 MG/DL — HIGH (ref 70–99)
GLUCOSE BLDC GLUCOMTR-MCNC: 182 MG/DL — HIGH (ref 70–99)
GLUCOSE SERPL-MCNC: 104 MG/DL — HIGH (ref 70–99)
HCT VFR BLD CALC: 22.3 % — LOW (ref 39–50)
HGB BLD-MCNC: 7.2 G/DL — LOW (ref 13–17)
HYPOCHROMIA BLD QL: SLIGHT — SIGNIFICANT CHANGE UP
IANC: 6.69 K/UL — SIGNIFICANT CHANGE UP (ref 1.8–7.4)
IGE SERPL-ACNC: 9 KU/L — SIGNIFICANT CHANGE UP
LYMPHOCYTES # BLD AUTO: 0.95 K/UL — LOW (ref 1–3.3)
LYMPHOCYTES # BLD AUTO: 9.2 % — LOW (ref 13–44)
MACROCYTES BLD QL: SIGNIFICANT CHANGE UP
MAGNESIUM SERPL-MCNC: 1.2 MG/DL — LOW (ref 1.6–2.6)
MANUAL SMEAR VERIFICATION: SIGNIFICANT CHANGE UP
MCHC RBC-ENTMCNC: 32.3 GM/DL — SIGNIFICANT CHANGE UP (ref 32–36)
MCHC RBC-ENTMCNC: 32.3 PG — SIGNIFICANT CHANGE UP (ref 27–34)
MCV RBC AUTO: 100 FL — SIGNIFICANT CHANGE UP (ref 80–100)
METAMYELOCYTES # FLD: 1.8 % — HIGH (ref 0–1)
MONOCYTES # BLD AUTO: 1.23 K/UL — HIGH (ref 0–0.9)
MONOCYTES NFR BLD AUTO: 11.9 % — SIGNIFICANT CHANGE UP (ref 2–14)
MYELOCYTES NFR BLD: 3.7 % — HIGH (ref 0–0)
NEUTROPHILS # BLD AUTO: 7.39 K/UL — SIGNIFICANT CHANGE UP (ref 1.8–7.4)
NEUTROPHILS NFR BLD AUTO: 70.6 % — SIGNIFICANT CHANGE UP (ref 43–77)
NEUTS BAND # BLD: 0.9 % — SIGNIFICANT CHANGE UP (ref 0–6)
NRBC # BLD: 1 /100 WBCS — HIGH (ref 0–0)
PHOSPHATE SERPL-MCNC: 3.6 MG/DL — SIGNIFICANT CHANGE UP (ref 2.5–4.5)
PLAT MORPH BLD: NORMAL — SIGNIFICANT CHANGE UP
PLATELET # BLD AUTO: 65 K/UL — LOW (ref 150–400)
PLATELET COUNT - ESTIMATE: ABNORMAL
POLYCHROMASIA BLD QL SMEAR: SLIGHT — SIGNIFICANT CHANGE UP
POTASSIUM SERPL-MCNC: 3.8 MMOL/L — SIGNIFICANT CHANGE UP (ref 3.5–5.3)
POTASSIUM SERPL-SCNC: 3.8 MMOL/L — SIGNIFICANT CHANGE UP (ref 3.5–5.3)
PROT SERPL-MCNC: 5.8 G/DL — LOW (ref 6–8.3)
RBC # BLD: 2.23 M/UL — LOW (ref 4.2–5.8)
RBC # FLD: 27.1 % — HIGH (ref 10.3–14.5)
RBC BLD AUTO: ABNORMAL
SCHISTOCYTES BLD QL AUTO: SLIGHT — SIGNIFICANT CHANGE UP
SODIUM SERPL-SCNC: 136 MMOL/L — SIGNIFICANT CHANGE UP (ref 135–145)
TARGETS BLD QL SMEAR: SIGNIFICANT CHANGE UP
VARIANT LYMPHS # BLD: 1.9 % — SIGNIFICANT CHANGE UP (ref 0–6)
WBC # BLD: 10.33 K/UL — SIGNIFICANT CHANGE UP (ref 3.8–10.5)
WBC # FLD AUTO: 10.33 K/UL — SIGNIFICANT CHANGE UP (ref 3.8–10.5)

## 2024-04-20 PROCEDURE — 99233 SBSQ HOSP IP/OBS HIGH 50: CPT

## 2024-04-20 PROCEDURE — 93975 VASCULAR STUDY: CPT | Mod: 26

## 2024-04-20 RX ORDER — MAGNESIUM SULFATE 500 MG/ML
2 VIAL (ML) INJECTION ONCE
Refills: 0 | Status: DISCONTINUED | OUTPATIENT
Start: 2024-04-20 | End: 2024-04-21

## 2024-04-20 RX ADMIN — Medication 1 TABLET(S): at 11:18

## 2024-04-20 RX ADMIN — Medication 100 MILLIGRAM(S): at 11:19

## 2024-04-20 RX ADMIN — ATORVASTATIN CALCIUM 20 MILLIGRAM(S): 80 TABLET, FILM COATED ORAL at 22:41

## 2024-04-20 RX ADMIN — CHLORHEXIDINE GLUCONATE 1 APPLICATION(S): 213 SOLUTION TOPICAL at 11:19

## 2024-04-20 RX ADMIN — LOSARTAN POTASSIUM 100 MILLIGRAM(S): 100 TABLET, FILM COATED ORAL at 07:12

## 2024-04-20 NOTE — PROGRESS NOTE ADULT - SUBJECTIVE AND OBJECTIVE BOX
SURGERY DAILY PROGRESS NOTE:     SUBJECTIVE/ROS: Patient seen and evaluated. Reports he is feeling okay. No nausea, vomiting, or abdominal pain.        OBJECTIVE:  Vital Signs Last 24 Hrs  T(C): 37.1 (20 Apr 2024 21:51), Max: 37.1 (20 Apr 2024 21:51)  T(F): 98.7 (20 Apr 2024 21:51), Max: 98.7 (20 Apr 2024 21:51)  HR: 92 (20 Apr 2024 21:51) (91 - 97)  BP: 134/63 (20 Apr 2024 21:51) (108/61 - 134/63)  BP(mean): --  RR: 17 (20 Apr 2024 21:51) (16 - 17)  SpO2: 99% (20 Apr 2024 21:51) (98% - 100%)    Parameters below as of 20 Apr 2024 21:51  Patient On (Oxygen Delivery Method): room air      I&O's Detail    19 Apr 2024 07:01  -  20 Apr 2024 07:00  --------------------------------------------------------  IN:  Total IN: 0 mL    OUT:    Colostomy (mL): 300 mL    Voided (mL): 300 mL  Total OUT: 600 mL    Total NET: -600 mL      20 Apr 2024 07:01  -  20 Apr 2024 22:47  --------------------------------------------------------  IN:    Oral Fluid: 240 mL  Total IN: 240 mL    OUT:    Colostomy (mL): 0 mL    Voided (mL): 300 mL  Total OUT: 300 mL    Total NET: -60 mL        Daily     Daily   MEDICATIONS  (STANDING):  allopurinol 100 milliGRAM(s) Oral daily  atorvastatin 20 milliGRAM(s) Oral at bedtime  chlorhexidine 2% Cloths 1 Application(s) Topical daily  dextrose 10% Bolus 125 milliLiter(s) IV Bolus once  dextrose 5%. 1000 milliLiter(s) (100 mL/Hr) IV Continuous <Continuous>  dextrose 5%. 1000 milliLiter(s) (50 mL/Hr) IV Continuous <Continuous>  dextrose 50% Injectable 12.5 Gram(s) IV Push once  dextrose 50% Injectable 25 Gram(s) IV Push once  dextrose 50% Injectable 25 Gram(s) IV Push once  dextrose Oral Gel 15 Gram(s) Oral once  glucagon  Injectable 1 milliGRAM(s) IntraMuscular once  hydrochlorothiazide 25 milliGRAM(s) Oral daily  influenza  Vaccine (HIGH DOSE) 0.7 milliLiter(s) IntraMuscular once  insulin lispro (ADMELOG) corrective regimen sliding scale   SubCutaneous three times a day before meals  insulin lispro (ADMELOG) corrective regimen sliding scale   SubCutaneous at bedtime  losartan 100 milliGRAM(s) Oral daily  magnesium sulfate  IVPB 2 Gram(s) IV Intermittent once  multivitamin 1 Tablet(s) Oral daily  silver nitrate Applicator 1 Application(s) Topical once    MEDICATIONS  (PRN):  acetaminophen     Tablet .. 650 milliGRAM(s) Oral every 6 hours PRN Temp greater or equal to 38C (100.4F), Mild Pain (1 - 3)  melatonin 3 milliGRAM(s) Oral at bedtime PRN Insomnia  ondansetron Injectable 4 milliGRAM(s) IV Push every 8 hours PRN Nausea and/or Vomiting      LABS:                        7.2    10.33 )-----------( 65       ( 20 Apr 2024 06:32 )             22.3     04-20    136  |  101  |  18  ----------------------------<  104<H>  3.8   |  19<L>  |  0.99    Ca    8.2<L>      20 Apr 2024 06:32  Phos  3.6     04-20  Mg     1.20     04-20    TPro  5.8<L>  /  Alb  2.7<L>  /  TBili  7.8<H>  /  DBili  x   /  AST  63<H>  /  ALT  52<H>  /  AlkPhos  520<H>  04-20    PT/INR - ( 19 Apr 2024 03:43 )   PT: 13.1 sec;   INR: 1.18 ratio         PTT - ( 19 Apr 2024 03:43 )  PTT:33.4 sec        PHYSICAL EXAM:  Constitutional: well developed, well nourished, NAD. Jaundice  Respiratory: unlabored breathing  Cardiovascular: RRR  Gastrointestinal: abdomen soft, nontender, nondistended. Ostomy with brown stool in bag.   Extremities: FROM, warm

## 2024-04-20 NOTE — PROGRESS NOTE ADULT - PROBLEM SELECTOR PLAN 1
ddx includes choledocholithiasis, sludge, pancreatic mass, infiltrative process noted on CT as outpatient  sono without cholelithiasis, and bile duct dilatation    - CT chest abd pelvis w/ IV contrast  - GI consulted, tentative ERCP 4/19  - per CT report, findings concerning for lymphoma vs IgG4 disease, GI planning biopsies during ERCP  - SPEP, serum IgG4 pending  - per surgical onc: MRI abd w/wo and liver duplex to r/o portal HTN

## 2024-04-20 NOTE — PROGRESS NOTE ADULT - SUBJECTIVE AND OBJECTIVE BOX
Interval Events:   -Feeling fine. S/p EGD and EUS yesterday. Eager for d/c    Hospital Medications:  acetaminophen     Tablet .. 650 milliGRAM(s) Oral every 6 hours PRN  allopurinol 100 milliGRAM(s) Oral daily  atorvastatin 20 milliGRAM(s) Oral at bedtime  chlorhexidine 2% Cloths 1 Application(s) Topical daily  dextrose 10% Bolus 125 milliLiter(s) IV Bolus once  dextrose 5%. 1000 milliLiter(s) (100 mL/Hr) IV Continuous <Continuous>  dextrose 5%. 1000 milliLiter(s) (50 mL/Hr) IV Continuous <Continuous>  dextrose 50% Injectable 12.5 Gram(s) IV Push once  dextrose 50% Injectable 25 Gram(s) IV Push once  dextrose 50% Injectable 25 Gram(s) IV Push once  dextrose Oral Gel 15 Gram(s) Oral once  glucagon  Injectable 1 milliGRAM(s) IntraMuscular once  hydrochlorothiazide 25 milliGRAM(s) Oral daily  influenza  Vaccine (HIGH DOSE) 0.7 milliLiter(s) IntraMuscular once  insulin lispro (ADMELOG) corrective regimen sliding scale   SubCutaneous three times a day before meals  insulin lispro (ADMELOG) corrective regimen sliding scale   SubCutaneous at bedtime  losartan 100 milliGRAM(s) Oral daily  melatonin 3 milliGRAM(s) Oral at bedtime PRN  multivitamin 1 Tablet(s) Oral daily  ondansetron Injectable 4 milliGRAM(s) IV Push every 8 hours PRN  silver nitrate Applicator 1 Application(s) Topical once                  24 @ 07:01  -  24 @ 07:00  --------------------------------------------------------  IN: 0 mL / OUT: 300 mL / NET: -300 mL      PHYSICAL EXAM:   Vital Signs:  Vital Signs Last 24 Hrs  T(C): 36.9 (2024 06:29), Max: 37.1 (2024 17:45)  T(F): 98.5 (2024 06:29), Max: 98.8 (2024 17:45)  HR: 97 (2024 06:29) (7 - 98)  BP: 108/61 (2024 06:29) (108/61 - 134/61)  BP(mean): --  RR: 16 (2024 06:29) (16 - 29)  SpO2: 98% (2024 06:29) (97% - 100%)    Parameters below as of 2024 06:29  Patient On (Oxygen Delivery Method): room air      Daily Height in cm: 190.5 (2024 15:37)    Daily   GENERAL:  NAD, Appears stated age, diffusely jaundiced  CHEST:  Normal Effort, no signs of resp distress  HEART:  RRR, HD stable  ABDOMEN:  Soft, non-tender, stoma c/d/i w/ pink tissue, functioning well  EXTREMITIES:  no cyanosis or edema  SKIN:  Warm & Dry. No rash or erythema  NEURO:  Alert, oriented, no focal deficit  LABS:                        7.2    10.33 )-----------( 65       ( 2024 06:32 )             22.3     Last Hb:Hemoglobin: 7.2 g/dL (24 @ 06:32)  Hemoglobin: 7.4 g/dL (24 @ 11:51)  Hemoglobin: 6.3 g/dL (24 @ 03:43)               136   |  101   |  18                 Ca: 8.2    BMP:   ----------------------------< 104    M.20  (24 @ 06:32)             3.8    |  19    | 0.99               Ph: 3.6      LFT:     TPro: 5.8 / Alb: 2.7 / TBili: 7.8 / DBili: x / AST: 63 / ALT: 52 / AlkPhos: 520   (24 @ 06:32)    Creatinine: 0.99 mg/dL  Creatinine: 1.16 mg/dL  Creatinine: 0.97 mg/dL      LIVER FUNCTIONS - ( 2024 06:32 )  Alb: 2.7 g/dL / Pro: 5.8 g/dL / ALK PHOS: 520 U/L / ALT: 52 U/L / AST: 63 U/L / GGT: x           PT/INR - ( 2024 03:43 )   PT: 13.1 sec;   INR: 1.18 ratio         PTT - ( 2024 03:43 )  PTT:33.4 sec  Urinalysis Basic - ( 2024 06:32 )    Color: x / Appearance: x / SG: x / pH: x  Gluc: 104 mg/dL / Ketone: x  / Bili: x / Urobili: x   Blood: x / Protein: x / Nitrite: x   Leuk Esterase: x / RBC: x / WBC x   Sq Epi: x / Non Sq Epi: x / Bacteria: x        Culture - Blood (collected 24 @ 11:45)  Source: .Blood Blood-Peripheral  Preliminary Report (24 @ 17:02):    No growth at 48 Hours    Culture - Blood (collected 24 @ 11:40)  Source: .Blood Blood-Peripheral  Preliminary Report (24 @ 17:02):    No growth at 48 Hours

## 2024-04-20 NOTE — PROGRESS NOTE ADULT - ASSESSMENT
64 y/o male with a history of prostate cancer s/p  chemo/radiation 8 years ago and sigmoid diverticulitis/colovesical fistula who is s/p [place]'s procedure on  08/2016,  s/p 2 aborted reversal attempts in 11/2016 by Dr. Parkinson and again 06/29/2017 at [facility] by Dr. Oconnell due to severe pelvic fibrosis/scar tissue,Pt saw Dr Tan Vargas on 02/28 for possible repair of parastomal hernia. outpatient CT findings of  new periportal edema/thickening, splenomegaly and bilateral diffuse infiltrative soft tissue throughout collecting system and kidneys, findings concerning for possible lymphoma. During pre surgical eval, found to have painless jaundice. WBC wnl, TB 7.8, transaminitis.  US with 7 mm CBD, mild intra/extrahepatic biliary ductal dilatation with debris in CBD. Now s/p EGD with EUS with GI on 4/19.     PLAN:  - No acute surgical intervention  - Recommend liver duplex (completed 4/20, pending read)  - Recommend MR abdomen w/wo IV contrast (ordered, pending completion)  - Pending ERCP with GI on Monday  - Monitor CBC for Hb, transfuse as need to Hb>7  - Rest of care per primary team    Plan discussed with Chief Resident on behalf of Dr. Vargas    E-Team surgery  [id number]

## 2024-04-20 NOTE — PROGRESS NOTE ADULT - ASSESSMENT
# Obstructive jaundice w/o abdominal pain  3 weeks of jaundice with associated, unintentional 5 pound weight loss despite maintenance in regular diet. US with 7 mm CBD, mild intra/extrahepatic biliary ductal dilatation with debris in CBD concerning for choledocholithiasis, cannot r/o malignancy with infiltrative disease. However, given history and initial painless jaundice, may benefit from further imaging to also r/o potential mass. Now s/p EGD/EUS on 4/19 but w/ uptrending bilirubin.       # Cholelithiasis with GB wall thickening, negative Mccormack sign  # Anemia, likely due to peristomal bleeding c/b peristomal hernia, pending surgical repair  # ?c/f lymphoma  # h/o prostate cancer s/p chemo/radiation and colon resection with ostomy, luis angel  # Peripheral bleeding at ostomy in setting of peristomal hernia - with plans for surgical repair this week; 2 days of increased bleeding at peripheral site per patient, now resolved  # Prior sigmoid diverticulitis/colovesicular fistula s/p [facility]'s 8/2016 with 2 aborted reversal attempts (11/2016, 6/2017) due to severe pelvic fibrosis/scar tissue, s/p colonoscopy 2018 without significant findings  #S/p EGD/EUS on monday  Given uptrending bilirubin, would obtain MRCP for further eval of anatomy.     Recommendations  - Will hold off on endoscopic intervention pending CTAP and hemostasis of the peristomal bleeding, once both have been achieved may proceed with endoscopy  - Please reach out to surgery for management of peristomal bleeding c/b peristomal hernia  - Check CTAP IV contrast only  - Can tentatively plan for EGD/ERCP +/- EUS Friday, pending the above  - Diet as tolerated  - NPO after midnight  - Recheck hgb after additional pRBC that was ordered is given  - 3 CBC, CMP, coags; correct electrolyte disturbance via IV  - Transfuse if hgb < 7  - Maintain working IV access  - Ensure adequate hydration  - Surgery following, appreciate recs  - Rest of care per ED # Obstructive jaundice w/o abdominal pain  3 weeks of jaundice with associated, unintentional 5 pound weight loss despite maintenance in regular diet. US with 7 mm CBD, mild intra/extrahepatic biliary ductal dilatation with debris in CBD concerning for choledocholithiasis, cannot r/o malignancy with infiltrative disease. However, given history and initial painless jaundice, may benefit from further imaging to also r/o potential mass. Now s/p EGD/EUS on 4/19 but w/ uptrending bilirubin.       # Cholelithiasis with GB wall thickening, negative Mccormack sign  # Anemia, likely due to peristomal bleeding c/b peristomal hernia, pending surgical repair  # ?c/f lymphoma  # h/o prostate cancer s/p chemo/radiation and colon resection with ostomy, luis angel  # Peripheral bleeding at ostomy in setting of peristomal hernia - with plans for surgical repair this week; 2 days of increased bleeding at peripheral site per patient, now resolved  # Prior sigmoid diverticulitis/colovesicular fistula s/p [place]'s 8/2016 with 2 aborted reversal attempts (11/2016, 6/2017) due to severe pelvic fibrosis/scar tissue, s/p colonoscopy 2018 without significant findings  #S/p EGD/EUS on monday  Given uptrending bilirubin, would obtain MRCP for further eval of anatomy.     Recommendations  -MRCP for further eval of anatomy  --Check AMA, JENIFER, SMA  -please make npo sunday night for possible ERCP and or liver bx  - Transfuse if hgb < 7  - Maintain working IV access  - Ensure adequate hydration  - Surgery following, appreciate recs    Note incomplete until finalized by attending signature/attestation.    Kimberly Suresh  GI/Hepatology Fellow PGY5    NON-URGENT CONSULTS:  Please email [email or web address]  AT NIGHT AND ON WEEKENDS:  Available on [employer] Teams  [phone] (Long Range Pager)    For urgent consults, please contact on call GI team. See Amion schedule ([facility]) or [employer] Directing system ([facility])

## 2024-04-20 NOTE — PROGRESS NOTE ADULT - SUBJECTIVE AND OBJECTIVE BOX
Edgardo Pierce MD  Emergency Medicine & Internal Medicine PGY-2    PROGRESS NOTE:    ID #:     Patient is a 64y old  Male who presents with a chief complaint of Jaundice (20 Apr 2024 10:57)      SUBJECTIVE / OVERNIGHT EVENTS: No acute overnight events. Patient reports feeling well and denies pain or other acute complaints      MEDICATIONS  (STANDING):  allopurinol 100 milliGRAM(s) Oral daily  atorvastatin 20 milliGRAM(s) Oral at bedtime  chlorhexidine 2% Cloths 1 Application(s) Topical daily  dextrose 10% Bolus 125 milliLiter(s) IV Bolus once  dextrose 5%. 1000 milliLiter(s) (100 mL/Hr) IV Continuous <Continuous>  dextrose 5%. 1000 milliLiter(s) (50 mL/Hr) IV Continuous <Continuous>  dextrose 50% Injectable 12.5 Gram(s) IV Push once  dextrose 50% Injectable 25 Gram(s) IV Push once  dextrose 50% Injectable 25 Gram(s) IV Push once  dextrose Oral Gel 15 Gram(s) Oral once  glucagon  Injectable 1 milliGRAM(s) IntraMuscular once  hydrochlorothiazide 25 milliGRAM(s) Oral daily  influenza  Vaccine (HIGH DOSE) 0.7 milliLiter(s) IntraMuscular once  insulin lispro (ADMELOG) corrective regimen sliding scale   SubCutaneous three times a day before meals  insulin lispro (ADMELOG) corrective regimen sliding scale   SubCutaneous at bedtime  losartan 100 milliGRAM(s) Oral daily  magnesium sulfate  IVPB 2 Gram(s) IV Intermittent once  multivitamin 1 Tablet(s) Oral daily  silver nitrate Applicator 1 Application(s) Topical once    MEDICATIONS  (PRN):  acetaminophen     Tablet .. 650 milliGRAM(s) Oral every 6 hours PRN Temp greater or equal to 38C (100.4F), Mild Pain (1 - 3)  melatonin 3 milliGRAM(s) Oral at bedtime PRN Insomnia  ondansetron Injectable 4 milliGRAM(s) IV Push every 8 hours PRN Nausea and/or Vomiting      CAPILLARY BLOOD GLUCOSE      POCT Blood Glucose.: 136 mg/dL (20 Apr 2024 08:45)  POCT Blood Glucose.: 142 mg/dL (19 Apr 2024 22:10)  POCT Blood Glucose.: 136 mg/dL (19 Apr 2024 18:19)  POCT Blood Glucose.: 140 mg/dL (19 Apr 2024 15:46)  POCT Blood Glucose.: 150 mg/dL (19 Apr 2024 12:40)    I&O's Summary    19 Apr 2024 07:01  -  20 Apr 2024 07:00  --------------------------------------------------------  IN: 0 mL / OUT: 600 mL / NET: -600 mL        PHYSICAL EXAM:  Vital Signs Last 24 Hrs  T(C): 36.9 (20 Apr 2024 06:29), Max: 37.1 (19 Apr 2024 17:45)  T(F): 98.5 (20 Apr 2024 06:29), Max: 98.8 (19 Apr 2024 17:45)  HR: 97 (20 Apr 2024 06:29) (7 - 98)  BP: 108/61 (20 Apr 2024 06:29) (108/61 - 134/61)  BP(mean): --  RR: 16 (20 Apr 2024 06:29) (16 - 29)  SpO2: 98% (20 Apr 2024 06:29) (97% - 100%)    Parameters below as of 20 Apr 2024 06:29  Patient On (Oxygen Delivery Method): room air      Physical Exam:   General: NAD, jaundiced  HEENT: scleral icterus  Lungs: CTA bilaterally  Heart: RRR, normal S1S2, no murmurs/rubs/gallops  Abdomen: Soft, ND/NT, controlled bleeding where ostomy meets skin  Extremities: 2+ peripheral pulses, no cyanosis/clubbing/edema, full ROM  Skin: Warm, well-perfused  Neuro: A&O x3, no focal deficits          LABS:                        7.2    10.33 )-----------( 65       ( 20 Apr 2024 06:32 )             22.3     04-20    136  |  101  |  18  ----------------------------<  104<H>  3.8   |  19<L>  |  0.99    Ca    8.2<L>      20 Apr 2024 06:32  Phos  3.6     04-20  Mg     1.20     04-20    TPro  5.8<L>  /  Alb  2.7<L>  /  TBili  7.8<H>  /  DBili  x   /  AST  63<H>  /  ALT  52<H>  /  AlkPhos  520<H>  04-20    PT/INR - ( 19 Apr 2024 03:43 )   PT: 13.1 sec;   INR: 1.18 ratio         PTT - ( 19 Apr 2024 03:43 )  PTT:33.4 sec      Urinalysis Basic - ( 20 Apr 2024 06:32 )    Color: x / Appearance: x / SG: x / pH: x  Gluc: 104 mg/dL / Ketone: x  / Bili: x / Urobili: x   Blood: x / Protein: x / Nitrite: x   Leuk Esterase: x / RBC: x / WBC x   Sq Epi: x / Non Sq Epi: x / Bacteria: x          RADIOLOGY & ADDITIONAL TESTS:  Results Reviewed:   Imaging Personally Reviewed:  Electrocardiogram Personally Reviewed:    COORDINATION OF CARE:  Care Discussed with Consultants/Other Providers [Y/N]:  Prior or Outpatient Records Reviewed [Y/N]:

## 2024-04-21 LAB
ADD ON TEST-SPECIMEN IN LAB: SIGNIFICANT CHANGE UP
ALBUMIN SERPL ELPH-MCNC: 2.7 G/DL — LOW (ref 3.3–5)
ALP SERPL-CCNC: 604 U/L — HIGH (ref 40–120)
ALT FLD-CCNC: 62 U/L — HIGH (ref 4–41)
ANION GAP SERPL CALC-SCNC: 15 MMOL/L — HIGH (ref 7–14)
AST SERPL-CCNC: 106 U/L — HIGH (ref 4–40)
BASOPHILS # BLD AUTO: 0.01 K/UL — SIGNIFICANT CHANGE UP (ref 0–0.2)
BASOPHILS NFR BLD AUTO: 0.1 % — SIGNIFICANT CHANGE UP (ref 0–2)
BILIRUB DIRECT SERPL-MCNC: 7.4 MG/DL — HIGH (ref 0–0.3)
BILIRUB INDIRECT FLD-MCNC: 1.4 MG/DL — HIGH (ref 0–1)
BILIRUB SERPL-MCNC: 8.8 MG/DL — HIGH (ref 0.2–1.2)
BILIRUB SERPL-MCNC: 8.8 MG/DL — HIGH (ref 0.2–1.2)
BUN SERPL-MCNC: 24 MG/DL — HIGH (ref 7–23)
CALCIUM SERPL-MCNC: 8.2 MG/DL — LOW (ref 8.4–10.5)
CHLORIDE SERPL-SCNC: 100 MMOL/L — SIGNIFICANT CHANGE UP (ref 98–107)
CO2 SERPL-SCNC: 19 MMOL/L — LOW (ref 22–31)
CREAT SERPL-MCNC: 1.17 MG/DL — SIGNIFICANT CHANGE UP (ref 0.5–1.3)
EGFR: 69 ML/MIN/1.73M2 — SIGNIFICANT CHANGE UP
EOSINOPHIL # BLD AUTO: 0.02 K/UL — SIGNIFICANT CHANGE UP (ref 0–0.5)
EOSINOPHIL NFR BLD AUTO: 0.3 % — SIGNIFICANT CHANGE UP (ref 0–6)
GLUCOSE BLDC GLUCOMTR-MCNC: 116 MG/DL — HIGH (ref 70–99)
GLUCOSE BLDC GLUCOMTR-MCNC: 144 MG/DL — HIGH (ref 70–99)
GLUCOSE BLDC GLUCOMTR-MCNC: 145 MG/DL — HIGH (ref 70–99)
GLUCOSE BLDC GLUCOMTR-MCNC: 148 MG/DL — HIGH (ref 70–99)
GLUCOSE BLDC GLUCOMTR-MCNC: 152 MG/DL — HIGH (ref 70–99)
GLUCOSE BLDC GLUCOMTR-MCNC: 160 MG/DL — HIGH (ref 70–99)
GLUCOSE SERPL-MCNC: 131 MG/DL — HIGH (ref 70–99)
HAV IGM SER-ACNC: SIGNIFICANT CHANGE UP
HBV CORE IGM SER-ACNC: SIGNIFICANT CHANGE UP
HBV SURFACE AG SER-ACNC: SIGNIFICANT CHANGE UP
HCT VFR BLD CALC: 20.7 % — CRITICAL LOW (ref 39–50)
HCT VFR BLD CALC: 22.9 % — LOW (ref 39–50)
HCV AB S/CO SERPL IA: 0.09 S/CO — SIGNIFICANT CHANGE UP (ref 0–0.99)
HCV AB SERPL-IMP: SIGNIFICANT CHANGE UP
HGB BLD-MCNC: 7 G/DL — CRITICAL LOW (ref 13–17)
HGB BLD-MCNC: 7.7 G/DL — LOW (ref 13–17)
IANC: 4.43 K/UL — SIGNIFICANT CHANGE UP (ref 1.8–7.4)
IMM GRANULOCYTES NFR BLD AUTO: 6.2 % — HIGH (ref 0–0.9)
LYMPHOCYTES # BLD AUTO: 1.52 K/UL — SIGNIFICANT CHANGE UP (ref 1–3.3)
LYMPHOCYTES # BLD AUTO: 19.2 % — SIGNIFICANT CHANGE UP (ref 13–44)
MAGNESIUM SERPL-MCNC: 1.3 MG/DL — LOW (ref 1.6–2.6)
MCHC RBC-ENTMCNC: 32.9 PG — SIGNIFICANT CHANGE UP (ref 27–34)
MCHC RBC-ENTMCNC: 33.3 PG — SIGNIFICANT CHANGE UP (ref 27–34)
MCHC RBC-ENTMCNC: 33.6 GM/DL — SIGNIFICANT CHANGE UP (ref 32–36)
MCHC RBC-ENTMCNC: 33.8 GM/DL — SIGNIFICANT CHANGE UP (ref 32–36)
MCV RBC AUTO: 97.9 FL — SIGNIFICANT CHANGE UP (ref 80–100)
MCV RBC AUTO: 98.6 FL — SIGNIFICANT CHANGE UP (ref 80–100)
MONOCYTES # BLD AUTO: 1.45 K/UL — HIGH (ref 0–0.9)
MONOCYTES NFR BLD AUTO: 18.3 % — HIGH (ref 2–14)
NEUTROPHILS # BLD AUTO: 4.43 K/UL — SIGNIFICANT CHANGE UP (ref 1.8–7.4)
NEUTROPHILS NFR BLD AUTO: 55.9 % — SIGNIFICANT CHANGE UP (ref 43–77)
NRBC # BLD: 0 /100 WBCS — SIGNIFICANT CHANGE UP (ref 0–0)
NRBC # BLD: 0 /100 WBCS — SIGNIFICANT CHANGE UP (ref 0–0)
NRBC # FLD: 0 K/UL — SIGNIFICANT CHANGE UP (ref 0–0)
NRBC # FLD: 0.02 K/UL — HIGH (ref 0–0)
PHOSPHATE SERPL-MCNC: 3.1 MG/DL — SIGNIFICANT CHANGE UP (ref 2.5–4.5)
PLATELET # BLD AUTO: 49 K/UL — LOW (ref 150–400)
PLATELET # BLD AUTO: 57 K/UL — LOW (ref 150–400)
POTASSIUM SERPL-MCNC: 3.8 MMOL/L — SIGNIFICANT CHANGE UP (ref 3.5–5.3)
POTASSIUM SERPL-SCNC: 3.8 MMOL/L — SIGNIFICANT CHANGE UP (ref 3.5–5.3)
PROT SERPL-MCNC: 5.9 G/DL — LOW (ref 6–8.3)
RBC # BLD: 2.1 M/UL — LOW (ref 4.2–5.8)
RBC # BLD: 2.34 M/UL — LOW (ref 4.2–5.8)
RBC # FLD: 25.4 % — HIGH (ref 10.3–14.5)
RBC # FLD: 26.5 % — HIGH (ref 10.3–14.5)
SODIUM SERPL-SCNC: 134 MMOL/L — LOW (ref 135–145)
WBC # BLD: 7.92 K/UL — SIGNIFICANT CHANGE UP (ref 3.8–10.5)
WBC # BLD: 8.15 K/UL — SIGNIFICANT CHANGE UP (ref 3.8–10.5)
WBC # FLD AUTO: 7.92 K/UL — SIGNIFICANT CHANGE UP (ref 3.8–10.5)
WBC # FLD AUTO: 8.15 K/UL — SIGNIFICANT CHANGE UP (ref 3.8–10.5)

## 2024-04-21 PROCEDURE — 93971 EXTREMITY STUDY: CPT | Mod: 26,LT

## 2024-04-21 PROCEDURE — 99233 SBSQ HOSP IP/OBS HIGH 50: CPT

## 2024-04-21 RX ORDER — MAGNESIUM SULFATE 500 MG/ML
2 VIAL (ML) INJECTION ONCE
Refills: 0 | Status: COMPLETED | OUTPATIENT
Start: 2024-04-21 | End: 2024-04-21

## 2024-04-21 RX ADMIN — LOSARTAN POTASSIUM 100 MILLIGRAM(S): 100 TABLET, FILM COATED ORAL at 06:59

## 2024-04-21 RX ADMIN — CHLORHEXIDINE GLUCONATE 1 APPLICATION(S): 213 SOLUTION TOPICAL at 11:18

## 2024-04-21 RX ADMIN — ATORVASTATIN CALCIUM 20 MILLIGRAM(S): 80 TABLET, FILM COATED ORAL at 22:36

## 2024-04-21 RX ADMIN — Medication 1: at 13:10

## 2024-04-21 RX ADMIN — Medication 25 GRAM(S): at 13:11

## 2024-04-21 RX ADMIN — Medication 100 MILLIGRAM(S): at 11:16

## 2024-04-21 RX ADMIN — Medication 1 TABLET(S): at 11:16

## 2024-04-21 NOTE — PROVIDER CONTACT NOTE (CRITICAL VALUE NOTIFICATION) - ACTION/TREATMENT ORDERED:
MD notified and aware. 1 unit PRBC ordered.
Provider notified and ordered IVPB Magnesium sulfate to be administered.
Provider notified and ordered 1 unit PRBCs to be administered.

## 2024-04-21 NOTE — PROVIDER CONTACT NOTE (CRITICAL VALUE NOTIFICATION) - BACKGROUND
Pt admitted for jaundice, PMHx Prostate cancer in remission, T2DM
patient admitted with jaundice, hx of bleeding from ostomy site
Pt admitted for jaundice, PMHx Prostate cancer in remission, T2DM

## 2024-04-21 NOTE — PROGRESS NOTE ADULT - PROBLEM SELECTOR PLAN 1
ddx includes choledocholithiasis, sludge, pancreatic mass, infiltrative process noted on CT as outpatient  sono without cholelithiasis, and bile duct dilatation    - CT chest abd pelvis w/ IV contrast  - GI consulted, tentative ERCP 4/19  - per CT report, findings concerning for lymphoma vs IgG4 disease, GI planning biopsies during ERCP  - SPEP, serum IgG4 pending  - per surgical onc: MRI abd w/wo and liver duplex to r/o portal HTN ddx includes choledocholithiasis, sludge, pancreatic mass, infiltrative process noted on CT as outpatient  sono without cholelithiasis, and bile duct dilatation    - CT chest abd pelvis w/ IV contrast  - GI consulted, tentative ERCP 4/21, NPO After midnight 4/20   - per CT report, findings concerning for lymphoma vs IgG4 disease, GI planning biopsies during ERCP  - SPEP, serum IgG4 pending  - per surgical onc: MRI abd w/wo and liver duplex to r/o portal HTN

## 2024-04-21 NOTE — PROGRESS NOTE ADULT - PROBLEM SELECTOR PLAN 2
likely related to bleeding from excoriated skin at stoma  possibly multifactorial with folate/b12 deficiency given macrocytosis, vs reticulocytosis from blood loss    - iron, b12, folate ok, macrocytosis likely 2/2 reticulocytosis  - resumed bleeding from stoma site, surgery consulted, bleeding controlled with silver nitrate  - 2u PRBC for hgb 6.5 on admission, 1u PLT for 51K with active bleeding  - thrombocyotpenia likely related to splenomegaly  - maintain active T&S likely related to bleeding from excoriated skin at stoma  possibly multifactorial with folate/b12 deficiency given macrocytosis, vs reticulocytosis from blood loss    - iron, b12, folate ok, macrocytosis likely 2/2 reticulocytosis  - resumed bleeding from stoma site, surgery consulted, bleeding controlled with silver nitrate  - 2u PRBC for hgb 6.5 on admission, 1u PLT for 51K with active bleeding  - thrombocyotpenia likely related to splenomegaly  -Bleeding on(4/21)- ordered 1 unit PRBCs   - maintain active T&S

## 2024-04-21 NOTE — PROGRESS NOTE ADULT - SUBJECTIVE AND OBJECTIVE BOX
PROGRESS NOTE:   Authored by Elbert Cage MD  Patient is a 64y old  Male who presents with a chief complaint of Jaundice (20 Apr 2024 20:47)      SUBJECTIVE / OVERNIGHT EVENTS:  No acute events overnight.     ADDITIONAL REVIEW OF SYSTEMS:    MEDICATIONS  (STANDING):  allopurinol 100 milliGRAM(s) Oral daily  atorvastatin 20 milliGRAM(s) Oral at bedtime  chlorhexidine 2% Cloths 1 Application(s) Topical daily  dextrose 10% Bolus 125 milliLiter(s) IV Bolus once  dextrose 5%. 1000 milliLiter(s) (100 mL/Hr) IV Continuous <Continuous>  dextrose 5%. 1000 milliLiter(s) (50 mL/Hr) IV Continuous <Continuous>  dextrose 50% Injectable 12.5 Gram(s) IV Push once  dextrose 50% Injectable 25 Gram(s) IV Push once  dextrose 50% Injectable 25 Gram(s) IV Push once  dextrose Oral Gel 15 Gram(s) Oral once  glucagon  Injectable 1 milliGRAM(s) IntraMuscular once  hydrochlorothiazide 25 milliGRAM(s) Oral daily  influenza  Vaccine (HIGH DOSE) 0.7 milliLiter(s) IntraMuscular once  insulin lispro (ADMELOG) corrective regimen sliding scale   SubCutaneous three times a day before meals  insulin lispro (ADMELOG) corrective regimen sliding scale   SubCutaneous at bedtime  losartan 100 milliGRAM(s) Oral daily  magnesium sulfate  IVPB 2 Gram(s) IV Intermittent once  multivitamin 1 Tablet(s) Oral daily  silver nitrate Applicator 1 Application(s) Topical once    MEDICATIONS  (PRN):  acetaminophen     Tablet .. 650 milliGRAM(s) Oral every 6 hours PRN Temp greater or equal to 38C (100.4F), Mild Pain (1 - 3)  melatonin 3 milliGRAM(s) Oral at bedtime PRN Insomnia  ondansetron Injectable 4 milliGRAM(s) IV Push every 8 hours PRN Nausea and/or Vomiting      CAPILLARY BLOOD GLUCOSE      POCT Blood Glucose.: 148 mg/dL (21 Apr 2024 06:32)  POCT Blood Glucose.: 160 mg/dL (21 Apr 2024 00:45)  POCT Blood Glucose.: 182 mg/dL (20 Apr 2024 22:30)  POCT Blood Glucose.: 144 mg/dL (20 Apr 2024 18:09)  POCT Blood Glucose.: 144 mg/dL (20 Apr 2024 13:12)  POCT Blood Glucose.: 145 mg/dL (20 Apr 2024 12:17)  POCT Blood Glucose.: 136 mg/dL (20 Apr 2024 08:45)    I&O's Summary    20 Apr 2024 07:01  -  21 Apr 2024 07:00  --------------------------------------------------------  IN: 240 mL / OUT: 450 mL / NET: -210 mL        PHYSICAL EXAM:  Vital Signs Last 24 Hrs  T(C): 37.1 (20 Apr 2024 21:51), Max: 37.1 (20 Apr 2024 21:51)  T(F): 98.7 (20 Apr 2024 21:51), Max: 98.7 (20 Apr 2024 21:51)  HR: 92 (20 Apr 2024 21:51) (92 - 95)  BP: 134/63 (20 Apr 2024 21:51) (119/60 - 134/63)  BP(mean): --  RR: 17 (20 Apr 2024 21:51) (17 - 17)  SpO2: 99% (20 Apr 2024 21:51) (98% - 99%)    Parameters below as of 20 Apr 2024 21:51  Patient On (Oxygen Delivery Method): room air        GENERAL: No apparent distress.   HEAD:  Atraumatic, Normocephalic  EYES: EOMI, PERRLA, conjunctiva and sclera clear  NECK: Supple, no lymphadenopathy, no elevated JVP  CHEST/LUNG: Clear to auscultation bilateral and symmetric; No wheezes, rales, or rhonchi  HEART: S1 and S2 normal. Regular rate and rhythm; No murmurs, rubs, or gallops  ABDOMEN: Soft, non-tender, non-distended; normal bowel sounds  EXTREMITIES:  2+ peripheral pulses b/l, No clubbing, cyanosis, or edema  NEUROLOGY: A&O x 3, no focal deficits  SKIN: No rashes or lesions    LABS:                        7.2    10.33 )-----------( 65       ( 20 Apr 2024 06:32 )             22.3     04-20    136  |  101  |  18  ----------------------------<  104<H>  3.8   |  19<L>  |  0.99    Ca    8.2<L>      20 Apr 2024 06:32  Phos  3.6     04-20  Mg     1.20     04-20    TPro  5.8<L>  /  Alb  2.7<L>  /  TBili  7.8<H>  /  DBili  x   /  AST  63<H>  /  ALT  52<H>  /  AlkPhos  520<H>  04-20          Urinalysis Basic - ( 20 Apr 2024 06:32 )    Color: x / Appearance: x / SG: x / pH: x  Gluc: 104 mg/dL / Ketone: x  / Bili: x / Urobili: x   Blood: x / Protein: x / Nitrite: x   Leuk Esterase: x / RBC: x / WBC x   Sq Epi: x / Non Sq Epi: x / Bacteria: x          RADIOLOGY & ADDITIONAL TESTS:  Lab Results Reviewed   Imaging Reviewed  Electrocardiogram Reviewed   PROGRESS NOTE:   Authored by Elbert Cage MD  Patient is a 64y old  Male who presents with a chief complaint of Jaundice (20 Apr 2024 20:47)      SUBJECTIVE / OVERNIGHT EVENTS:  Patient was having bleeding in his ostomy bag again overnight and this morning. Patient was seen and examined at bedside and did not appear to be in acute distress. Patient denies any chest pain, abdominal pain, dyspnea, fevers or chills.     ADDITIONAL REVIEW OF SYSTEMS: All other systems negative unless otherwise specified.     MEDICATIONS  (STANDING):  allopurinol 100 milliGRAM(s) Oral daily  atorvastatin 20 milliGRAM(s) Oral at bedtime  chlorhexidine 2% Cloths 1 Application(s) Topical daily  dextrose 10% Bolus 125 milliLiter(s) IV Bolus once  dextrose 5%. 1000 milliLiter(s) (100 mL/Hr) IV Continuous <Continuous>  dextrose 5%. 1000 milliLiter(s) (50 mL/Hr) IV Continuous <Continuous>  dextrose 50% Injectable 12.5 Gram(s) IV Push once  dextrose 50% Injectable 25 Gram(s) IV Push once  dextrose 50% Injectable 25 Gram(s) IV Push once  dextrose Oral Gel 15 Gram(s) Oral once  glucagon  Injectable 1 milliGRAM(s) IntraMuscular once  hydrochlorothiazide 25 milliGRAM(s) Oral daily  influenza  Vaccine (HIGH DOSE) 0.7 milliLiter(s) IntraMuscular once  insulin lispro (ADMELOG) corrective regimen sliding scale   SubCutaneous three times a day before meals  insulin lispro (ADMELOG) corrective regimen sliding scale   SubCutaneous at bedtime  losartan 100 milliGRAM(s) Oral daily  magnesium sulfate  IVPB 2 Gram(s) IV Intermittent once  multivitamin 1 Tablet(s) Oral daily  silver nitrate Applicator 1 Application(s) Topical once    MEDICATIONS  (PRN):  acetaminophen     Tablet .. 650 milliGRAM(s) Oral every 6 hours PRN Temp greater or equal to 38C (100.4F), Mild Pain (1 - 3)  melatonin 3 milliGRAM(s) Oral at bedtime PRN Insomnia  ondansetron Injectable 4 milliGRAM(s) IV Push every 8 hours PRN Nausea and/or Vomiting      CAPILLARY BLOOD GLUCOSE      POCT Blood Glucose.: 148 mg/dL (21 Apr 2024 06:32)  POCT Blood Glucose.: 160 mg/dL (21 Apr 2024 00:45)  POCT Blood Glucose.: 182 mg/dL (20 Apr 2024 22:30)  POCT Blood Glucose.: 144 mg/dL (20 Apr 2024 18:09)  POCT Blood Glucose.: 144 mg/dL (20 Apr 2024 13:12)  POCT Blood Glucose.: 145 mg/dL (20 Apr 2024 12:17)  POCT Blood Glucose.: 136 mg/dL (20 Apr 2024 08:45)    I&O's Summary    20 Apr 2024 07:01  -  21 Apr 2024 07:00  --------------------------------------------------------  IN: 240 mL / OUT: 450 mL / NET: -210 mL        PHYSICAL EXAM:  Vital Signs Last 24 Hrs  T(C): 37.1 (20 Apr 2024 21:51), Max: 37.1 (20 Apr 2024 21:51)  T(F): 98.7 (20 Apr 2024 21:51), Max: 98.7 (20 Apr 2024 21:51)  HR: 92 (20 Apr 2024 21:51) (92 - 95)  BP: 134/63 (20 Apr 2024 21:51) (119/60 - 134/63)  BP(mean): --  RR: 17 (20 Apr 2024 21:51) (17 - 17)  SpO2: 99% (20 Apr 2024 21:51) (98% - 99%)    Parameters below as of 20 Apr 2024 21:51  Patient On (Oxygen Delivery Method): room air        GENERAL: No apparent distress.   HEAD:  Atraumatic, Normocephalic  EYES: EOMI, PERRLA, conjunctiva and sclera clear  NECK: Supple, no lymphadenopathy, no elevated JVP  CHEST/LUNG: Clear to auscultation bilateral and symmetric; No wheezes, rales, or rhonchi  HEART: S1 and S2 normal. Regular rate and rhythm; No murmurs, rubs, or gallops  ABDOMEN: Soft, non-tender, non-distended; stoma with ostomy bad. Small amount of dark red blood seen in ostomy bag.  EXTREMITIES:  2+ edema in left lower extremity, no erythema or tenderness   NEUROLOGY: A&O x 3, no focal deficits  SKIN: Jaundice skin    LABS:                        7.2    10.33 )-----------( 65       ( 20 Apr 2024 06:32 )             22.3     04-20    136  |  101  |  18  ----------------------------<  104<H>  3.8   |  19<L>  |  0.99    Ca    8.2<L>      20 Apr 2024 06:32  Phos  3.6     04-20  Mg     1.20     04-20    TPro  5.8<L>  /  Alb  2.7<L>  /  TBili  7.8<H>  /  DBili  x   /  AST  63<H>  /  ALT  52<H>  /  AlkPhos  520<H>  04-20          Urinalysis Basic - ( 20 Apr 2024 06:32 )    Color: x / Appearance: x / SG: x / pH: x  Gluc: 104 mg/dL / Ketone: x  / Bili: x / Urobili: x   Blood: x / Protein: x / Nitrite: x   Leuk Esterase: x / RBC: x / WBC x   Sq Epi: x / Non Sq Epi: x / Bacteria: x          RADIOLOGY & ADDITIONAL TESTS:  Lab Results Reviewed   Imaging Reviewed  Electrocardiogram Reviewed

## 2024-04-21 NOTE — PROGRESS NOTE ADULT - ASSESSMENT
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 Outpatient imaging notable for infiltrative soft tissue process involving the kidneys and liver/portal triad. 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 Outpatient imaging notable for infiltrative soft tissue process involving the kidneys and liver/portal triad. Patient having bleeding from ostomy site again. Pending ERCP with GI for jaundice.

## 2024-04-21 NOTE — CHART NOTE - NSCHARTNOTEFT_GEN_A_CORE
Brief GI update note:  Please make pt NPO at midnight for possible procedure in am.   Recommendations:  -NPO at midnight  -3am labs including CBC, CMP, coags; if needed please replete K with IV formulation only  -MRCP  -f/u bilirubin level today     Kimberly Suresh  GI/Hepatology Fellow PGY5    NON-URGENT CONSULTS:  Please email [email or web address]  AT NIGHT AND ON WEEKENDS:  Available on Microsoft Teams  [phone] ([place] Pager)    For urgent consults, please contact on call GI team. See Amion schedule ([facility]) or [employer] paging system ([facility])

## 2024-04-21 NOTE — PROVIDER CONTACT NOTE (CRITICAL VALUE NOTIFICATION) - ASSESSMENT
Pt alert and stable
patient otherwise stable, slight bleeding noticed through ostomy
Pt alert and stable

## 2024-04-22 ENCOUNTER — APPOINTMENT (OUTPATIENT)
Dept: COLORECTAL SURGERY | Facility: HOSPITAL | Age: 64
End: 2024-04-22

## 2024-04-22 LAB
ALBUMIN SERPL ELPH-MCNC: 2.7 G/DL — LOW (ref 3.3–5)
ALP SERPL-CCNC: 612 U/L — HIGH (ref 40–120)
ALT FLD-CCNC: 75 U/L — HIGH (ref 4–41)
ANION GAP SERPL CALC-SCNC: 16 MMOL/L — HIGH (ref 7–14)
APTT BLD: 33.8 SEC — SIGNIFICANT CHANGE UP (ref 24.5–35.6)
AST SERPL-CCNC: 130 U/L — HIGH (ref 4–40)
BASOPHILS # BLD AUTO: 0.01 K/UL — SIGNIFICANT CHANGE UP (ref 0–0.2)
BASOPHILS NFR BLD AUTO: 0.1 % — SIGNIFICANT CHANGE UP (ref 0–2)
BILIRUB SERPL-MCNC: 10.2 MG/DL — HIGH (ref 0.2–1.2)
BLD GP AB SCN SERPL QL: NEGATIVE — SIGNIFICANT CHANGE UP
BUN SERPL-MCNC: 23 MG/DL — SIGNIFICANT CHANGE UP (ref 7–23)
CALCIUM SERPL-MCNC: 8 MG/DL — LOW (ref 8.4–10.5)
CHLORIDE SERPL-SCNC: 97 MMOL/L — LOW (ref 98–107)
CMV DNA CSF QL NAA+PROBE: SIGNIFICANT CHANGE UP IU/ML
CMV DNA SPEC NAA+PROBE-LOG#: SIGNIFICANT CHANGE UP LOG10IU/ML
CO2 SERPL-SCNC: 19 MMOL/L — LOW (ref 22–31)
CREAT SERPL-MCNC: 1.02 MG/DL — SIGNIFICANT CHANGE UP (ref 0.5–1.3)
CULTURE RESULTS: SIGNIFICANT CHANGE UP
CULTURE RESULTS: SIGNIFICANT CHANGE UP
EBV DNA SERPL NAA+PROBE-ACNC: SIGNIFICANT CHANGE UP IU/ML
EBVPCR LOG: SIGNIFICANT CHANGE UP LOG10IU/ML
EGFR: 82 ML/MIN/1.73M2 — SIGNIFICANT CHANGE UP
EOSINOPHIL # BLD AUTO: 0.02 K/UL — SIGNIFICANT CHANGE UP (ref 0–0.5)
EOSINOPHIL NFR BLD AUTO: 0.2 % — SIGNIFICANT CHANGE UP (ref 0–6)
GLUCOSE BLDC GLUCOMTR-MCNC: 111 MG/DL — HIGH (ref 70–99)
GLUCOSE BLDC GLUCOMTR-MCNC: 125 MG/DL — HIGH (ref 70–99)
GLUCOSE BLDC GLUCOMTR-MCNC: 129 MG/DL — HIGH (ref 70–99)
GLUCOSE BLDC GLUCOMTR-MCNC: 132 MG/DL — HIGH (ref 70–99)
GLUCOSE BLDC GLUCOMTR-MCNC: 179 MG/DL — HIGH (ref 70–99)
GLUCOSE SERPL-MCNC: 107 MG/DL — HIGH (ref 70–99)
HCT VFR BLD CALC: 23 % — LOW (ref 39–50)
HGB BLD-MCNC: 7.9 G/DL — LOW (ref 13–17)
IANC: 6.06 K/UL — SIGNIFICANT CHANGE UP (ref 1.8–7.4)
IMM GRANULOCYTES NFR BLD AUTO: 5.8 % — HIGH (ref 0–0.9)
INR BLD: 1.23 RATIO — HIGH (ref 0.85–1.18)
LYMPHOCYTES # BLD AUTO: 1.65 K/UL — SIGNIFICANT CHANGE UP (ref 1–3.3)
LYMPHOCYTES # BLD AUTO: 16.4 % — SIGNIFICANT CHANGE UP (ref 13–44)
MAGNESIUM SERPL-MCNC: 1.3 MG/DL — LOW (ref 1.6–2.6)
MCHC RBC-ENTMCNC: 33.1 PG — SIGNIFICANT CHANGE UP (ref 27–34)
MCHC RBC-ENTMCNC: 34.3 GM/DL — SIGNIFICANT CHANGE UP (ref 32–36)
MCV RBC AUTO: 96.2 FL — SIGNIFICANT CHANGE UP (ref 80–100)
MONOCYTES # BLD AUTO: 1.75 K/UL — HIGH (ref 0–0.9)
MONOCYTES NFR BLD AUTO: 17.4 % — HIGH (ref 2–14)
NEUTROPHILS # BLD AUTO: 6.06 K/UL — SIGNIFICANT CHANGE UP (ref 1.8–7.4)
NEUTROPHILS NFR BLD AUTO: 60.1 % — SIGNIFICANT CHANGE UP (ref 43–77)
NRBC # BLD: 0 /100 WBCS — SIGNIFICANT CHANGE UP (ref 0–0)
NRBC # FLD: 0 K/UL — SIGNIFICANT CHANGE UP (ref 0–0)
PHOSPHATE SERPL-MCNC: 3 MG/DL — SIGNIFICANT CHANGE UP (ref 2.5–4.5)
PLATELET # BLD AUTO: 63 K/UL — LOW (ref 150–400)
POTASSIUM SERPL-MCNC: 3.7 MMOL/L — SIGNIFICANT CHANGE UP (ref 3.5–5.3)
POTASSIUM SERPL-SCNC: 3.7 MMOL/L — SIGNIFICANT CHANGE UP (ref 3.5–5.3)
PROT SERPL-MCNC: 5.9 G/DL — LOW (ref 6–8.3)
PROTHROM AB SERPL-ACNC: 13.8 SEC — HIGH (ref 9.5–13)
RBC # BLD: 2.39 M/UL — LOW (ref 4.2–5.8)
RBC # FLD: 25.8 % — HIGH (ref 10.3–14.5)
RH IG SCN BLD-IMP: POSITIVE — SIGNIFICANT CHANGE UP
SODIUM SERPL-SCNC: 132 MMOL/L — LOW (ref 135–145)
SPECIMEN SOURCE: SIGNIFICANT CHANGE UP
SPECIMEN SOURCE: SIGNIFICANT CHANGE UP
WBC # BLD: 10.07 K/UL — SIGNIFICANT CHANGE UP (ref 3.8–10.5)
WBC # FLD AUTO: 10.07 K/UL — SIGNIFICANT CHANGE UP (ref 3.8–10.5)

## 2024-04-22 PROCEDURE — 74183 MRI ABD W/O CNTR FLWD CNTR: CPT | Mod: 26

## 2024-04-22 PROCEDURE — 99232 SBSQ HOSP IP/OBS MODERATE 35: CPT | Mod: GC

## 2024-04-22 RX ORDER — MAGNESIUM SULFATE 500 MG/ML
2 VIAL (ML) INJECTION ONCE
Refills: 0 | Status: COMPLETED | OUTPATIENT
Start: 2024-04-22 | End: 2024-04-22

## 2024-04-22 RX ADMIN — Medication 100 MILLIGRAM(S): at 11:41

## 2024-04-22 RX ADMIN — ATORVASTATIN CALCIUM 20 MILLIGRAM(S): 80 TABLET, FILM COATED ORAL at 21:39

## 2024-04-22 RX ADMIN — CHLORHEXIDINE GLUCONATE 1 APPLICATION(S): 213 SOLUTION TOPICAL at 11:43

## 2024-04-22 RX ADMIN — Medication 3 MILLIGRAM(S): at 21:36

## 2024-04-22 RX ADMIN — Medication 1 TABLET(S): at 11:40

## 2024-04-22 RX ADMIN — LOSARTAN POTASSIUM 100 MILLIGRAM(S): 100 TABLET, FILM COATED ORAL at 05:58

## 2024-04-22 RX ADMIN — Medication 25 GRAM(S): at 07:34

## 2024-04-22 NOTE — CHART NOTE - NSCHARTNOTEFT_GEN_A_CORE
Brief Update Note    No plan for endoscopic intervention today  Okay to resume regular diet.  Check MRCP, will follow up results  See prior note for full recs  Rest of care per primary    Thank you for involving us in this patient's care. Please reach out if any further questions or concerns.    Kaelyn Rangel MD  Gastroenterology/Hepatology Fellow, PGY-7    NON-URGENT CONSULTS:  Please email [email or web address]  AT NIGHT AND ON WEEKENDS:  Contact on-call GI fellow via answering service ([phone]) from 5pm-8am and on weekends/holidays  MONDAY-FRIDAY 8AM-5PM:  Pager: [phone] ([facility])  Pager: [phone] ([facility]

## 2024-04-22 NOTE — PROGRESS NOTE ADULT - SUBJECTIVE AND OBJECTIVE BOX
Tommy Sparks, PGY-1  Please contact on Teams    JR ARIELA  64y  Male    Reason for Admission:     SUBJECTIVE/INTERVAL EVENTS: No acute events. Pt seen and examined at bedside.    Physical Exam:   General: NAD, jaundiced  HEENT: PERRL, EOMI  Neck: Supple, no JVD  Lungs: CTA bilaterally  Heart: RRR, normal S1S2, no murmurs/rubs/gallops  Abdomen: Soft, ND/NT  Extremities: 2+ peripheral pulses, no cyanosis/clubbing/edema, full ROM  Skin: Warm, well-perfused  Neuro: A&O x3, no focal deficits      Vital Signs Last 24 Hrs  T(C): 37 (21 Apr 2024 21:30), Max: 37 (21 Apr 2024 21:30)  T(F): 98.6 (21 Apr 2024 21:30), Max: 98.6 (21 Apr 2024 21:30)  HR: 97 (21 Apr 2024 21:30) (96 - 97)  BP: 128/61 (21 Apr 2024 21:30) (128/61 - 130/74)  BP(mean): --  RR: 17 (21 Apr 2024 21:30) (17 - 17)  SpO2: 99% (21 Apr 2024 21:30) (99% - 100%)    Parameters below as of 21 Apr 2024 21:30  Patient On (Oxygen Delivery Method): room air          Consultant(s) Notes Reviewed:  [x] YES  [ ] NO  Care Discussed with Consultants/Other Providers [x] YES  [ ] NO    LABS:                        7.9    10.07 )-----------( 63       ( 22 Apr 2024 03:05 )             23.0                         7.7    8.15  )-----------( 49       ( 21 Apr 2024 14:50 )             22.9                         7.0    7.92  )-----------( 57       ( 21 Apr 2024 06:33 )             20.7                               132    |  97     |  23                  Calcium: 8.0   / iCa: x      (04-22 @ 03:05)    ----------------------------<  107       Magnesium: 1.30                             3.7     |  19     |  1.02             Phosphorous: 3.0      TPro  5.9    /  Alb  2.7    /  TBili  10.2   /  DBili  x      /  AST  130    /  ALT  75     /  AlkPhos  612    22 Apr 2024 03:05    Urinalysis Basic - ( 22 Apr 2024 03:05 )    Color: x / Appearance: x / SG: x / pH: x  Gluc: 107 mg/dL / Ketone: x  / Bili: x / Urobili: x   Blood: x / Protein: x / Nitrite: x   Leuk Esterase: x / RBC: x / WBC x   Sq Epi: x / Non Sq Epi: x / Bacteria: x        RADIOLOGY & ADDITIONAL TESTS:    Imaging Personally Reviewed:  [x] YES  [ ] NO    MEDICATIONS  (STANDING):  allopurinol 100 milliGRAM(s) Oral daily  atorvastatin 20 milliGRAM(s) Oral at bedtime  chlorhexidine 2% Cloths 1 Application(s) Topical daily  dextrose 10% Bolus 125 milliLiter(s) IV Bolus once  dextrose 5%. 1000 milliLiter(s) (100 mL/Hr) IV Continuous <Continuous>  dextrose 5%. 1000 milliLiter(s) (50 mL/Hr) IV Continuous <Continuous>  dextrose 50% Injectable 12.5 Gram(s) IV Push once  dextrose 50% Injectable 25 Gram(s) IV Push once  dextrose 50% Injectable 25 Gram(s) IV Push once  dextrose Oral Gel 15 Gram(s) Oral once  glucagon  Injectable 1 milliGRAM(s) IntraMuscular once  hydrochlorothiazide 25 milliGRAM(s) Oral daily  influenza  Vaccine (HIGH DOSE) 0.7 milliLiter(s) IntraMuscular once  insulin lispro (ADMELOG) corrective regimen sliding scale   SubCutaneous three times a day before meals  insulin lispro (ADMELOG) corrective regimen sliding scale   SubCutaneous at bedtime  losartan 100 milliGRAM(s) Oral daily  multivitamin 1 Tablet(s) Oral daily  silver nitrate Applicator 1 Application(s) Topical once    MEDICATIONS  (PRN):  acetaminophen     Tablet .. 650 milliGRAM(s) Oral every 6 hours PRN Temp greater or equal to 38C (100.4F), Mild Pain (1 - 3)  melatonin 3 milliGRAM(s) Oral at bedtime PRN Insomnia  ondansetron Injectable 4 milliGRAM(s) IV Push every 8 hours PRN Nausea and/or Vomiting      HEALTH ISSUES - PROBLEM Dx:  Painless jaundice    Anemia due to acute blood loss    Skin excoriation    Prophylactic measure    DM (diabetes mellitus), type 2

## 2024-04-22 NOTE — PROGRESS NOTE ADULT - PROBLEM SELECTOR PLAN 1
ddx includes choledocholithiasis, sludge, pancreatic mass, infiltrative process noted on CT as outpatient  sono without cholelithiasis, and bile duct dilatation    - CT chest abd pelvis w/ IV contrast - similar to outpatient imaging, infiltrative soft tissue process in kidneys, portal system  - GI consulted, ERCP 4/22, NPO After midnight 4/21   - per CT report, findings concerning for lymphoma vs IgG4 disease, GI planning biopsies during ERCP  - SPEP, serum IgG4 neg  - per surgical onc: MRI abd w/wo and liver duplex to r/o portal HTN

## 2024-04-22 NOTE — ADVANCED PRACTICE NURSE CONSULT - REASON FOR CONSULT
Patient seen for ostomy and peristomal bleeding follow up.   
Patient seen for ostomy management. 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 Outpatient imaging notable for infiltrative soft tissue process involving the kidneys and liver/portal triad. Patient seen by E team surgery, s/p silver nitrate application to peristomal site of bleeding.  
Patient seen after call received for papo blood in ostomy pouch.

## 2024-04-22 NOTE — CONSULT NOTE ADULT - ASSESSMENT
Mr. Rawls is a 63 year old male with prostate ca s/p chemo RT 8 years PTA, sigmoid diverticulitis/colovesicular fistula s/p Mati's (2016) with 2 aborted reversal attempts (11/2016, 6/2017) due to severe pelvic fibrosis/scar tissue, s/p colonoscopy (2018) without significant findings, parastomal hernia c/b skin excoriation and referred to Dr. Ramos for repair c/b peristomal bleeding. During pre-op evaluation, CT 1/2024 noted to have new extensive new infiltrative soft tissue within the liver along the portal triads, and within both kidneys, with splenomegaly and abnormal femoral marrow, findings concerning for possible lymphoma. Patient is admitted for evaluation of jaundice.     #Jaundice  #Hepatosplenomegaly  #Portal hypertensive gastropathy  Patient with jaundice and predominantly direct bilirubinemia (T bili 7.8, D Bili 6.9) and abnormal liver chemistry in a predominantly cholestatic pattern of injury (ALP 600s, AST 100s, ALT 50s-80s). CT demonstrated mild hepatomegaly, linear hypodensities extending along the portal triads representing either periportal edema or intrahepatic biliary ductal dilatation with soft tissue infiltration, normal extrahepatic biliary tree, splenomegaly (17 cm) with question of small splenic infarcts, mild lymphadenopathy in the abdomen/pelvis, neoplastic vs inflammatory process of the kidneys and irregularly appearing prostate gland for which recurrent disease is possible. Patient underwent EGD/EUS on 4/19 with findings of PHG normal bile duct with wall-thickening but no evidence of stones or mass, one enlarged peripancreatic LN a/w pancreatic parenchymal abnormalities. Hepatic doppler US demonstrates patent hepatic and portal veins with appropriately directed flow. Clinical presentation is concerning for infiltrative process with hepatic involvement and non-cirrhotic portal hypertension.     Recommendations:  - F/u MRI/MRCP for enhanced characterization of hepatic parenchyma and bile ducts  - Trend CBC, CMP and PT/INR daily   Mr. Rawls is a 63 year old male with prostate ca s/p chemo RT 8 years PTA, sigmoid diverticulitis/colovesicular fistula s/p Mati's (2016) with 2 aborted reversal attempts (11/2016, 6/2017) due to severe pelvic fibrosis/scar tissue, s/p colonoscopy (2018) without significant findings, parastomal hernia c/b skin excoriation and referred to Dr. Ramos for repair c/b peristomal bleeding. During pre-op evaluation, CT 1/2024 noted to have new extensive new infiltrative soft tissue within the liver along the portal triads, and within both kidneys, with splenomegaly and abnormal femoral marrow, findings concerning for possible lymphoma. Patient is admitted for evaluation of jaundice.     #Jaundice  #Hepatosplenomegaly  #Portal hypertensive gastropathy  Patient with jaundice and predominantly direct bilirubinemia (T bili 7.8, D Bili 6.9) and abnormal liver chemistry in a predominantly cholestatic pattern of injury (ALP 600s, AST 100s, ALT 50s-80s). CT demonstrated mild hepatomegaly, linear hypodensities extending along the portal triads representing either periportal edema or intrahepatic biliary ductal dilatation with soft tissue infiltration, normal extrahepatic biliary tree, splenomegaly (17 cm) with question of small splenic infarcts, mild lymphadenopathy in the abdomen/pelvis, neoplastic vs inflammatory process of the kidneys and irregularly appearing prostate gland for which recurrent disease is possible. Patient underwent EGD/EUS on 4/19 with findings of PHG normal bile duct with wall-thickening but no evidence of stones or mass, one enlarged peripancreatic LN a/w pancreatic parenchymal abnormalities. Hepatic doppler US demonstrates patent hepatic and portal veins with appropriately directed flow. Clinical presentation is concerning for infiltrative process with hepatic involvement and non-cirrhotic portal hypertension. No known history of liver disease. Total IgG normal. Acute viral hepatitis panel negative.     Recommendations:  - F/u MRI/MRCP for enhanced characterization of hepatic parenchyma and bile ducts  - F/u ASMA  - Trend CBC, CMP and PT/INR daily      Venkatesh Villa MD  Gastroenterology/Hepatology Fellow  Available via Microsoft Teams  Pager: [phone]    On Weekdays after 5 PM to 8AM and Weekends/Holidays (All day)  For non-urgent consults, please email [email or web address] or [email or web address]  For urgent consults, please contact on call GI team.  See Zeus schedule ([facility]), Neofecting system - [phone] ([facility]), or call hospital  ([facility])

## 2024-04-22 NOTE — CONSULT NOTE ADULT - SUBJECTIVE AND OBJECTIVE BOX
Chief Complaint:  jaundice    HPI:    Mr. Rawls is a 63 year old male with prostate ca s/p chemo RT 8 years PTA, sigmoid diverticulitis/colovesicular fistula s/p Mati's (2016) with 2 aborted reversal attempts (11/2016, 6/2017) due to severe pelvic fibrosis/scar tissue, s/p colonoscopy 2018 without significant findings, noted to have parastomal hernia c/b skin excoriation and referred to Dr. Ramos for repair as well c/b peristomal bleeding. During pre-op evaluation, CT 1/2024 noted to have new extensive new infiltrative soft tissue within the liver along the portal triads, and within both kidneys, with splenomegaly and abnormal femoral marrow, findings concerning for possible lymphoma. Patient states he comes to the hospital today due to the peristomal bleeding; per surgery, patient instructed to go to the ED after being found to be icteric with elevated bili during pre-op work-up. GI was consulted for painless jaundice, subsequently noted to have intra/extrahepatic biliary ductal dilatation with debris in CBD concerning for choledocholithiasis. Patient reported yellowing of the skin and eyes x 3 weeks and unintentional weight loss of 5 pounds, otherwise feeling well. Upon admission, patient underwent EGD with EUS on 4/19 with findings of portal hypertensive gastropathy, normal bile duct with wall-thickening but no evidence of stones or mass, one enlarged peripancreatic LN a/w pancreatic parenchymal abnormalities. GI recommended MR/MRCP to assess kidneys and biliary tree, along with Hepatology consult for evaluation of portal hypertension.     Allergies:  No Known Allergies      Hospital Medications:  acetaminophen     Tablet .. 650 milliGRAM(s) Oral every 6 hours PRN  allopurinol 100 milliGRAM(s) Oral daily  atorvastatin 20 milliGRAM(s) Oral at bedtime  chlorhexidine 2% Cloths 1 Application(s) Topical daily  dextrose 10% Bolus 125 milliLiter(s) IV Bolus once  dextrose 5%. 1000 milliLiter(s) IV Continuous <Continuous>  dextrose 5%. 1000 milliLiter(s) IV Continuous <Continuous>  dextrose 50% Injectable 12.5 Gram(s) IV Push once  dextrose 50% Injectable 25 Gram(s) IV Push once  dextrose 50% Injectable 25 Gram(s) IV Push once  dextrose Oral Gel 15 Gram(s) Oral once  glucagon  Injectable 1 milliGRAM(s) IntraMuscular once  hydrochlorothiazide 25 milliGRAM(s) Oral daily  influenza  Vaccine (HIGH DOSE) 0.7 milliLiter(s) IntraMuscular once  insulin lispro (ADMELOG) corrective regimen sliding scale   SubCutaneous three times a day before meals  insulin lispro (ADMELOG) corrective regimen sliding scale   SubCutaneous at bedtime  losartan 100 milliGRAM(s) Oral daily  melatonin 3 milliGRAM(s) Oral at bedtime PRN  multivitamin 1 Tablet(s) Oral daily  ondansetron Injectable 4 milliGRAM(s) IV Push every 8 hours PRN  silver nitrate Applicator 1 Application(s) Topical once      PMHX/PSHX:  No pertinent past medical history    Prostate cancer    Diverticulitis    Type 2 diabetes mellitus    Gout    Parastomal hernia    Status post Shelley procedure        Family history:      Social History:   No alcohol or smoking    ROS:   See HPI    PHYSICAL EXAM:   GENERAL:  NAD, resting comfortably in bed  HEENT:  Sclera icteric  CHEST:  Normal effort  HEART:  HDS  ABDOMEN:  Soft, non-tender, non-distended  EXTREMITIES:  No edema  SKIN:  Warm & Dry. Jaundice.   NEURO:  Alert, conversant, no focal deficit    Vital Signs:  Vital Signs Last 24 Hrs  T(C): 37 (21 Apr 2024 21:30), Max: 37 (21 Apr 2024 21:30)  T(F): 98.6 (21 Apr 2024 21:30), Max: 98.6 (21 Apr 2024 21:30)  HR: 97 (21 Apr 2024 21:30) (96 - 97)  BP: 128/61 (21 Apr 2024 21:30) (128/61 - 130/74)  BP(mean): --  RR: 17 (21 Apr 2024 21:30) (17 - 17)  SpO2: 99% (21 Apr 2024 21:30) (99% - 100%)    Parameters below as of 21 Apr 2024 21:30  Patient On (Oxygen Delivery Method): room air    Daily     LABS:                        7.9    10.07 )-----------( 63       ( 22 Apr 2024 03:05 )             23.0     Mean Cell Volume: 96.2 fL (04-22-24 @ 03:05)    04-22    132<L>  |  97<L>  |  23  ----------------------------<  107<H>  3.7   |  19<L>  |  1.02    Ca    8.0<L>      22 Apr 2024 03:05  Phos  3.0     04-22  Mg     1.30     04-22    TPro  5.9<L>  /  Alb  2.7<L>  /  TBili  10.2<H>  /  DBili  x   /  AST  130<H>  /  ALT  75<H>  /  AlkPhos  612<H>  04-22    LIVER FUNCTIONS - ( 22 Apr 2024 03:05 )  Alb: 2.7 g/dL / Pro: 5.9 g/dL / ALK PHOS: 612 U/L / ALT: 75 U/L / AST: 130 U/L / GGT: x           PT/INR - ( 22 Apr 2024 03:05 )   PT: 13.8 sec;   INR: 1.23 ratio         PTT - ( 22 Apr 2024 03:05 )  PTT:33.8 sec  Urinalysis Basic - ( 22 Apr 2024 03:05 )    Color: x / Appearance: x / SG: x / pH: x  Gluc: 107 mg/dL / Ketone: x  / Bili: x / Urobili: x   Blood: x / Protein: x / Nitrite: x   Leuk Esterase: x / RBC: x / WBC x   Sq Epi: x / Non Sq Epi: x / Bacteria: x                              7.9    10.07 )-----------( 63       ( 22 Apr 2024 03:05 )             23.0                         7.7    8.15  )-----------( 49       ( 21 Apr 2024 14:50 )             22.9                         7.0    7.92  )-----------( 57       ( 21 Apr 2024 06:33 )             20.7                         7.2    10.33 )-----------( 65       ( 20 Apr 2024 06:32 )             22.3                         7.4    9.16  )-----------( 56       ( 19 Apr 2024 11:51 )             21.6     Imaging:    < from: CT Abdomen and Pelvis w/ IV Cont (04.18.24 @ 15:00) >  FINDINGS:  CHEST:  LUNGS AND LARGE AIRWAYS: Patent central airways. A 4 mm nodule in the   right lower lobe (7, 100). Bilateral subsegmental linear atelectasis.  PLEURA: Small right and trace left pleural effusions.  VESSELS: Atherosclerotic changes of the aorta and coronary arteries. An   aberrant right subclavian artery arising from posterior aortic arch.  HEART: Heart size is normal. No pericardial effusion.  MEDIASTINUM AND VANESSA: Slightly prominent lymph nodes, largest to the left   of trachea measuring 7 x 12 mm (2, 43).  CHEST WALL AND LOWER NECK: Within normal limits.    ABDOMEN AND PELVIS:  LIVER: Slightly enlarged measuring 22.3 cm in in length on the right.  BILE DUCTS: Linear hypodensities extending along the portal triads. The   extrahepatic biliary tree is not enlarged.  GALLBLADDER: Cholelithiasis. Gallbladder is contracted with wall   thickening.  SPLEEN: Splenomegaly. 17.3 cm in long axis. Question of linear,   peripheral hypodensities.  PANCREAS: Within normal limits.  ADRENALS: Within normal limits.  KIDNEYS/URETERS: Symmetric soft tissue infiltration of the renal   parenchyma and the collecting system at the hilum with extension along   the renal vasculature. Mild to moderate bilateral perinephric fatty   stranding.    BLADDER: Within normal limits.  REPRODUCTIVE ORGANS: Brachytherapy seeds in the prostate gland, which is   slightly irregular in appearance.    BOWEL: No bowel obstruction. Appendix is normal. Colonic diverticulosis.   Postsurgical changes in the small bowel. Left lower quadrant colostomy   and Hewitt's pouch. Irregular soft tissue density at the suture margins   of the rectum (2, 284).  PERITONEUM: Trace ascites.  VESSELS: Atherosclerotic changes.  RETROPERITONEUM/LYMPH NODES: Multiple enlarged lymph nodes in the pelvis   and along the iliac chains, a representative node on the right measures   1.0 x 1.8 cm (2, 295). Slightly enlarged lymph nodes in theporta hepatis   and in the upper abdomen.  ABDOMINAL WALL: Fat and bowel containing parastomal hernia.  BONES: Degenerative changes. Prior right rib fractures.    IMPRESSION:  Irregular soft tissue density at Hewitt's pouch may represent recurrence   of disease or postsurgical scarring.  Brachytherapy seeds in the prostate gland, which is slightly irregular in   appearance. Recurrence of disease is possible. Mild lymphadenopathy in   the abdomen and pelvis.  Significant but symmetric soft tissueinfiltration of bilateral kidneys,   perinephric space with extension along the renal hilum and the   vasculature. Both neoplastic and inflammatory processes are considered.  Either periportal edema or intrahepatic biliary ductal dilatation with   soft tissue infiltration. Extrahepatic biliary tree is normal in caliber.  Hepatosplenomegaly with question of small splenic infarcts.  Small right and trace left pleural effusions.    < end of copied text >    < from: US Abdomen Doppler (04.20.24 @ 15:02) >  FINDINGS:  The hepatic artery is patent with normal low resistance arterial waveform   and peak systolic velocity of 48 cm/s. The portal veins are patent with   normal directional flow andwaveforms. Hepatic veins are patent with   phasic waveforms.    IMPRESSION:  Patent hepatic vasculature.    < end of copied text >     Chief Complaint:  jaundice    HPI:    Mr. Rawls is a 63 year old male with prostate ca s/p chemo RT 8 years PTA, sigmoid diverticulitis/colovesicular fistula s/p Mati's (2016) with 2 aborted reversal attempts (11/2016, 6/2017) due to severe pelvic fibrosis/scar tissue, s/p colonoscopy 2018 without significant findings, noted to have parastomal hernia c/b skin excoriation and referred to Dr. Ramos for repair as well c/b peristomal bleeding. During pre-op evaluation, CT 1/2024 noted to have new extensive new infiltrative soft tissue within the liver along the portal triads, and within both kidneys, with splenomegaly and abnormal femoral marrow, findings concerning for possible lymphoma. Patient states he comes to the hospital today due to the peristomal bleeding; per surgery, patient instructed to go to the ED after being found to be icteric with elevated bili during pre-op work-up. GI was consulted for painless jaundice, subsequently noted to have intra/extrahepatic biliary ductal dilatation with debris in CBD concerning for choledocholithiasis. Patient reported yellowing of the skin and eyes x 3 weeks and unintentional weight loss of 5 pounds, otherwise feeling well. Upon admission, patient underwent EGD with EUS on 4/19 with findings of portal hypertensive gastropathy, normal bile duct with wall-thickening but no evidence of stones or mass, one enlarged peripancreatic LN a/w pancreatic parenchymal abnormalities. GI recommended MR/MRCP to assess kidneys and biliary tree, along with Hepatology consult for evaluation of portal hypertension.  Patient denies known history of jaundice, abnormal liver chemistry or cirrhosis. He has a history of social drinking without regular alcohol use. No known family history of liver disease.     Allergies:  No Known Allergies      Hospital Medications:  acetaminophen     Tablet .. 650 milliGRAM(s) Oral every 6 hours PRN  allopurinol 100 milliGRAM(s) Oral daily  atorvastatin 20 milliGRAM(s) Oral at bedtime  chlorhexidine 2% Cloths 1 Application(s) Topical daily  dextrose 10% Bolus 125 milliLiter(s) IV Bolus once  dextrose 5%. 1000 milliLiter(s) IV Continuous <Continuous>  dextrose 5%. 1000 milliLiter(s) IV Continuous <Continuous>  dextrose 50% Injectable 12.5 Gram(s) IV Push once  dextrose 50% Injectable 25 Gram(s) IV Push once  dextrose 50% Injectable 25 Gram(s) IV Push once  dextrose Oral Gel 15 Gram(s) Oral once  glucagon  Injectable 1 milliGRAM(s) IntraMuscular once  hydrochlorothiazide 25 milliGRAM(s) Oral daily  influenza  Vaccine (HIGH DOSE) 0.7 milliLiter(s) IntraMuscular once  insulin lispro (ADMELOG) corrective regimen sliding scale   SubCutaneous three times a day before meals  insulin lispro (ADMELOG) corrective regimen sliding scale   SubCutaneous at bedtime  losartan 100 milliGRAM(s) Oral daily  melatonin 3 milliGRAM(s) Oral at bedtime PRN  multivitamin 1 Tablet(s) Oral daily  ondansetron Injectable 4 milliGRAM(s) IV Push every 8 hours PRN  silver nitrate Applicator 1 Application(s) Topical once      PMHX/PSHX:  No pertinent past medical history    Prostate cancer    Diverticulitis    Type 2 diabetes mellitus    Gout    Parastomal hernia    Status post Shelley procedure    Family history:      Social History:   No alcohol or smoking    ROS:   See HPI    PHYSICAL EXAM:   GENERAL:  NAD, resting comfortably in bed  HEENT:  Sclera icteric  CHEST:  Normal effort  HEART:  HDS  ABDOMEN:  Soft, non-tender, non-distended  EXTREMITIES:  No edema  SKIN:  Warm & Dry. Jaundice.   NEURO:  Alert, conversant, no focal deficit    Vital Signs:  Vital Signs Last 24 Hrs  T(C): 37 (21 Apr 2024 21:30), Max: 37 (21 Apr 2024 21:30)  T(F): 98.6 (21 Apr 2024 21:30), Max: 98.6 (21 Apr 2024 21:30)  HR: 97 (21 Apr 2024 21:30) (96 - 97)  BP: 128/61 (21 Apr 2024 21:30) (128/61 - 130/74)  BP(mean): --  RR: 17 (21 Apr 2024 21:30) (17 - 17)  SpO2: 99% (21 Apr 2024 21:30) (99% - 100%)    Parameters below as of 21 Apr 2024 21:30  Patient On (Oxygen Delivery Method): room air    Daily     LABS:                        7.9    10.07 )-----------( 63       ( 22 Apr 2024 03:05 )             23.0     Mean Cell Volume: 96.2 fL (04-22-24 @ 03:05)    04-22    132<L>  |  97<L>  |  23  ----------------------------<  107<H>  3.7   |  19<L>  |  1.02    Ca    8.0<L>      22 Apr 2024 03:05  Phos  3.0     04-22  Mg     1.30     04-22    TPro  5.9<L>  /  Alb  2.7<L>  /  TBili  10.2<H>  /  DBili  x   /  AST  130<H>  /  ALT  75<H>  /  AlkPhos  612<H>  04-22    LIVER FUNCTIONS - ( 22 Apr 2024 03:05 )  Alb: 2.7 g/dL / Pro: 5.9 g/dL / ALK PHOS: 612 U/L / ALT: 75 U/L / AST: 130 U/L / GGT: x           PT/INR - ( 22 Apr 2024 03:05 )   PT: 13.8 sec;   INR: 1.23 ratio         PTT - ( 22 Apr 2024 03:05 )  PTT:33.8 sec  Urinalysis Basic - ( 22 Apr 2024 03:05 )    Color: x / Appearance: x / SG: x / pH: x  Gluc: 107 mg/dL / Ketone: x  / Bili: x / Urobili: x   Blood: x / Protein: x / Nitrite: x   Leuk Esterase: x / RBC: x / WBC x   Sq Epi: x / Non Sq Epi: x / Bacteria: x                              7.9    10.07 )-----------( 63       ( 22 Apr 2024 03:05 )             23.0                         7.7    8.15  )-----------( 49       ( 21 Apr 2024 14:50 )             22.9                         7.0    7.92  )-----------( 57       ( 21 Apr 2024 06:33 )             20.7                         7.2    10.33 )-----------( 65       ( 20 Apr 2024 06:32 )             22.3                         7.4    9.16  )-----------( 56       ( 19 Apr 2024 11:51 )             21.6     Imaging:    < from: CT Abdomen and Pelvis w/ IV Cont (04.18.24 @ 15:00) >  FINDINGS:  CHEST:  LUNGS AND LARGE AIRWAYS: Patent central airways. A 4 mm nodule in the   right lower lobe (7, 100). Bilateral subsegmental linear atelectasis.  PLEURA: Small right and trace left pleural effusions.  VESSELS: Atherosclerotic changes of the aorta and coronary arteries. An   aberrant right subclavian artery arising from posterior aortic arch.  HEART: Heart size is normal. No pericardial effusion.  MEDIASTINUM AND VANESSA: Slightly prominent lymph nodes, largest to the left   of trachea measuring 7 x 12 mm (2, 43).  CHEST WALL AND LOWER NECK: Within normal limits.    ABDOMEN AND PELVIS:  LIVER: Slightly enlarged measuring 22.3 cm in in length on the right.  BILE DUCTS: Linear hypodensities extending along the portal triads. The   extrahepatic biliary tree is not enlarged.  GALLBLADDER: Cholelithiasis. Gallbladder is contracted with wall   thickening.  SPLEEN: Splenomegaly. 17.3 cm in long axis. Question of linear,   peripheral hypodensities.  PANCREAS: Within normal limits.  ADRENALS: Within normal limits.  KIDNEYS/URETERS: Symmetric soft tissue infiltration of the renal   parenchyma and the collecting system at the hilum with extension along   the renal vasculature. Mild to moderate bilateral perinephric fatty   stranding.    BLADDER: Within normal limits.  REPRODUCTIVE ORGANS: Brachytherapy seeds in the prostate gland, which is   slightly irregular in appearance.    BOWEL: No bowel obstruction. Appendix is normal. Colonic diverticulosis.   Postsurgical changes in the small bowel. Left lower quadrant colostomy   and Hewitt's pouch. Irregular soft tissue density at the suture margins   of the rectum (2, 284).  PERITONEUM: Trace ascites.  VESSELS: Atherosclerotic changes.  RETROPERITONEUM/LYMPH NODES: Multiple enlarged lymph nodes in the pelvis   and along the iliac chains, a representative node on the right measures   1.0 x 1.8 cm (2, 295). Slightly enlarged lymph nodes in theporta hepatis   and in the upper abdomen.  ABDOMINAL WALL: Fat and bowel containing parastomal hernia.  BONES: Degenerative changes. Prior right rib fractures.    IMPRESSION:  Irregular soft tissue density at Hewitt's pouch may represent recurrence   of disease or postsurgical scarring.  Brachytherapy seeds in the prostate gland, which is slightly irregular in   appearance. Recurrence of disease is possible. Mild lymphadenopathy in   the abdomen and pelvis.  Significant but symmetric soft tissueinfiltration of bilateral kidneys,   perinephric space with extension along the renal hilum and the   vasculature. Both neoplastic and inflammatory processes are considered.  Either periportal edema or intrahepatic biliary ductal dilatation with   soft tissue infiltration. Extrahepatic biliary tree is normal in caliber.  Hepatosplenomegaly with question of small splenic infarcts.  Small right and trace left pleural effusions.    < end of copied text >    < from: US Abdomen Doppler (04.20.24 @ 15:02) >  FINDINGS:  The hepatic artery is patent with normal low resistance arterial waveform   and peak systolic velocity of 48 cm/s. The portal veins are patent with   normal directional flow andwaveforms. Hepatic veins are patent with   phasic waveforms.    IMPRESSION:  Patent hepatic vasculature.    < end of copied text >

## 2024-04-22 NOTE — PROGRESS NOTE ADULT - SUBJECTIVE AND OBJECTIVE BOX
E Team Surgery Daily Progress Note    Subjective:   Patient seen at bedside this AM. Reports feeling well, no abdominal pain, no N/V. Reports no additional bleeding observed from ostomy site. No other concern reported.     24h Events:   - Overnight, no acute events    Objective:  Vital Signs  T(C): 36.8 (04-22 @ 05:38), Max: 37 (04-21 @ 21:30)  HR: 81 (04-22 @ 05:38) (81 - 97)  BP: 134/76 (04-22 @ 05:38) (128/61 - 134/76)  RR: 18 (04-22 @ 05:38) (17 - 18)  SpO2: 100% (04-22 @ 05:38) (99% - 100%)  04-21-24 @ 07:01  -  04-22-24 @ 07:00  --------------------------------------------------------  IN:  Total IN: 0 mL    OUT:    Colostomy (mL): 100 mL  Total OUT: 100 mL    Total NET: -100 mL          Physical Exam:  GEN: resting in bed comfortably in NAD, jaundiced throughout body  RESP: no increased WOB  ABD: jaundiced, belly soft, non-distended, non-tender without rebound tenderness or guarding  EXTR: warm, grossly intact  NEURO: AAOx4    Labs:                        7.9    10.07 )-----------( 63       ( 22 Apr 2024 03:05 )             23.0   04-22    132<L>  |  97<L>  |  23  ----------------------------<  107<H>  3.7   |  19<L>  |  1.02    Ca    8.0<L>      22 Apr 2024 03:05  Phos  3.0     04-22  Mg     1.30     04-22    TPro  5.9<L>  /  Alb  2.7<L>  /  TBili  10.2<H>  /  DBili  x   /  AST  130<H>  /  ALT  75<H>  /  AlkPhos  612<H>  04-22    CAPILLARY BLOOD GLUCOSE      POCT Blood Glucose.: 132 mg/dL (22 Apr 2024 12:51)  POCT Blood Glucose.: 129 mg/dL (22 Apr 2024 08:51)  POCT Blood Glucose.: 125 mg/dL (22 Apr 2024 03:10)  POCT Blood Glucose.: 144 mg/dL (21 Apr 2024 21:20)  POCT Blood Glucose.: 116 mg/dL (21 Apr 2024 18:38)      Medications:   MEDICATIONS  (STANDING):  allopurinol 100 milliGRAM(s) Oral daily  atorvastatin 20 milliGRAM(s) Oral at bedtime  chlorhexidine 2% Cloths 1 Application(s) Topical daily  dextrose 10% Bolus 125 milliLiter(s) IV Bolus once  dextrose 5%. 1000 milliLiter(s) (100 mL/Hr) IV Continuous <Continuous>  dextrose 5%. 1000 milliLiter(s) (50 mL/Hr) IV Continuous <Continuous>  dextrose 50% Injectable 12.5 Gram(s) IV Push once  dextrose 50% Injectable 25 Gram(s) IV Push once  dextrose 50% Injectable 25 Gram(s) IV Push once  dextrose Oral Gel 15 Gram(s) Oral once  glucagon  Injectable 1 milliGRAM(s) IntraMuscular once  hydrochlorothiazide 25 milliGRAM(s) Oral daily  influenza  Vaccine (HIGH DOSE) 0.7 milliLiter(s) IntraMuscular once  insulin lispro (ADMELOG) corrective regimen sliding scale   SubCutaneous three times a day before meals  insulin lispro (ADMELOG) corrective regimen sliding scale   SubCutaneous at bedtime  losartan 100 milliGRAM(s) Oral daily  multivitamin 1 Tablet(s) Oral daily  silver nitrate Applicator 1 Application(s) Topical once    MEDICATIONS  (PRN):  acetaminophen     Tablet .. 650 milliGRAM(s) Oral every 6 hours PRN Temp greater or equal to 38C (100.4F), Mild Pain (1 - 3)  melatonin 3 milliGRAM(s) Oral at bedtime PRN Insomnia  ondansetron Injectable 4 milliGRAM(s) IV Push every 8 hours PRN Nausea and/or Vomiting      Imaging:

## 2024-04-22 NOTE — PROGRESS NOTE ADULT - ASSESSMENT
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 Outpatient imaging notable for infiltrative soft tissue process involving the kidneys and liver/portal triad. Patient having bleeding from ostomy site again. Pending ERCP with GI for jaundice.

## 2024-04-22 NOTE — PROGRESS NOTE ADULT - PROBLEM SELECTOR PLAN 2
likely related to bleeding from excoriated skin at stoma  possibly multifactorial with folate/b12 deficiency given macrocytosis, vs reticulocytosis from blood loss    - iron, b12, folate ok, macrocytosis likely 2/2 reticulocytosis  - resumed bleeding from stoma site, surgery consulted, bleeding controlled with silver nitrate  - 2u PRBC for hgb 6.5 on admission, 1u PLT for 51K with active bleeding  - thrombocyotpenia likely related to splenomegaly  -Bleeding on(4/21)- ordered 1 unit PRBCs   - maintain active T&S

## 2024-04-22 NOTE — PROGRESS NOTE ADULT - ASSESSMENT
62 y/o male with a history of prostate cancer s/p  chemo/radiation 8 years ago and sigmoid diverticulitis/colovesical fistula who is s/p Hewitt's procedure on  08/2016,  s/p 2 aborted reversal attempts in 11/2016 by Dr. Parkinson and again 06/29/2017 at [facility] by Dr. Oconnell due to severe pelvic fibrosis/scar tissue,Pt saw Dr Tan Vargas on 02/28 for possible repair of parastomal hernia. outpatient CT findings of  new periportal edema/thickening, splenomegaly and bilateral diffuse infiltrative soft tissue throughout collecting system and kidneys, findings concerning for possible lymphoma. During pre surgical eval, found to have painless jaundice. WBC wnl, TB 7.8, transaminitis.  US with 7 mm CBD, mild intra/extrahepatic biliary ductal dilatation with debris in CBD. Now s/p EGD with EUS with GI on 4/19.     PLAN:  - No acute surgical intervention  - Liver US reviewed; patent hepatic vasculature  - MRCP done, pending read  - GI recommendations  - Monitor CBC for Hb, transfuse as need to Hb>7  - Rest of care per primary team    Plan discussed with Chief Resident on behalf of Dr. Vargas    E-Team surgery  [id number]

## 2024-04-22 NOTE — ADVANCED PRACTICE NURSE CONSULT - ASSESSMENT
Upon assessment moderate amount of papo blood in pouch, pouch removed, active ooze at 10oclock at mucocutaneous junction, Surgical provider, Jose Buck, called to bedside. Upon physician arrival bleed stopped. Peristomal skin redressed, pouch reapplied, teaching reinforced. 
Patient is a&ox4, independent. Denies any current bleeding. reports he has been independently managing his stoma for 9 years. States he had started to notice the purple coloration 2 months ago.    LLQ - stoma 1 5/8". Ostomy pink and moist with peristomal ulceration noted. Pouch gently removed to expose circumferential purple hue discoloration of peristomal skin (peristomal varices) and ulceration from 2-11 o clock. Ulceration widest at 9 o clock, 2cm, 0.2cm in depth. Wound bed 50% red moist granulation, 40% fibrinous film, 10% maceration. No bleeding noted. Area cleansed, liquid barrier film applied to intact periwound skin, hydrocolloid applied over areas of ulceration. Patient states he is familiar with hydrocolloid dressing as he has used it in the past.     Patient with parastomal hernia, recommended 1 piece ostomy pouch for better fit however patient states he has been using a 2 piece system with years and wants to stick with it. denies any leakage. Provided samples of 1 piece pouch to take home.     Educated patient on importance of cutting wafer to fit to stoma, need for gentle removal of adhesives to prevent skin tears and bleeding. Patient demonstrated understanding through teachback. Extra supplies provided to patient, contact information provided for any follow up questions. 
Patient resting in bed, pouch intact, removed.   Stoma size: 1 5/8". Ostomy pink and moist with peristomal ulceration with slight improvement from previous assessment, no maceration, <25% reepithelialization noted at wound edges. No bleeding noted. Area cleansed, stoma powder applied along with liquid barrier film- educated patient on use. Questions answered.     Additional supplies at bedside- patient awaiting an order for a precut two piece flat drainable appliance from [facility] which had been ordered from the ostomy nurse from a different facility.

## 2024-04-23 LAB
ALBUMIN SERPL ELPH-MCNC: 2.7 G/DL — LOW (ref 3.3–5)
ALP SERPL-CCNC: 592 U/L — HIGH (ref 40–120)
ALT FLD-CCNC: 75 U/L — HIGH (ref 4–41)
ANION GAP SERPL CALC-SCNC: 14 MMOL/L — SIGNIFICANT CHANGE UP (ref 7–14)
AST SERPL-CCNC: 103 U/L — HIGH (ref 4–40)
BASOPHILS # BLD AUTO: 0.01 K/UL — SIGNIFICANT CHANGE UP (ref 0–0.2)
BASOPHILS NFR BLD AUTO: 0.1 % — SIGNIFICANT CHANGE UP (ref 0–2)
BILIRUB SERPL-MCNC: 10.6 MG/DL — HIGH (ref 0.2–1.2)
BUN SERPL-MCNC: 27 MG/DL — HIGH (ref 7–23)
CALCIUM SERPL-MCNC: 8.6 MG/DL — SIGNIFICANT CHANGE UP (ref 8.4–10.5)
CHLORIDE SERPL-SCNC: 98 MMOL/L — SIGNIFICANT CHANGE UP (ref 98–107)
CO2 SERPL-SCNC: 21 MMOL/L — LOW (ref 22–31)
CREAT SERPL-MCNC: 1.02 MG/DL — SIGNIFICANT CHANGE UP (ref 0.5–1.3)
EGFR: 82 ML/MIN/1.73M2 — SIGNIFICANT CHANGE UP
EOSINOPHIL # BLD AUTO: 0.02 K/UL — SIGNIFICANT CHANGE UP (ref 0–0.5)
EOSINOPHIL NFR BLD AUTO: 0.2 % — SIGNIFICANT CHANGE UP (ref 0–6)
GLUCOSE BLDC GLUCOMTR-MCNC: 138 MG/DL — HIGH (ref 70–99)
GLUCOSE BLDC GLUCOMTR-MCNC: 168 MG/DL — HIGH (ref 70–99)
GLUCOSE BLDC GLUCOMTR-MCNC: 196 MG/DL — HIGH (ref 70–99)
GLUCOSE BLDC GLUCOMTR-MCNC: 206 MG/DL — HIGH (ref 70–99)
GLUCOSE SERPL-MCNC: 117 MG/DL — HIGH (ref 70–99)
HCT VFR BLD CALC: 22.9 % — LOW (ref 39–50)
HGB BLD-MCNC: 8 G/DL — LOW (ref 13–17)
IANC: 5.73 K/UL — SIGNIFICANT CHANGE UP (ref 1.8–7.4)
IMM GRANULOCYTES NFR BLD AUTO: 6.2 % — HIGH (ref 0–0.9)
LYMPHOCYTES # BLD AUTO: 1.33 K/UL — SIGNIFICANT CHANGE UP (ref 1–3.3)
LYMPHOCYTES # BLD AUTO: 15.3 % — SIGNIFICANT CHANGE UP (ref 13–44)
MAGNESIUM SERPL-MCNC: 1.2 MG/DL — LOW (ref 1.6–2.6)
MCHC RBC-ENTMCNC: 33.2 PG — SIGNIFICANT CHANGE UP (ref 27–34)
MCHC RBC-ENTMCNC: 34.9 GM/DL — SIGNIFICANT CHANGE UP (ref 32–36)
MCV RBC AUTO: 95 FL — SIGNIFICANT CHANGE UP (ref 80–100)
MITOCHONDRIA AB SER-ACNC: SIGNIFICANT CHANGE UP
MONOCYTES # BLD AUTO: 1.04 K/UL — HIGH (ref 0–0.9)
MONOCYTES NFR BLD AUTO: 12 % — SIGNIFICANT CHANGE UP (ref 2–14)
NEUTROPHILS # BLD AUTO: 5.73 K/UL — SIGNIFICANT CHANGE UP (ref 1.8–7.4)
NEUTROPHILS NFR BLD AUTO: 66.2 % — SIGNIFICANT CHANGE UP (ref 43–77)
NRBC # BLD: 0 /100 WBCS — SIGNIFICANT CHANGE UP (ref 0–0)
NRBC # FLD: 0 K/UL — SIGNIFICANT CHANGE UP (ref 0–0)
PHOSPHATE SERPL-MCNC: 3.5 MG/DL — SIGNIFICANT CHANGE UP (ref 2.5–4.5)
PLATELET # BLD AUTO: 59 K/UL — LOW (ref 150–400)
POTASSIUM SERPL-MCNC: 3.6 MMOL/L — SIGNIFICANT CHANGE UP (ref 3.5–5.3)
POTASSIUM SERPL-SCNC: 3.6 MMOL/L — SIGNIFICANT CHANGE UP (ref 3.5–5.3)
PROT SERPL-MCNC: 6 G/DL — SIGNIFICANT CHANGE UP (ref 6–8.3)
RBC # BLD: 2.41 M/UL — LOW (ref 4.2–5.8)
RBC # FLD: 25.6 % — HIGH (ref 10.3–14.5)
SMOOTH MUSCLE AB SER-ACNC: ABNORMAL
SODIUM SERPL-SCNC: 133 MMOL/L — LOW (ref 135–145)
WBC # BLD: 8.67 K/UL — SIGNIFICANT CHANGE UP (ref 3.8–10.5)
WBC # FLD AUTO: 8.67 K/UL — SIGNIFICANT CHANGE UP (ref 3.8–10.5)

## 2024-04-23 PROCEDURE — 99232 SBSQ HOSP IP/OBS MODERATE 35: CPT | Mod: GC

## 2024-04-23 PROCEDURE — 99232 SBSQ HOSP IP/OBS MODERATE 35: CPT

## 2024-04-23 RX ORDER — MAGNESIUM SULFATE 500 MG/ML
2 VIAL (ML) INJECTION ONCE
Refills: 0 | Status: COMPLETED | OUTPATIENT
Start: 2024-04-23 | End: 2024-04-23

## 2024-04-23 RX ADMIN — Medication 3 MILLIGRAM(S): at 22:24

## 2024-04-23 RX ADMIN — Medication 25 GRAM(S): at 09:11

## 2024-04-23 RX ADMIN — ATORVASTATIN CALCIUM 20 MILLIGRAM(S): 80 TABLET, FILM COATED ORAL at 22:24

## 2024-04-23 RX ADMIN — Medication 100 MILLIGRAM(S): at 11:55

## 2024-04-23 RX ADMIN — Medication 2: at 13:11

## 2024-04-23 RX ADMIN — Medication 1: at 18:21

## 2024-04-23 RX ADMIN — Medication 1 TABLET(S): at 11:55

## 2024-04-23 RX ADMIN — CHLORHEXIDINE GLUCONATE 1 APPLICATION(S): 213 SOLUTION TOPICAL at 11:56

## 2024-04-23 RX ADMIN — LOSARTAN POTASSIUM 100 MILLIGRAM(S): 100 TABLET, FILM COATED ORAL at 06:00

## 2024-04-23 NOTE — CONSULT NOTE ADULT - SUBJECTIVE AND OBJECTIVE BOX
Interventional Radiology      HPI: 64y Male with prostate ca s/p chemo RT 8 years PTA, sigmoid diverticulitis/colovesicular fistula s/p Mati's (2016) with 2 aborted reversal attempts (11/2016, 6/2017) due to severe pelvic fibrosis/scar tissue, s/p colonoscopy (2018) without significant findings, parastomal hernia c/b skin excoriation and referred to Dr. Ramos for repair c/b peristomal bleeding. During pre-op evaluation, CT 1/2024 noted to have new extensive new infiltrative soft tissue within the liver along the portal triads, and within both kidneys, with splenomegaly and abnormal femoral marrow, findings concerning for possible lymphoma. Patient is admitted for evaluation of jaundice. IR was consulted for biopsy.    Allergies: No Known Allergies    Medications (Abx/Cardiac/Anticoagulation/Blood Products)    hydrochlorothiazide: 25 milliGRAM(s) Oral (04-23 @ 06:01)  losartan: 100 milliGRAM(s) Oral (04-23 @ 06:00)    Data:    T(C): 36.7  HR: 84  BP: 119/70  RR: 18  SpO2: 100%    -WBC 8.67 / HgB 8.0 / Hct 22.9 / Plt 59  -Na 133 / Cl 98 / BUN 27 / Glucose 117  -K 3.6 / CO2 21 / Cr 1.02  -ALT 75 / Alk Phos 592 / T.Bili 10.6  -INR 1.23 / PTT 33.8      Radiology:   Imaging reviewed.    Assessment/Plan:   64y Male with prostate ca s/p chemo RT 8 years PTA, sigmoid diverticulitis/colovesicular fistula s/p Mati's (2016) with 2 aborted reversal attempts (11/2016, 6/2017) due to severe pelvic fibrosis/scar tissue, s/p colonoscopy (2018) without significant findings, parastomal hernia c/b skin excoriation and referred to Dr. Ramos for repair c/b peristomal bleeding. During pre-op evaluation, CT 4/18/2024 revealed extensive new infiltrative soft tissue within the liver along the portal triads, and within both kidneys, with splenomegaly and abnormal femoral marrow, findings concerning for possible lymphoma. Patient is admitted for evaluation of jaundice. IR was consulted for biopsy.    -- agree with imaging findings, discussed case with Dr. Robles  -- on CT Abdomen/Pelvis (4/18), there is a right iliac chain lymph node (series 2, image 295) amenable for CT-guided biopsy  -- IR will plan to perform CT-guided biopsy of right iliac chain lymph node on 4/24  -- NPO at midnight on 4/24  -- please complete IR pre-procedure note  -- please place IR procedure request order under Dr. Robles    --  Phil Shaikh MD PGY-3  Available on [employer] Teams    - Non-emergent consults: Place IR consult order in [place]  - Emergent issues (pager): [facility] [phone]; [facility] [phone]; [phone]  - Scheduling questions: [facility] [phone]; [facility] [phone]  - Clinic/outpatient booking: [facility] [phone]; [facility] [phone]

## 2024-04-23 NOTE — PROGRESS NOTE ADULT - ASSESSMENT
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 Outpatient imaging notable for infiltrative soft tissue process involving the kidneys and liver/portal triad. Likely needs biopsy via EGD or IR.

## 2024-04-23 NOTE — PROGRESS NOTE ADULT - SUBJECTIVE AND OBJECTIVE BOX
Tommy Sparks, PGY-1  Please contact on Teams    SHANTEJEREMIASJR SUSIE  64y  Male    Reason for Admission:     SUBJECTIVE/INTERVAL EVENTS: No acute events. Pt seen and examined at bedside.    Physical Exam:   General: NAD, jaundiced  HEENT: PERRL, EOMI  Neck: Supple, no JVD  Lungs: CTA bilaterally  Heart: RRR, normal S1S2, no murmurs/rubs/gallops  Abdomen: Soft, ND/NT  Extremities: 2+ peripheral pulses, no cyanosis/clubbing/edema, full ROM  Skin: Warm, well-perfused  Neuro: A&O x3, no focal deficits    Vital Signs Last 24 Hrs  T(C): 36.7 (23 Apr 2024 05:31), Max: 36.8 (22 Apr 2024 21:22)  T(F): 98.1 (23 Apr 2024 05:31), Max: 98.2 (22 Apr 2024 21:22)  HR: 84 (23 Apr 2024 05:31) (83 - 91)  BP: 119/70 (23 Apr 2024 05:31) (115/61 - 137/75)  BP(mean): --  RR: 18 (23 Apr 2024 05:31) (18 - 18)  SpO2: 100% (23 Apr 2024 05:31) (99% - 100%)    Parameters below as of 23 Apr 2024 05:31  Patient On (Oxygen Delivery Method): room air          Consultant(s) Notes Reviewed:  [x] YES  [ ] NO  Care Discussed with Consultants/Other Providers [x] YES  [ ] NO    LABS:                        7.9    10.07 )-----------( 63       ( 22 Apr 2024 03:05 )             23.0                         7.7    8.15  )-----------( 49       ( 21 Apr 2024 14:50 )             22.9                         7.0    7.92  )-----------( 57       ( 21 Apr 2024 06:33 )             20.7         Urinalysis Basic - ( 22 Apr 2024 03:05 )    Color: x / Appearance: x / SG: x / pH: x  Gluc: 107 mg/dL / Ketone: x  / Bili: x / Urobili: x   Blood: x / Protein: x / Nitrite: x   Leuk Esterase: x / RBC: x / WBC x   Sq Epi: x / Non Sq Epi: x / Bacteria: x        RADIOLOGY & ADDITIONAL TESTS:    Imaging Personally Reviewed:  [x] YES  [ ] NO    MEDICATIONS  (STANDING):  allopurinol 100 milliGRAM(s) Oral daily  atorvastatin 20 milliGRAM(s) Oral at bedtime  chlorhexidine 2% Cloths 1 Application(s) Topical daily  dextrose 10% Bolus 125 milliLiter(s) IV Bolus once  dextrose 5%. 1000 milliLiter(s) (100 mL/Hr) IV Continuous <Continuous>  dextrose 5%. 1000 milliLiter(s) (50 mL/Hr) IV Continuous <Continuous>  dextrose 50% Injectable 12.5 Gram(s) IV Push once  dextrose 50% Injectable 25 Gram(s) IV Push once  dextrose 50% Injectable 25 Gram(s) IV Push once  dextrose Oral Gel 15 Gram(s) Oral once  glucagon  Injectable 1 milliGRAM(s) IntraMuscular once  hydrochlorothiazide 25 milliGRAM(s) Oral daily  influenza  Vaccine (HIGH DOSE) 0.7 milliLiter(s) IntraMuscular once  insulin lispro (ADMELOG) corrective regimen sliding scale   SubCutaneous three times a day before meals  insulin lispro (ADMELOG) corrective regimen sliding scale   SubCutaneous at bedtime  losartan 100 milliGRAM(s) Oral daily  multivitamin 1 Tablet(s) Oral daily  silver nitrate Applicator 1 Application(s) Topical once    MEDICATIONS  (PRN):  acetaminophen     Tablet .. 650 milliGRAM(s) Oral every 6 hours PRN Temp greater or equal to 38C (100.4F), Mild Pain (1 - 3)  melatonin 3 milliGRAM(s) Oral at bedtime PRN Insomnia  ondansetron Injectable 4 milliGRAM(s) IV Push every 8 hours PRN Nausea and/or Vomiting      HEALTH ISSUES - PROBLEM Dx:  Painless jaundice    Anemia due to acute blood loss    Skin excoriation    Prophylactic measure    DM (diabetes mellitus), type 2

## 2024-04-23 NOTE — CONSULT NOTE ADULT - CONSULT REASON
Painless jaundice
greyson-stomal skin oozing
Cholecholithiasis
portal hypertension
Liver vs renal biopsy

## 2024-04-23 NOTE — PROGRESS NOTE ADULT - SUBJECTIVE AND OBJECTIVE BOX
Interval Events:   Patient underwent MRI with findings detailed below  He continues to feel well without complaints  Liver chemistry is relatively stable, remains elevated     Hospital Medications:  acetaminophen     Tablet .. 650 milliGRAM(s) Oral every 6 hours PRN  allopurinol 100 milliGRAM(s) Oral daily  atorvastatin 20 milliGRAM(s) Oral at bedtime  chlorhexidine 2% Cloths 1 Application(s) Topical daily  dextrose 10% Bolus 125 milliLiter(s) IV Bolus once  dextrose 5%. 1000 milliLiter(s) IV Continuous <Continuous>  dextrose 5%. 1000 milliLiter(s) IV Continuous <Continuous>  dextrose 50% Injectable 25 Gram(s) IV Push once  dextrose 50% Injectable 25 Gram(s) IV Push once  dextrose 50% Injectable 12.5 Gram(s) IV Push once  dextrose Oral Gel 15 Gram(s) Oral once  glucagon  Injectable 1 milliGRAM(s) IntraMuscular once  hydrochlorothiazide 25 milliGRAM(s) Oral daily  influenza  Vaccine (HIGH DOSE) 0.7 milliLiter(s) IntraMuscular once  insulin lispro (ADMELOG) corrective regimen sliding scale   SubCutaneous three times a day before meals  insulin lispro (ADMELOG) corrective regimen sliding scale   SubCutaneous at bedtime  losartan 100 milliGRAM(s) Oral daily  magnesium sulfate  IVPB 2 Gram(s) IV Intermittent once  melatonin 3 milliGRAM(s) Oral at bedtime PRN  multivitamin 1 Tablet(s) Oral daily  ondansetron Injectable 4 milliGRAM(s) IV Push every 8 hours PRN  silver nitrate Applicator 1 Application(s) Topical once      ROS: See above.    PHYSICAL EXAM:   Vital Signs:  Vital Signs Last 24 Hrs  T(C): 36.7 (23 Apr 2024 05:31), Max: 36.8 (22 Apr 2024 21:22)  T(F): 98.1 (23 Apr 2024 05:31), Max: 98.2 (22 Apr 2024 21:22)  HR: 84 (23 Apr 2024 05:31) (83 - 91)  BP: 119/70 (23 Apr 2024 05:31) (115/61 - 137/75)  BP(mean): --  RR: 18 (23 Apr 2024 05:31) (18 - 18)  SpO2: 100% (23 Apr 2024 05:31) (99% - 100%)    Parameters below as of 23 Apr 2024 05:31  Patient On (Oxygen Delivery Method): room air        GENERAL:  NAD, resting comfortably in bed  HEENT:  sclera anicteric  CHEST:  Normal Effort  HEART:  HDS  ABDOMEN:  Soft, non-tender, non-distended  SKIN:  Warm & Dry. No jaundice  NEURO:  Alert, conversant, no focal deficit    LABS:                        8.0    8.67  )-----------( 59       ( 23 Apr 2024 06:47 )             22.9     Mean Cell Volume: 95.0 fL (04-23-24 @ 06:47)    04-23    133<L>  |  98  |  27<H>  ----------------------------<  117<H>  3.6   |  21<L>  |  1.02    Ca    8.6      23 Apr 2024 06:47  Phos  3.5     04-23  Mg     1.20     04-23    TPro  6.0  /  Alb  2.7<L>  /  TBili  10.6<H>  /  DBili  x   /  AST  103<H>  /  ALT  75<H>  /  AlkPhos  592<H>  04-23    LIVER FUNCTIONS - ( 23 Apr 2024 06:47 )  Alb: 2.7 g/dL / Pro: 6.0 g/dL / ALK PHOS: 592 U/L / ALT: 75 U/L / AST: 103 U/L / GGT: x           PT/INR - ( 22 Apr 2024 03:05 )   PT: 13.8 sec;   INR: 1.23 ratio         PTT - ( 22 Apr 2024 03:05 )  PTT:33.8 sec      < from: MR Abdomen w/wo IV Cont (04.22.24 @ 13:38) >  FINDINGS:  LOWER CHEST: Small bilateral pleural effusions.    LIVER: Mild intrahepatic biliary ductal dilatation.  BILE DUCTS: Normal caliber.  GALLBLADDER: Cholelithiasis.  SPLEEN: Splenomegaly. Small splenic infarcts.  PANCREAS: Within normal limits.  ADRENALS: Within normal limits.  KIDNEYS/URETERS: Bilateral urothelial inflammation along the renal hilum   causing bilateral mild hydronephrosis.    VISUALIZED PORTIONS:  BOWEL: Within normal limits.  PERITONEUM: Small volume perihepatic ascites.  VESSELS: Atherosclerotic changes.  RETROPERITONEUM/LYMPH NODES: No lymphadenopathy.  ABDOMINAL WALL: Within normal limits.  BONES: Old right posterior rib fractures. Diffuse low signal of the   marrow.    IMPRESSION:  Mild intrahepatic biliary ductal dilatation. No extrahepatic biliary   ductal dilatation or choledocholithiasis. Bilateral urothelial   inflammation along the renal hilum causing bilateral mild hydronephrosis.   Consider systemic infiltrating process, such as IgG for disease.    Splenomegaly. Diffuse low signal of the marrow. Consider lymphoma.    < end of copied text >   Interval Events:   Patient underwent MRI with findings detailed below  He continues to feel well without complaints  Liver chemistry is relatively stable, remains elevated     Hospital Medications:  acetaminophen     Tablet .. 650 milliGRAM(s) Oral every 6 hours PRN  allopurinol 100 milliGRAM(s) Oral daily  atorvastatin 20 milliGRAM(s) Oral at bedtime  chlorhexidine 2% Cloths 1 Application(s) Topical daily  dextrose 10% Bolus 125 milliLiter(s) IV Bolus once  dextrose 5%. 1000 milliLiter(s) IV Continuous <Continuous>  dextrose 5%. 1000 milliLiter(s) IV Continuous <Continuous>  dextrose 50% Injectable 25 Gram(s) IV Push once  dextrose 50% Injectable 25 Gram(s) IV Push once  dextrose 50% Injectable 12.5 Gram(s) IV Push once  dextrose Oral Gel 15 Gram(s) Oral once  glucagon  Injectable 1 milliGRAM(s) IntraMuscular once  hydrochlorothiazide 25 milliGRAM(s) Oral daily  influenza  Vaccine (HIGH DOSE) 0.7 milliLiter(s) IntraMuscular once  insulin lispro (ADMELOG) corrective regimen sliding scale   SubCutaneous three times a day before meals  insulin lispro (ADMELOG) corrective regimen sliding scale   SubCutaneous at bedtime  losartan 100 milliGRAM(s) Oral daily  magnesium sulfate  IVPB 2 Gram(s) IV Intermittent once  melatonin 3 milliGRAM(s) Oral at bedtime PRN  multivitamin 1 Tablet(s) Oral daily  ondansetron Injectable 4 milliGRAM(s) IV Push every 8 hours PRN  silver nitrate Applicator 1 Application(s) Topical once    ROS: See above.    PHYSICAL EXAM:   Vital Signs:  Vital Signs Last 24 Hrs  T(C): 36.7 (23 Apr 2024 05:31), Max: 36.8 (22 Apr 2024 21:22)  T(F): 98.1 (23 Apr 2024 05:31), Max: 98.2 (22 Apr 2024 21:22)  HR: 84 (23 Apr 2024 05:31) (83 - 91)  BP: 119/70 (23 Apr 2024 05:31) (115/61 - 137/75)  BP(mean): --  RR: 18 (23 Apr 2024 05:31) (18 - 18)  SpO2: 100% (23 Apr 2024 05:31) (99% - 100%)    Parameters below as of 23 Apr 2024 05:31  Patient On (Oxygen Delivery Method): room air    GENERAL:  NAD, resting comfortably in bed  HEENT:  sclera anicteric  CHEST:  Normal Effort  HEART:  HDS  ABDOMEN:  Soft, non-tender, non-distended. Ostomy intact   SKIN:  Warm & Dry. No jaundice  NEURO:  Alert, conversant, no focal deficit    LABS:                        8.0    8.67  )-----------( 59       ( 23 Apr 2024 06:47 )             22.9     Mean Cell Volume: 95.0 fL (04-23-24 @ 06:47)    04-23    133<L>  |  98  |  27<H>  ----------------------------<  117<H>  3.6   |  21<L>  |  1.02    Ca    8.6      23 Apr 2024 06:47  Phos  3.5     04-23  Mg     1.20     04-23    TPro  6.0  /  Alb  2.7<L>  /  TBili  10.6<H>  /  DBili  x   /  AST  103<H>  /  ALT  75<H>  /  AlkPhos  592<H>  04-23    LIVER FUNCTIONS - ( 23 Apr 2024 06:47 )  Alb: 2.7 g/dL / Pro: 6.0 g/dL / ALK PHOS: 592 U/L / ALT: 75 U/L / AST: 103 U/L / GGT: x           PT/INR - ( 22 Apr 2024 03:05 )   PT: 13.8 sec;   INR: 1.23 ratio         PTT - ( 22 Apr 2024 03:05 )  PTT:33.8 sec      < from: MR Abdomen w/wo IV Cont (04.22.24 @ 13:38) >  FINDINGS:  LOWER CHEST: Small bilateral pleural effusions.    LIVER: Mild intrahepatic biliary ductal dilatation.  BILE DUCTS: Normal caliber.  GALLBLADDER: Cholelithiasis.  SPLEEN: Splenomegaly. Small splenic infarcts.  PANCREAS: Within normal limits.  ADRENALS: Within normal limits.  KIDNEYS/URETERS: Bilateral urothelial inflammation along the renal hilum   causing bilateral mild hydronephrosis.    VISUALIZED PORTIONS:  BOWEL: Within normal limits.  PERITONEUM: Small volume perihepatic ascites.  VESSELS: Atherosclerotic changes.  RETROPERITONEUM/LYMPH NODES: No lymphadenopathy.  ABDOMINAL WALL: Within normal limits.  BONES: Old right posterior rib fractures. Diffuse low signal of the   marrow.    IMPRESSION:  Mild intrahepatic biliary ductal dilatation. No extrahepatic biliary   ductal dilatation or choledocholithiasis. Bilateral urothelial   inflammation along the renal hilum causing bilateral mild hydronephrosis.   Consider systemic infiltrating process, such as IgG for disease.    Splenomegaly. Diffuse low signal of the marrow. Consider lymphoma.    < end of copied text >

## 2024-04-23 NOTE — PROGRESS NOTE ADULT - SUBJECTIVE AND OBJECTIVE BOX
SURGICAL ONCOLOGY PROGRESS NOTE    MR abdomen showed intrahepatic ductal dilatation, no extrahepatic ductal dilatation, no choledocholithiasis, bilateral urothelial inflammation, splenomegaly.  ERCP deferred at this time per GI  Hepatology consulted  S: Patient doing well, no acute complaints. Tolerating regular diet. Passing gas and liquid stool via colostomy. No further bleeding from colostomy.     O: Vital Signs  T(C): 36.8 (04-22 @ 21:22), Max: 36.8 (04-22 @ 21:22)  HR: 91 (04-22 @ 21:22) (83 - 91)  BP: 115/61 (04-22 @ 21:22) (115/61 - 137/75)  RR: 18 (04-22 @ 21:22) (18 - 18)  SpO2: 100% (04-22 @ 21:22) (99% - 100%)  04-21-24 @ 07:01  -  04-22-24 @ 07:00  --------------------------------------------------------  IN:  Total IN: 0 mL    OUT:    Colostomy (mL): 100 mL  Total OUT: 100 mL    Total NET: -100 mL      04-22-24 @ 07:01  -  04-23-24 @ 06:31  --------------------------------------------------------  IN:  Total IN: 0 mL    OUT:    Colostomy (mL): 20 mL  Total OUT: 20 mL    Total NET: -20 mL        General: alert and oriented, NAD  Resp: airway patent, respirations unlabored  CVS: regular rate and rhythm  Abdomen: soft, nontender, nondistended, LLQ colostomy pink and viable with gas and liquid stool in bag, no bleeding  Extremities: no edema  Skin: warm, dry, jaundiced                          7.9    10.07 )-----------( 63       ( 22 Apr 2024 03:05 )             23.0   04-22    132<L>  |  97<L>  |  23  ----------------------------<  107<H>  3.7   |  19<L>  |  1.02    Ca    8.0<L>      22 Apr 2024 03:05  Phos  3.0     04-22  Mg     1.30     04-22    TPro  5.9<L>  /  Alb  2.7<L>  /  TBili  10.2<H>  /  DBili  x   /  AST  130<H>  /  ALT  75<H>  /  AlkPhos  612<H>  04-22    < from: MR Abdomen w/wo IV Cont (04.22.24 @ 13:38) >  FINDINGS:  LOWER CHEST: Small bilateral pleural effusions.    LIVER: Mild intrahepatic biliary ductal dilatation.  BILE DUCTS: Normal caliber.  GALLBLADDER: Cholelithiasis.  SPLEEN: Splenomegaly. Small splenic infarcts.  PANCREAS: Within normal limits.  ADRENALS: Within normal limits.  KIDNEYS/URETERS: Bilateral urothelial inflammation along the renal hilum   causing bilateral mild hydronephrosis.    VISUALIZED PORTIONS:  BOWEL: Within normal limits.  PERITONEUM: Small volume perihepatic ascites.  VESSELS: Atherosclerotic changes.  RETROPERITONEUM/LYMPH NODES: No lymphadenopathy.  ABDOMINAL WALL: Within normal limits.  BONES: Old right posterior rib fractures. Diffuse low signal of the   marrow.    IMPRESSION:  Mild intrahepatic biliary ductal dilatation. No extrahepatic biliary   ductal dilatation or choledocholithiasis. Bilateral urothelial   inflammation along the renal hilum causing bilateral mild hydronephrosis.   Consider systemic infiltrating process, such as IgG for disease.    Splenomegaly. Diffuse low signal of the marrow. Consider lymphoma.    < end of copied text >

## 2024-04-23 NOTE — PROGRESS NOTE ADULT - ASSESSMENT
64 y/o male with a history of prostate cancer s/p  chemo/radiation 8 years ago and sigmoid diverticulitis/colovesical fistula who is s/p Hewitt's procedure on  08/2016,  s/p 2 aborted reversal attempts in 11/2016 by Dr. Parkinson and again 06/29/2017 at [facility] by Dr. Oconnell due to severe pelvic fibrosis/scar tissue,Pt saw Dr Tan Vargas on 02/28 for possible repair of parastomal hernia. Outpatient CT findings of  new periportal edema/thickening, splenomegaly and bilateral diffuse infiltrative soft tissue throughout collecting system and kidneys, findings concerning for possible lymphoma. Admitted for workup of painless jaundice. US 4/17 showing biliary ductal dilatation, cholelithiasis, possible debris in CBD. Now s/p EGD with EUS with GI on 4/19 showing periportal edema, no obvious masses or choledocholithiasis.  Liver duplex 4/20 unrevealing w/patent vasculature. MR Abdomen 4/22 showing intrahepatic ductal dilatation, no extrahepatic ductal dilatation, no choledocholithiasis, bilateral urothelial inflammation, splenomegaly, possible systemic process.    PLAN:  - No acute surgical intervention  - Appreciate GI, Hepatology input  - Rest of care per primary team    E-Team surgery  [id number]   62 y/o male with a history of prostate cancer s/p  chemo/radiation 8 years ago and sigmoid diverticulitis/colovesical fistula who is s/p Hewitt's procedure on  08/2016,  s/p 2 aborted reversal attempts in 11/2016 by Dr. Parkinson and again 06/29/2017 at [facility] by Dr. Oconnell due to severe pelvic fibrosis/scar tissue,Pt saw Dr Tan Vargas on 02/28 for possible repair of parastomal hernia. Outpatient CT findings of  new periportal edema/thickening, splenomegaly and bilateral diffuse infiltrative soft tissue throughout collecting system and kidneys, findings concerning for possible lymphoma. Admitted for workup of painless jaundice. US 4/17 showing biliary ductal dilatation, cholelithiasis, possible debris in CBD. Now s/p EGD with EUS with GI on 4/19 showing periportal edema, no obvious masses or choledocholithiasis.  Liver duplex 4/20 unrevealing w/patent vasculature. MR Abdomen 4/22 showing intrahepatic ductal dilatation, no extrahepatic ductal dilatation, no choledocholithiasis, bilateral urothelial inflammation, splenomegaly, possible systemic process.    PLAN:  - No acute surgical intervention  - consider oncology consult for findings concerning for lymphoma on MRI.  - Appreciate GI, Hepatology input  - Rest of care per primary team    E-Team surgery  [id number]   62 y/o male with a history of prostate cancer s/p  chemo/radiation 8 years ago and sigmoid diverticulitis/colovesical fistula who is s/p Hewitt's procedure on  08/2016,  s/p 2 aborted reversal attempts in 11/2016 by Dr. Parkinson and again 06/29/2017 at [facility] by Dr. Oconnell due to severe pelvic fibrosis/scar tissue,Pt saw Dr Tan Vargas on 02/28 for possible repair of parastomal hernia. Outpatient CT findings of  new periportal edema/thickening, splenomegaly and bilateral diffuse infiltrative soft tissue throughout collecting system and kidneys, findings concerning for possible lymphoma. Admitted for workup of painless jaundice. US 4/17 showing biliary ductal dilatation, cholelithiasis, possible debris in CBD. Now s/p EGD with EUS with GI on 4/19 showing periportal edema, no obvious masses or choledocholithiasis.  Liver duplex 4/20 unrevealing w/patent vasculature. MR Abdomen 4/22 showing intrahepatic ductal dilatation, no extrahepatic ductal dilatation, no choledocholithiasis, bilateral urothelial inflammation, splenomegaly, possible systemic process.    PLAN:  - No acute surgical intervention  - consider hematology and oncology consult for findings concerning for lymphoma on MRI.  - Appreciate GI, Hepatology input  - Rest of care per primary team    E-Team surgery  [id number]

## 2024-04-23 NOTE — PROGRESS NOTE ADULT - ASSESSMENT
Mr. Rawls is a 63 year old male with prostate ca s/p chemo RT 8 years PTA, sigmoid diverticulitis/colovesicular fistula s/p Mati's (2016) with 2 aborted reversal attempts (11/2016, 6/2017) due to severe pelvic fibrosis/scar tissue, s/p colonoscopy (2018) without significant findings, parastomal hernia c/b skin excoriation and referred to Dr. Ramos for repair c/b peristomal bleeding. During pre-op evaluation, CT 1/2024 noted to have new extensive new infiltrative soft tissue within the liver along the portal triads, and within both kidneys, with splenomegaly and abnormal femoral marrow, findings concerning for possible lymphoma. Patient is admitted for evaluation of jaundice.     #Jaundice  #Hepatosplenomegaly  #Intrahepatic ductal dilation   #Portal hypertensive gastropathy  Patient with jaundice and predominantly direct bilirubinemia (T bili 7.8, D Bili 6.9) and abnormal liver chemistry in a predominantly cholestatic pattern of injury (ALP 600s, AST 100s, ALT 50s-80s). CT demonstrated mild hepatomegaly, linear hypodensities extending along the portal triads representing either periportal edema or intrahepatic biliary ductal dilatation with soft tissue infiltration, normal extrahepatic biliary tree, splenomegaly (17 cm) with question of small splenic infarcts, mild lymphadenopathy in the abdomen/pelvis, neoplastic vs inflammatory process of the kidneys and irregularly appearing prostate gland for which recurrent disease is possible. Patient underwent EGD/EUS on 4/19 with findings of PHG normal bile duct with wall-thickening but no evidence of stones or mass, one enlarged peripancreatic LN a/w pancreatic parenchymal abnormalities. Hepatic doppler US demonstrates patent hepatic and portal veins with appropriately directed flow. Clinical presentation is concerning for infiltrative process with hepatic involvement and non-cirrhotic portal hypertension. No known history of liver disease. Total IgG normal. Acute viral hepatitis panel negative. MRI demonstrates mild intrahepatic biliary ductal dilatation without extrahepatic biliary ductal dilatation or choledocholithiasis. Bilateral urothelial inflammation is noted  causing bilateral mild hydronephrosis. Based on findings, consider systemic infiltrating process, such as IgG-4 disease. Splenomegaly c/f lymphoma.     Recommendations:  - Consult IR for liver biopsy  - F/u ASMA  - Trend CBC, CMP and PT/INR daily    Venkatesh Villa MD  Gastroenterology/Hepatology Fellow  Available via [employer] Teams  Pager: [phone]    On Weekdays after 5 PM to 8AM and Weekends/Holidays (All day)  For non-urgent consults, please email [email or web address] or [email or web address]  For urgent consults, please contact on call GI team.  See Amion schedule ([facility]), [employer] system - [phone] ([facility]), or call hospital  ([facility]) Mr. Rawls is a 63 year old male with prostate ca s/p chemo RT 8 years PTA, sigmoid diverticulitis/colovesicular fistula s/p Mati's (2016) with 2 aborted reversal attempts (11/2016, 6/2017) due to severe pelvic fibrosis/scar tissue, s/p colonoscopy (2018) without significant findings, parastomal hernia c/b skin excoriation and referred to Dr. Ramos for repair c/b peristomal bleeding. During pre-op evaluation, CT 1/2024 noted to have new extensive new infiltrative soft tissue within the liver along the portal triads, and within both kidneys, with splenomegaly and abnormal femoral marrow, findings concerning for possible lymphoma. Patient is admitted for evaluation of jaundice.     #Jaundice  #Hepatosplenomegaly  #Intrahepatic ductal dilation   #Portal hypertensive gastropathy  Patient with jaundice and predominantly direct bilirubinemia (T bili 7.8, D Bili 6.9) and abnormal liver chemistry in a predominantly cholestatic pattern of injury (ALP 600s, AST 100s, ALT 50s-80s). CT demonstrated mild hepatomegaly, linear hypodensities extending along the portal triads representing either periportal edema or intrahepatic biliary ductal dilatation with soft tissue infiltration, normal extrahepatic biliary tree, splenomegaly (17 cm) with question of small splenic infarcts, mild lymphadenopathy in the abdomen/pelvis, neoplastic vs inflammatory process of the kidneys and irregularly appearing prostate gland for which recurrent disease is possible. Patient underwent EGD/EUS on 4/19 with findings of PHG normal bile duct with wall-thickening but no evidence of stones or mass, one enlarged peripancreatic LN a/w pancreatic parenchymal abnormalities. Hepatic doppler US demonstrates patent hepatic and portal veins with appropriately directed flow. Clinical presentation is concerning for infiltrative process with hepatic involvement and non-cirrhotic portal hypertension. No known history of liver disease. Total IgG normal. Acute viral hepatitis panel negative. MRI demonstrates mild intrahepatic biliary ductal dilatation without extrahepatic biliary ductal dilatation or choledocholithiasis. Bilateral urothelial inflammation is noted  causing bilateral mild hydronephrosis. Based on findings, consider systemic infiltrating process, such as IgG-4 disease. Splenomegaly c/f lymphoma.     Recommendations:  - Consult IR for liver biopsy  - F/u ASMA  - Trend CBC, CMP and PT/INR daily  - Remainder per primary     Venkatesh Villa MD  Gastroenterology/Hepatology Fellow  Available via Microsoft Teams  Pager: [phone]    On Weekdays after 5 PM to 8AM and Weekends/Holidays (All day)  For non-urgent consults, please email [email or web address] or [email or web address]  For urgent consults, please contact on call GI team.  See Amion schedule ([facility]), [employer] paging system - [phone] ([facility]), or call hospital  ([facility]) Mr. Rawls is a 63 year old male with prostate ca s/p chemo RT 8 years PTA, sigmoid diverticulitis/colovesicular fistula s/p Mati's (2016) with 2 aborted reversal attempts (11/2016, 6/2017) due to severe pelvic fibrosis/scar tissue, s/p colonoscopy (2018) without significant findings, parastomal hernia c/b skin excoriation and referred to Dr. Ramos for repair c/b peristomal bleeding. During pre-op evaluation, CT 1/2024 noted to have new extensive new infiltrative soft tissue within the liver along the portal triads, and within both kidneys, with splenomegaly and abnormal femoral marrow, findings concerning for possible lymphoma. Patient is admitted for evaluation of jaundice.     #Jaundice  #Hepatosplenomegaly  #Intrahepatic ductal dilation   #Portal hypertensive gastropathy  Patient with jaundice and predominantly direct bilirubinemia (T bili 7.8, D Bili 6.9) and abnormal liver chemistry in a predominantly cholestatic pattern of injury (ALP 600s, AST 100s, ALT 50s-80s). CT demonstrated mild hepatomegaly, linear hypodensities extending along the portal triads representing either periportal edema or intrahepatic biliary ductal dilatation with soft tissue infiltration, normal extrahepatic biliary tree, splenomegaly (17 cm) with question of small splenic infarcts, mild lymphadenopathy in the abdomen/pelvis, neoplastic vs inflammatory process of the kidneys and irregularly appearing prostate gland for which recurrent disease is possible. Patient underwent EGD/EUS on 4/19 with findings of PHG normal bile duct with wall-thickening but no evidence of stones or mass, one enlarged peripancreatic LN a/w pancreatic parenchymal abnormalities. Hepatic doppler US demonstrates patent hepatic and portal veins with appropriately directed flow. Clinical presentation is concerning for infiltrative process with hepatic involvement and non-cirrhotic portal hypertension. No known history of liver disease. Total IgG normal. Acute viral hepatitis panel negative. MRI demonstrates mild intrahepatic biliary ductal dilatation without extrahepatic biliary ductal dilatation or choledocholithiasis. Bilateral urothelial inflammation is noted  causing bilateral mild hydronephrosis. Based on findings, consider systemic infiltrating process, such as IgG-4 disease. Splenomegaly c/f lymphoma.     Recommendations:  - Consult IR for liver biopsy  - Consider Hematology/Oncology evaluation due to concern for lymphoma   - F/u ASMA  - Trend CBC, CMP and PT/INR daily  - Remainder per primary     Venkatesh Villa MD  Gastroenterology/Hepatology Fellow  Available via [employer] Teams  Pager: [phone]    On Weekdays after 5 PM to 8AM and Weekends/Holidays (All day)  For non-urgent consults, please email [email or web address] or [email or web address]  For urgent consults, please contact on call GI team.  See Amion schedule ([facility]), [email or web address] system - [phone] ([facility]), or call hospital  ([facility])

## 2024-04-23 NOTE — CHART NOTE - NSCHARTNOTEFT_GEN_A_CORE
PRE-INTERVENTIONAL RADIOLOGY PROCEDURE NOTE      Patient Age: 64y    Patient Gender: Male    Procedure: CT-guided biopsy of R Iliac chain lymph node    Diagnosis/Indication: Jaundice, infiltrative soft tissue process, prior prostate cancer    Interventional Radiology Attending Physician: Dr. Robles    Ordering Attending Physician: Dr. Bender    Pertinent Medical History: Prior prostate cancer, diverticulitis s/p Shelley's with failed reversal, thrombocytopenia    Pertinent labs:                      8.0    8.67  )-----------( 59       ( 23 Apr 2024 06:47 )             22.9       04-23    133<L>  |  98  |  27<H>  ----------------------------<  117<H>  3.6   |  21<L>  |  1.02    Ca    8.6      23 Apr 2024 06:47  Phos  3.5     04-23  Mg     1.20     04-23    TPro  6.0  /  Alb  2.7<L>  /  TBili  10.6<H>  /  DBili  x   /  AST  103<H>  /  ALT  75<H>  /  AlkPhos  592<H>  04-23      PT/INR - ( 22 Apr 2024 03:05 )   PT: 13.8 sec;   INR: 1.23 ratio         PTT - ( 22 Apr 2024 03:05 )  PTT:33.8 sec        Patient and Family Aware ? Yes

## 2024-04-24 LAB
ADD ON TEST-SPECIMEN IN LAB: SIGNIFICANT CHANGE UP
ALBUMIN SERPL ELPH-MCNC: 2.6 G/DL — LOW (ref 3.3–5)
ALP SERPL-CCNC: 656 U/L — HIGH (ref 40–120)
ALT FLD-CCNC: 84 U/L — HIGH (ref 4–41)
ANA TITR SER: NEGATIVE — SIGNIFICANT CHANGE UP
ANION GAP SERPL CALC-SCNC: 14 MMOL/L — SIGNIFICANT CHANGE UP (ref 7–14)
APTT BLD: 35.1 SEC — SIGNIFICANT CHANGE UP (ref 24.5–35.6)
AST SERPL-CCNC: 133 U/L — HIGH (ref 4–40)
BASOPHILS # BLD AUTO: 0 K/UL — SIGNIFICANT CHANGE UP (ref 0–0.2)
BASOPHILS NFR BLD AUTO: 0 % — SIGNIFICANT CHANGE UP (ref 0–2)
BILIRUB SERPL-MCNC: 9.3 MG/DL — HIGH (ref 0.2–1.2)
BUN SERPL-MCNC: 35 MG/DL — HIGH (ref 7–23)
CALCIUM SERPL-MCNC: 8.3 MG/DL — LOW (ref 8.4–10.5)
CHLORIDE SERPL-SCNC: 98 MMOL/L — SIGNIFICANT CHANGE UP (ref 98–107)
CO2 SERPL-SCNC: 21 MMOL/L — LOW (ref 22–31)
CREAT SERPL-MCNC: 1.05 MG/DL — SIGNIFICANT CHANGE UP (ref 0.5–1.3)
EGFR: 79 ML/MIN/1.73M2 — SIGNIFICANT CHANGE UP
EOSINOPHIL # BLD AUTO: 0.14 K/UL — SIGNIFICANT CHANGE UP (ref 0–0.5)
EOSINOPHIL NFR BLD AUTO: 1.7 % — SIGNIFICANT CHANGE UP (ref 0–6)
GLUCOSE BLDC GLUCOMTR-MCNC: 152 MG/DL — HIGH (ref 70–99)
GLUCOSE BLDC GLUCOMTR-MCNC: 155 MG/DL — HIGH (ref 70–99)
GLUCOSE BLDC GLUCOMTR-MCNC: 172 MG/DL — HIGH (ref 70–99)
GLUCOSE BLDC GLUCOMTR-MCNC: 173 MG/DL — HIGH (ref 70–99)
GLUCOSE SERPL-MCNC: 123 MG/DL — HIGH (ref 70–99)
HCT VFR BLD CALC: 22.8 % — LOW (ref 39–50)
HGB BLD-MCNC: 8 G/DL — LOW (ref 13–17)
IANC: 4.77 K/UL — SIGNIFICANT CHANGE UP (ref 1.8–7.4)
INR BLD: 1.21 RATIO — HIGH (ref 0.85–1.18)
LDH SERPL L TO P-CCNC: 194 U/L — SIGNIFICANT CHANGE UP (ref 135–225)
LYMPHOCYTES # BLD AUTO: 2.02 K/UL — SIGNIFICANT CHANGE UP (ref 1–3.3)
LYMPHOCYTES # BLD AUTO: 24.6 % — SIGNIFICANT CHANGE UP (ref 13–44)
MAGNESIUM SERPL-MCNC: 1.5 MG/DL — LOW (ref 1.6–2.6)
MCHC RBC-ENTMCNC: 33.1 PG — SIGNIFICANT CHANGE UP (ref 27–34)
MCHC RBC-ENTMCNC: 35.1 GM/DL — SIGNIFICANT CHANGE UP (ref 32–36)
MCV RBC AUTO: 94.2 FL — SIGNIFICANT CHANGE UP (ref 80–100)
MONOCYTES # BLD AUTO: 0.86 K/UL — SIGNIFICANT CHANGE UP (ref 0–0.9)
MONOCYTES NFR BLD AUTO: 10.5 % — SIGNIFICANT CHANGE UP (ref 2–14)
NEUTROPHILS # BLD AUTO: 4.54 K/UL — SIGNIFICANT CHANGE UP (ref 1.8–7.4)
NEUTROPHILS NFR BLD AUTO: 55.3 % — SIGNIFICANT CHANGE UP (ref 43–77)
PHOSPHATE SERPL-MCNC: 3.4 MG/DL — SIGNIFICANT CHANGE UP (ref 2.5–4.5)
PLATELET # BLD AUTO: 62 K/UL — LOW (ref 150–400)
POTASSIUM SERPL-MCNC: 3.5 MMOL/L — SIGNIFICANT CHANGE UP (ref 3.5–5.3)
POTASSIUM SERPL-SCNC: 3.5 MMOL/L — SIGNIFICANT CHANGE UP (ref 3.5–5.3)
PROT SERPL-MCNC: 6.1 G/DL — SIGNIFICANT CHANGE UP (ref 6–8.3)
PROTHROM AB SERPL-ACNC: 13.6 SEC — HIGH (ref 9.5–13)
RBC # BLD: 2.42 M/UL — LOW (ref 4.2–5.8)
RBC # FLD: 25.2 % — HIGH (ref 10.3–14.5)
SODIUM SERPL-SCNC: 133 MMOL/L — LOW (ref 135–145)
WBC # BLD: 8.21 K/UL — SIGNIFICANT CHANGE UP (ref 3.8–10.5)
WBC # FLD AUTO: 8.21 K/UL — SIGNIFICANT CHANGE UP (ref 3.8–10.5)

## 2024-04-24 PROCEDURE — 99232 SBSQ HOSP IP/OBS MODERATE 35: CPT | Mod: GC

## 2024-04-24 RX ORDER — MAGNESIUM SULFATE 500 MG/ML
2 VIAL (ML) INJECTION ONCE
Refills: 0 | Status: COMPLETED | OUTPATIENT
Start: 2024-04-24 | End: 2024-04-24

## 2024-04-24 RX ADMIN — Medication 25 GRAM(S): at 06:19

## 2024-04-24 RX ADMIN — Medication 100 MILLIGRAM(S): at 12:49

## 2024-04-24 RX ADMIN — Medication 1 TABLET(S): at 12:45

## 2024-04-24 RX ADMIN — ATORVASTATIN CALCIUM 20 MILLIGRAM(S): 80 TABLET, FILM COATED ORAL at 22:53

## 2024-04-24 RX ADMIN — Medication 1: at 13:18

## 2024-04-24 RX ADMIN — LOSARTAN POTASSIUM 100 MILLIGRAM(S): 100 TABLET, FILM COATED ORAL at 06:17

## 2024-04-24 RX ADMIN — Medication 1: at 18:42

## 2024-04-24 RX ADMIN — CHLORHEXIDINE GLUCONATE 1 APPLICATION(S): 213 SOLUTION TOPICAL at 12:47

## 2024-04-24 NOTE — PROGRESS NOTE ADULT - SUBJECTIVE AND OBJECTIVE BOX
Tommy Sparks, PGY-1  Please contact on Teams    JR ARIELA  64y  Male    Reason for Admission:     SUBJECTIVE/INTERVAL EVENTS: No acute events. Pt seen and examined at bedside.    Physical Exam:   General: NAD, jaundiced  HEENT: PERRL, EOMI  Neck: Supple, no JVD  Lungs: CTA bilaterally  Heart: RRR, normal S1S2, no murmurs/rubs/gallops  Abdomen: Soft, ND/NT  Extremities: 2+ peripheral pulses, no cyanosis/clubbing/edema, full ROM  Skin: Warm, well-perfused  Neuro: A&O x3, no focal deficits      Vital Signs Last 24 Hrs  T(C): 36.8 (24 Apr 2024 05:56), Max: 36.8 (23 Apr 2024 15:29)  T(F): 98.3 (24 Apr 2024 05:56), Max: 98.3 (24 Apr 2024 05:56)  HR: 94 (24 Apr 2024 05:56) (86 - 94)  BP: 113/63 (24 Apr 2024 05:56) (113/63 - 128/72)  BP(mean): --  RR: 18 (24 Apr 2024 05:56) (18 - 19)  SpO2: 100% (24 Apr 2024 05:56) (98% - 100%)    Parameters below as of 24 Apr 2024 05:56  Patient On (Oxygen Delivery Method): room air          Consultant(s) Notes Reviewed:  [x] YES  [ ] NO  Care Discussed with Consultants/Other Providers [x] YES  [ ] NO    LABS:                        8.0    8.21  )-----------( 62       ( 24 Apr 2024 03:00 )             22.8                         8.0    8.67  )-----------( 59       ( 23 Apr 2024 06:47 )             22.9                         7.9    10.07 )-----------( 63       ( 22 Apr 2024 03:05 )             23.0                               133    |  98     |  35                  Calcium: 8.3   / iCa: x      (04-24 @ 03:00)    ----------------------------<  123       Magnesium: 1.50                             3.5     |  21     |  1.05             Phosphorous: 3.4      TPro  6.1    /  Alb  2.6    /  TBili  9.3    /  DBili  x      /  AST  133    /  ALT  84     /  AlkPhos  656    24 Apr 2024 03:00    Urinalysis Basic - ( 24 Apr 2024 03:00 )    Color: x / Appearance: x / SG: x / pH: x  Gluc: 123 mg/dL / Ketone: x  / Bili: x / Urobili: x   Blood: x / Protein: x / Nitrite: x   Leuk Esterase: x / RBC: x / WBC x   Sq Epi: x / Non Sq Epi: x / Bacteria: x        RADIOLOGY & ADDITIONAL TESTS:    Imaging Personally Reviewed:  [x] YES  [ ] NO    MEDICATIONS  (STANDING):  allopurinol 100 milliGRAM(s) Oral daily  atorvastatin 20 milliGRAM(s) Oral at bedtime  chlorhexidine 2% Cloths 1 Application(s) Topical daily  dextrose 10% Bolus 125 milliLiter(s) IV Bolus once  dextrose 5%. 1000 milliLiter(s) (50 mL/Hr) IV Continuous <Continuous>  dextrose 5%. 1000 milliLiter(s) (100 mL/Hr) IV Continuous <Continuous>  dextrose 50% Injectable 25 Gram(s) IV Push once  dextrose 50% Injectable 25 Gram(s) IV Push once  dextrose 50% Injectable 12.5 Gram(s) IV Push once  dextrose Oral Gel 15 Gram(s) Oral once  glucagon  Injectable 1 milliGRAM(s) IntraMuscular once  hydrochlorothiazide 25 milliGRAM(s) Oral daily  influenza  Vaccine (HIGH DOSE) 0.7 milliLiter(s) IntraMuscular once  insulin lispro (ADMELOG) corrective regimen sliding scale   SubCutaneous three times a day before meals  insulin lispro (ADMELOG) corrective regimen sliding scale   SubCutaneous at bedtime  losartan 100 milliGRAM(s) Oral daily  multivitamin 1 Tablet(s) Oral daily  silver nitrate Applicator 1 Application(s) Topical once    MEDICATIONS  (PRN):  acetaminophen     Tablet .. 650 milliGRAM(s) Oral every 6 hours PRN Temp greater or equal to 38C (100.4F), Mild Pain (1 - 3)  melatonin 3 milliGRAM(s) Oral at bedtime PRN Insomnia  ondansetron Injectable 4 milliGRAM(s) IV Push every 8 hours PRN Nausea and/or Vomiting      HEALTH ISSUES - PROBLEM Dx:  Painless jaundice    Anemia due to acute blood loss    Skin excoriation    Prophylactic measure    DM (diabetes mellitus), type 2

## 2024-04-24 NOTE — DIETITIAN INITIAL EVALUATION ADULT - NSFNSGIIOFT_GEN_A_CORE
04-23-24 @ 07:01  -  04-24-24 @ 07:00  --------------------------------------------------------  OUT:    Colostomy (mL): 600 mL  Total OUT: 600 mL    Total NET: -600 mL

## 2024-04-24 NOTE — DIETITIAN INITIAL EVALUATION ADULT - PERTINENT LABORATORY DATA
04-24    133<L>  |  98  |  35<H>  ----------------------------<  123<H>  3.5   |  21<L>  |  1.05    Ca    8.3<L>      24 Apr 2024 03:00  Phos  3.4     04-24  Mg     1.50     04-24    TPro  6.1  /  Alb  2.6<L>  /  TBili  9.3<H>  /  DBili  x   /  AST  133<H>  /  ALT  84<H>  /  AlkPhos  656<H>  04-24  POCT Blood Glucose.: 155 mg/dL (04-24-24 @ 12:16)  A1C with Estimated Average Glucose Result: 4.7 % (04-18-24 @ 02:05)

## 2024-04-24 NOTE — PROGRESS NOTE ADULT - SUBJECTIVE AND OBJECTIVE BOX
SURGICAL ONCOLOGY PROGRESS NOTE    S: Patient doing well, no acute complaints. Tolerating regular diet w/gas and liquid stool from colostomy no further bleeding.     O: Vital Signs  T(C): 36.8 (04-24 @ 05:56), Max: 36.8 (04-23 @ 15:29)  HR: 94 (04-24 @ 05:56) (86 - 94)  BP: 113/63 (04-24 @ 05:56) (113/63 - 128/72)  RR: 18 (04-24 @ 05:56) (18 - 19)  SpO2: 100% (04-24 @ 05:56) (98% - 100%)  04-22-24 @ 07:01  -  04-23-24 @ 07:00  --------------------------------------------------------  IN:  Total IN: 0 mL    OUT:    Colostomy (mL): 20 mL    Voided (mL): 600 mL  Total OUT: 620 mL    Total NET: -620 mL      04-23-24 @ 07:01  -  04-24-24 @ 06:35  --------------------------------------------------------  IN:  Total IN: 0 mL    OUT:    Colostomy (mL): 600 mL  Total OUT: 600 mL    Total NET: -600 mL        General: alert and oriented, NAD  Resp: airway patent, respirations unlabored on RA  CVS: regular rate and rhythm  Abdomen: soft, nontender, nondistended, colostomy pink and viable with stool and gas in bag  Extremities: no edema  Skin: warm, dry, jaundiced                          8.0    8.21  )-----------( 62       ( 24 Apr 2024 03:00 )             22.8   04-24    133<L>  |  98  |  35<H>  ----------------------------<  123<H>  3.5   |  21<L>  |  1.05    Ca    8.3<L>      24 Apr 2024 03:00  Phos  3.4     04-24  Mg     1.50     04-24    TPro  6.1  /  Alb  2.6<L>  /  TBili  9.3<H>  /  DBili  x   /  AST  133<H>  /  ALT  84<H>  /  AlkPhos  656<H>  04-24

## 2024-04-24 NOTE — DIETITIAN INITIAL EVALUATION ADULT - NS FNS DIET ORDER
Diet, NPO after Midnight:      NPO Start Date: 24-Apr-2024,   NPO Start Time: 23:59  Except Medications (04-24-24 @ 15:53)

## 2024-04-24 NOTE — DIETITIAN INITIAL EVALUATION ADULT - ADD RECOMMEND
1) Defer diet advancement to provider, once medically cleared consider CSTCHOSN  2) Encourage PO intake once medically feasible and honor food preferences as able.   3) Monitor PO intake once medically feasible, labs, weights, BMs, and skin integrity.

## 2024-04-24 NOTE — PROGRESS NOTE ADULT - PROBLEM SELECTOR PLAN 1
ddx includes choledocholithiasis, sludge, pancreatic mass, infiltrative process noted on CT as outpatient  sono without cholelithiasis, and bile duct dilatation    - CT chest abd pelvis w/ IV contrast - similar to outpatient imaging, infiltrative soft tissue process in kidneys, portal system  - per CT report, findings concerning for lymphoma vs IgG4 disease, GI planning biopsies during ERCP  - SPEP, serum IgG4 neg  - pending lymph node biopsy 4/24 w/ IR  - discussed with heme/onc - they will weigh in once pathology results ddx includes choledocholithiasis, sludge, pancreatic mass, infiltrative process noted on CT as outpatient  sono without cholelithiasis, and bile duct dilatation    - CT chest abd pelvis w/ IV contrast - similar to outpatient imaging, infiltrative soft tissue process in kidneys, portal system  - per CT report, findings concerning for lymphoma vs IgG4 disease  - SPEP, serum IgG4 neg  - pending lymph node biopsy 4/24 w/ IR  - discussed with heme/onc - they will weigh in once pathology results

## 2024-04-24 NOTE — PROGRESS NOTE ADULT - ASSESSMENT
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 Outpatient imaging notable for infiltrative soft tissue process involving the kidneys and liver/portal triad. Planned for IR lymph node biopsy 4/24.

## 2024-04-24 NOTE — PROGRESS NOTE ADULT - SUBJECTIVE AND OBJECTIVE BOX
Interval Events:   No acute events  Patient without complaints  Vitals, exam and labs are stable    Hospital Medications:  acetaminophen     Tablet .. 650 milliGRAM(s) Oral every 6 hours PRN  allopurinol 100 milliGRAM(s) Oral daily  atorvastatin 20 milliGRAM(s) Oral at bedtime  chlorhexidine 2% Cloths 1 Application(s) Topical daily  dextrose 10% Bolus 125 milliLiter(s) IV Bolus once  dextrose 5%. 1000 milliLiter(s) IV Continuous <Continuous>  dextrose 5%. 1000 milliLiter(s) IV Continuous <Continuous>  dextrose 50% Injectable 12.5 Gram(s) IV Push once  dextrose 50% Injectable 25 Gram(s) IV Push once  dextrose 50% Injectable 25 Gram(s) IV Push once  dextrose Oral Gel 15 Gram(s) Oral once  glucagon  Injectable 1 milliGRAM(s) IntraMuscular once  hydrochlorothiazide 25 milliGRAM(s) Oral daily  influenza  Vaccine (HIGH DOSE) 0.7 milliLiter(s) IntraMuscular once  insulin lispro (ADMELOG) corrective regimen sliding scale   SubCutaneous three times a day before meals  insulin lispro (ADMELOG) corrective regimen sliding scale   SubCutaneous at bedtime  losartan 100 milliGRAM(s) Oral daily  melatonin 3 milliGRAM(s) Oral at bedtime PRN  multivitamin 1 Tablet(s) Oral daily  ondansetron Injectable 4 milliGRAM(s) IV Push every 8 hours PRN  silver nitrate Applicator 1 Application(s) Topical once      ROS: See above.    PHYSICAL EXAM:   Vital Signs:  Vital Signs Last 24 Hrs  T(C): 36.8 (24 Apr 2024 05:56), Max: 36.8 (23 Apr 2024 15:29)  T(F): 98.3 (24 Apr 2024 05:56), Max: 98.3 (24 Apr 2024 05:56)  HR: 94 (24 Apr 2024 05:56) (86 - 94)  BP: 113/63 (24 Apr 2024 05:56) (113/63 - 128/72)  BP(mean): --  RR: 18 (24 Apr 2024 05:56) (18 - 19)  SpO2: 100% (24 Apr 2024 05:56) (98% - 100%)    Parameters below as of 24 Apr 2024 05:56  Patient On (Oxygen Delivery Method): room air        GENERAL:  NAD, resting comfortably in bed  HEENT:  sclera icteric  CHEST:  Normal Effort  HEART:  HDS  ABDOMEN:  Soft, non-tender, non-distended. Stoma intact  SKIN:  Warm & Dry. Jaundice  NEURO:  Alert, conversant, no focal deficit    LABS:                        8.0    8.21  )-----------( 62       ( 24 Apr 2024 03:00 )             22.8     Mean Cell Volume: 94.2 fL (04-24-24 @ 03:00)    04-24    133<L>  |  98  |  35<H>  ----------------------------<  123<H>  3.5   |  21<L>  |  1.05    Ca    8.3<L>      24 Apr 2024 03:00  Phos  3.4     04-24  Mg     1.50     04-24    TPro  6.1  /  Alb  2.6<L>  /  TBili  9.3<H>  /  DBili  x   /  AST  133<H>  /  ALT  84<H>  /  AlkPhos  656<H>  04-24    LIVER FUNCTIONS - ( 24 Apr 2024 03:00 )  Alb: 2.6 g/dL / Pro: 6.1 g/dL / ALK PHOS: 656 U/L / ALT: 84 U/L / AST: 133 U/L / GGT: x           PT/INR - ( 24 Apr 2024 03:00 )   PT: 13.6 sec;   INR: 1.21 ratio         PTT - ( 24 Apr 2024 03:00 )  PTT:35.1 sec

## 2024-04-24 NOTE — DIETITIAN INITIAL EVALUATION ADULT - ORAL INTAKE PTA/DIET HISTORY
Spoke to pt at bedside. Noted no food allergies or food intolerances. Does not follow a specific diet at home, does not take any supplements at home. Pt takes tumeric, fish oils and multivitamins at home. Pt has good intake PTA 3 meals a day.

## 2024-04-24 NOTE — DIETITIAN INITIAL EVALUATION ADULT - PERTINENT MEDS FT
MEDICATIONS  (STANDING):  allopurinol 100 milliGRAM(s) Oral daily  atorvastatin 20 milliGRAM(s) Oral at bedtime  chlorhexidine 2% Cloths 1 Application(s) Topical daily  dextrose 10% Bolus 125 milliLiter(s) IV Bolus once  dextrose 5%. 1000 milliLiter(s) (50 mL/Hr) IV Continuous <Continuous>  dextrose 5%. 1000 milliLiter(s) (100 mL/Hr) IV Continuous <Continuous>  dextrose 50% Injectable 12.5 Gram(s) IV Push once  dextrose 50% Injectable 25 Gram(s) IV Push once  dextrose 50% Injectable 25 Gram(s) IV Push once  dextrose Oral Gel 15 Gram(s) Oral once  glucagon  Injectable 1 milliGRAM(s) IntraMuscular once  hydrochlorothiazide 25 milliGRAM(s) Oral daily  influenza  Vaccine (HIGH DOSE) 0.7 milliLiter(s) IntraMuscular once  insulin lispro (ADMELOG) corrective regimen sliding scale   SubCutaneous three times a day before meals  insulin lispro (ADMELOG) corrective regimen sliding scale   SubCutaneous at bedtime  losartan 100 milliGRAM(s) Oral daily  multivitamin 1 Tablet(s) Oral daily  silver nitrate Applicator 1 Application(s) Topical once    MEDICATIONS  (PRN):  acetaminophen     Tablet .. 650 milliGRAM(s) Oral every 6 hours PRN Temp greater or equal to 38C (100.4F), Mild Pain (1 - 3)  melatonin 3 milliGRAM(s) Oral at bedtime PRN Insomnia  ondansetron Injectable 4 milliGRAM(s) IV Push every 8 hours PRN Nausea and/or Vomiting

## 2024-04-24 NOTE — PROGRESS NOTE ADULT - ASSESSMENT
Mr. Rawls is a 63 year old male with prostate ca s/p chemo RT 8 years PTA, sigmoid diverticulitis/colovesicular fistula s/p Mati's (2016) with 2 aborted reversal attempts (11/2016, 6/2017) due to severe pelvic fibrosis/scar tissue, s/p colonoscopy (2018) without significant findings, parastomal hernia c/b skin excoriation and referred to Dr. Ramos for repair c/b peristomal bleeding. During pre-op evaluation, CT 1/2024 noted to have new extensive new infiltrative soft tissue within the liver along the portal triads, and within both kidneys, with splenomegaly and abnormal femoral marrow, findings concerning for possible lymphoma. Patient is admitted for evaluation of jaundice.     #Jaundice  #Hepatosplenomegaly  #Intrahepatic ductal dilation   #Portal hypertensive gastropathy  Patient with jaundice and predominantly direct bilirubinemia (T bili 7.8, D Bili 6.9) and abnormal liver chemistry in a predominantly cholestatic pattern of injury (ALP 600s, AST 100s, ALT 50s-80s). CT demonstrated mild hepatomegaly, linear hypodensities extending along the portal triads representing either periportal edema or intrahepatic biliary ductal dilatation with soft tissue infiltration, normal extrahepatic biliary tree, splenomegaly (17 cm) with question of small splenic infarcts, mild lymphadenopathy in the abdomen/pelvis, neoplastic vs inflammatory process of the kidneys and irregularly appearing prostate gland for which recurrent disease is possible. Patient underwent EGD/EUS on 4/19 with findings of PHG normal bile duct with wall-thickening but no evidence of stones or mass, one enlarged peripancreatic LN a/w pancreatic parenchymal abnormalities. Hepatic doppler US demonstrates patent hepatic and portal veins with appropriately directed flow. Clinical presentation is concerning for infiltrative process with hepatic involvement and non-cirrhotic portal hypertension. No known history of liver disease. Total IgG normal. ASMA weakly positive 1:20, unlikely to represent autoimmune hepatitis. Acute viral hepatitis panel negative. MRI demonstrates mild intrahepatic biliary ductal dilatation without extrahepatic biliary ductal dilatation or choledocholithiasis. Bilateral urothelial inflammation is noted  causing bilateral mild hydronephrosis. Based on findings, consider systemic infiltrating process, such as IgG-4 disease. Splenomegaly c/f lymphoma.     Recommendations:  - IR planning for LN biopsy today. Await pathology.   - Trend CBC, CMP and PT/INR daily  - Remainder per primary     Venkatesh Villa MD  Gastroenterology/Hepatology Fellow  Available via [employer] Teams  Pager: [phone]    On Weekdays after 5 PM to 8AM and Weekends/Holidays (All day)  For non-urgent consults, please email [email or web address] or [email or web address]  For urgent consults, please contact on call GI team.  See Amion schedule ([facility]), [employer] system - [phone] ([facility]), or call hospital  ([facility])

## 2024-04-24 NOTE — DIETITIAN INITIAL EVALUATION ADULT - OTHER INFO
Medical Course: Per internal medicine 4/24 - 63M, hx T2DM, gout, prostate ca treated with chemo/rads, in remission, Shelley's with chronic colostomy due to aborted reversal, presents for painless jaundice and blood loss anemia from colostomy site. Outpatient imaging notable for infiltrative soft tissue process involving the kidneys and liver/portal triad. Planned for IR lymph node biopsy 4/24.    Nutrition interview: No recent episodes of nausea, vomiting, diarrhea or constipation. Last BM noted on 4/23 using colostomy bag per RN flowsheets. Denies any chewing/swallowing difficulties. Intake was % per RN flowsheets and per pt prior to NPO status beginning on 4/23 midnight. Pt stated to eat [employer] brought to him from his family for 3 days prior to his NPO status. Food preferences explored and noted. Stated UBW: 225 lbs, and has remained stable over the past few months. No [facility] data available. POCT ranging from 111 - 225 between 4/17 - 4/24. HbA1c - 4.7% noted on 4/23. Pt currently on insulin regimen. Noted with low sodium (133), calcium (8.3), magnesium (1.5).

## 2024-04-24 NOTE — PROGRESS NOTE ADULT - ASSESSMENT
62 y/o male with a history of prostate cancer s/p  chemo/radiation 8 years ago and sigmoid diverticulitis/colovesical fistula who is s/p Hewitt's procedure on  08/2016,  s/p 2 aborted reversal attempts in 11/2016 by Dr. Parkinson and again 06/29/2017 at [facility] by Dr. Oconnell due to severe pelvic fibrosis/scar tissue,Pt saw Dr Tan Vargas on 02/28 for possible repair of parastomal hernia. Outpatient CT findings of  new periportal edema/thickening, splenomegaly and bilateral diffuse infiltrative soft tissue throughout collecting system and kidneys, findings concerning for possible lymphoma. Admitted for workup of painless jaundice. US 4/17 showing biliary ductal dilatation, cholelithiasis, possible debris in CBD. Now s/p EGD with EUS with GI on 4/19 showing periportal edema, no obvious masses or choledocholithiasis.  Liver duplex 4/20 unrevealing w/patent vasculature. MR Abdomen 4/22 showing intrahepatic ductal dilatation, no extrahepatic ductal dilatation, no choledocholithiasis, bilateral urothelial inflammation, splenomegaly, possible systemic process.    PLAN:  - No acute surgical intervention  - Consider Hematology and Oncology consult for findings concerning for lymphoma on MRI as well as thrombocytopenia  - Appreciate GI, Hepatology input  - IR LN biopsy today given c/f lymphoma on imaging  - Rest of care per primary team    E-Team surgery  [id number]   62 y/o male with a history of prostate cancer s/p  chemo/radiation 8 years ago and sigmoid diverticulitis/colovesical fistula who is s/p Hewitt's procedure on  08/2016,  s/p 2 aborted reversal attempts in 11/2016 by Dr. Parkinson and again 06/29/2017 at [facility] by Dr. Oconnell due to severe pelvic fibrosis/scar tissue,Pt saw Dr Tan Vargas on 02/28 for possible repair of parastomal hernia. Outpatient CT findings of  new periportal edema/thickening, splenomegaly and bilateral diffuse infiltrative soft tissue throughout collecting system and kidneys, findings concerning for possible lymphoma. Admitted for workup of painless jaundice. US 4/17 showing biliary ductal dilatation, cholelithiasis, possible debris in CBD. Now s/p EGD with EUS with GI on 4/19 showing periportal edema, no obvious masses or choledocholithiasis.  Liver duplex 4/20 unrevealing w/patent vasculature. MR Abdomen 4/22 showing intrahepatic ductal dilatation, no extrahepatic ductal dilatation, no choledocholithiasis, bilateral urothelial inflammation, splenomegaly, possible systemic process.    PLAN:  - No acute surgical intervention  - Consider Hematology and Oncology consult for findings concerning for lymphoma on MRI as well as thrombocytopenia  - Appreciate GI, Hepatology input  - IR LN biopsy today given c/f lymphoma on imaging  - Please check LDH w/AM labs  - Rest of care per primary team    E-Team surgery  [id number]

## 2024-04-25 ENCOUNTER — RESULT REVIEW (OUTPATIENT)
Age: 64
End: 2024-04-25

## 2024-04-25 LAB
ALBUMIN SERPL ELPH-MCNC: 2.6 G/DL — LOW (ref 3.3–5)
ALP SERPL-CCNC: 720 U/L — HIGH (ref 40–120)
ALT FLD-CCNC: 103 U/L — HIGH (ref 4–41)
ANION GAP SERPL CALC-SCNC: 14 MMOL/L — SIGNIFICANT CHANGE UP (ref 7–14)
APTT BLD: 37.4 SEC — HIGH (ref 24.5–35.6)
AST SERPL-CCNC: 160 U/L — HIGH (ref 4–40)
BASOPHILS # BLD AUTO: 0.02 K/UL — SIGNIFICANT CHANGE UP (ref 0–0.2)
BASOPHILS NFR BLD AUTO: 0.3 % — SIGNIFICANT CHANGE UP (ref 0–2)
BILIRUB SERPL-MCNC: 9.4 MG/DL — HIGH (ref 0.2–1.2)
BLD GP AB SCN SERPL QL: NEGATIVE — SIGNIFICANT CHANGE UP
BUN SERPL-MCNC: 30 MG/DL — HIGH (ref 7–23)
CALCIUM SERPL-MCNC: 8.5 MG/DL — SIGNIFICANT CHANGE UP (ref 8.4–10.5)
CHLORIDE SERPL-SCNC: 101 MMOL/L — SIGNIFICANT CHANGE UP (ref 98–107)
CLOSURE TME COLL+EPINEP BLD: 56 K/UL — LOW (ref 150–400)
CO2 SERPL-SCNC: 22 MMOL/L — SIGNIFICANT CHANGE UP (ref 22–31)
CREAT SERPL-MCNC: 0.9 MG/DL — SIGNIFICANT CHANGE UP (ref 0.5–1.3)
EGFR: 95 ML/MIN/1.73M2 — SIGNIFICANT CHANGE UP
EOSINOPHIL # BLD AUTO: 0.03 K/UL — SIGNIFICANT CHANGE UP (ref 0–0.5)
EOSINOPHIL NFR BLD AUTO: 0.4 % — SIGNIFICANT CHANGE UP (ref 0–6)
GLUCOSE BLDC GLUCOMTR-MCNC: 126 MG/DL — HIGH (ref 70–99)
GLUCOSE BLDC GLUCOMTR-MCNC: 133 MG/DL — HIGH (ref 70–99)
GLUCOSE BLDC GLUCOMTR-MCNC: 155 MG/DL — HIGH (ref 70–99)
GLUCOSE BLDC GLUCOMTR-MCNC: 167 MG/DL — HIGH (ref 70–99)
GLUCOSE SERPL-MCNC: 110 MG/DL — HIGH (ref 70–99)
HCT VFR BLD CALC: 22.6 % — LOW (ref 39–50)
HGB BLD-MCNC: 8 G/DL — LOW (ref 13–17)
IANC: 4.76 K/UL — SIGNIFICANT CHANGE UP (ref 1.8–7.4)
IMM GRANULOCYTES NFR BLD AUTO: 5.7 % — HIGH (ref 0–0.9)
INR BLD: 1.26 RATIO — HIGH (ref 0.85–1.18)
LYMPHOCYTES # BLD AUTO: 1.53 K/UL — SIGNIFICANT CHANGE UP (ref 1–3.3)
LYMPHOCYTES # BLD AUTO: 19.5 % — SIGNIFICANT CHANGE UP (ref 13–44)
MAGNESIUM SERPL-MCNC: 1.3 MG/DL — LOW (ref 1.6–2.6)
MCHC RBC-ENTMCNC: 33.2 PG — SIGNIFICANT CHANGE UP (ref 27–34)
MCHC RBC-ENTMCNC: 35.4 GM/DL — SIGNIFICANT CHANGE UP (ref 32–36)
MCV RBC AUTO: 93.8 FL — SIGNIFICANT CHANGE UP (ref 80–100)
MONOCYTES # BLD AUTO: 1.06 K/UL — HIGH (ref 0–0.9)
MONOCYTES NFR BLD AUTO: 13.5 % — SIGNIFICANT CHANGE UP (ref 2–14)
NEUTROPHILS # BLD AUTO: 4.76 K/UL — SIGNIFICANT CHANGE UP (ref 1.8–7.4)
NEUTROPHILS NFR BLD AUTO: 60.6 % — SIGNIFICANT CHANGE UP (ref 43–77)
NRBC # BLD: 0 /100 WBCS — SIGNIFICANT CHANGE UP (ref 0–0)
NRBC # FLD: 0 K/UL — SIGNIFICANT CHANGE UP (ref 0–0)
PHOSPHATE SERPL-MCNC: 3.3 MG/DL — SIGNIFICANT CHANGE UP (ref 2.5–4.5)
PLATELET # BLD AUTO: 64 K/UL — LOW (ref 150–400)
POTASSIUM SERPL-MCNC: 3.9 MMOL/L — SIGNIFICANT CHANGE UP (ref 3.5–5.3)
POTASSIUM SERPL-SCNC: 3.9 MMOL/L — SIGNIFICANT CHANGE UP (ref 3.5–5.3)
PROT SERPL-MCNC: 6.3 G/DL — SIGNIFICANT CHANGE UP (ref 6–8.3)
PROTHROM AB SERPL-ACNC: 14 SEC — HIGH (ref 9.5–13)
RBC # BLD: 2.41 M/UL — LOW (ref 4.2–5.8)
RBC # FLD: 25.2 % — HIGH (ref 10.3–14.5)
RH IG SCN BLD-IMP: POSITIVE — SIGNIFICANT CHANGE UP
SODIUM SERPL-SCNC: 137 MMOL/L — SIGNIFICANT CHANGE UP (ref 135–145)
WBC # BLD: 7.85 K/UL — SIGNIFICANT CHANGE UP (ref 3.8–10.5)
WBC # FLD AUTO: 7.85 K/UL — SIGNIFICANT CHANGE UP (ref 3.8–10.5)

## 2024-04-25 PROCEDURE — 99232 SBSQ HOSP IP/OBS MODERATE 35: CPT | Mod: GC

## 2024-04-25 PROCEDURE — 88173 CYTOPATH EVAL FNA REPORT: CPT | Mod: 26

## 2024-04-25 PROCEDURE — 38505 NEEDLE BIOPSY LYMPH NODES: CPT

## 2024-04-25 PROCEDURE — 88342 IMHCHEM/IMCYTCHM 1ST ANTB: CPT | Mod: 26

## 2024-04-25 PROCEDURE — 88333 PATH CONSLTJ SURG CYTO XM 1: CPT | Mod: 26,59

## 2024-04-25 PROCEDURE — 88360 TUMOR IMMUNOHISTOCHEM/MANUAL: CPT | Mod: 26,59

## 2024-04-25 PROCEDURE — 99232 SBSQ HOSP IP/OBS MODERATE 35: CPT

## 2024-04-25 PROCEDURE — 88307 TISSUE EXAM BY PATHOLOGIST: CPT | Mod: 26

## 2024-04-25 PROCEDURE — 77012 CT SCAN FOR NEEDLE BIOPSY: CPT | Mod: 26

## 2024-04-25 PROCEDURE — 88341 IMHCHEM/IMCYTCHM EA ADD ANTB: CPT | Mod: 26

## 2024-04-25 RX ORDER — MAGNESIUM SULFATE 500 MG/ML
2 VIAL (ML) INJECTION ONCE
Refills: 0 | Status: COMPLETED | OUTPATIENT
Start: 2024-04-25 | End: 2024-04-25

## 2024-04-25 RX ADMIN — CHLORHEXIDINE GLUCONATE 1 APPLICATION(S): 213 SOLUTION TOPICAL at 12:51

## 2024-04-25 RX ADMIN — ATORVASTATIN CALCIUM 20 MILLIGRAM(S): 80 TABLET, FILM COATED ORAL at 23:02

## 2024-04-25 RX ADMIN — Medication 25 GRAM(S): at 05:47

## 2024-04-25 RX ADMIN — Medication 1: at 12:56

## 2024-04-25 RX ADMIN — Medication 1: at 18:36

## 2024-04-25 RX ADMIN — Medication 1 TABLET(S): at 12:50

## 2024-04-25 RX ADMIN — Medication 100 MILLIGRAM(S): at 12:50

## 2024-04-25 RX ADMIN — LOSARTAN POTASSIUM 100 MILLIGRAM(S): 100 TABLET, FILM COATED ORAL at 05:48

## 2024-04-25 NOTE — PROCEDURE NOTE - NSINFORMCONSENT_GEN_A_CORE
[id number]
Benefits, risks, and possible complications of procedure explained to patient/caregiver who verbalized understanding and gave written consent.

## 2024-04-25 NOTE — PROGRESS NOTE ADULT - PROBLEM SELECTOR PLAN 2
likely related to bleeding from excoriated skin at stoma  possibly multifactorial with folate/b12 deficiency given macrocytosis, vs reticulocytosis from blood loss    - iron, b12, folate ok, macrocytosis likely 2/2 reticulocytosis  - resumed bleeding from stoma site, surgery consulted, bleeding controlled with silver nitrate  - thrombocytopenia likely related to splenomegaly  - PLT goal > 50 given recurrent bleeding from stoma site, Hgb goal > 7  - maintain active T&S

## 2024-04-25 NOTE — PROCEDURE NOTE - PROCEDURE FINDINGS AND DETAILS
- Mildly enlarged right iliac chain LN biopsied.  - Core x3 obtained and deemed adequate by the cytopathology technologist in attendance.  - Official report to follow.

## 2024-04-25 NOTE — PROGRESS NOTE ADULT - SUBJECTIVE AND OBJECTIVE BOX
SURGICAL ONCOLOGY PROGRESS NOTE    S: Patient seen and examined. No acute events overnight. IR biopsy postponed to today. Patient had one episode of bleeding from stoma site which resolved with manual pressure. No acute complaints. Tolerating regular diet w/gas and stool from colostomy.    O: Vital Signs  T(C): 37 (04-25 @ 05:26), Max: 37.1 (04-24 @ 14:13)  HR: 91 (04-25 @ 05:26) (85 - 91)  BP: 108/62 (04-25 @ 05:26) (108/62 - 116/61)  RR: 18 (04-25 @ 05:26) (17 - 18)  SpO2: 100% (04-25 @ 05:26) (97% - 100%)  04-24-24 @ 07:01  -  04-25-24 @ 07:00  --------------------------------------------------------  IN:  Total IN: 0 mL    OUT:    Colostomy (mL): 400 mL  Total OUT: 400 mL    Total NET: -400 mL        General: alert and oriented, NAD  Resp: airway patent, respirations unlabored  CVS: regular rate and rhythm  Abdomen: soft, nontender, nondistended, colostomy pink and viable with no blood or signs of bleeding, gas and nonbloody stool in bag  Extremities: no edema  Skin: warm, dry, jaundiced                          8.0    7.85  )-----------( 64       ( 25 Apr 2024 03:15 )             22.6   04-25    137  |  101  |  30<H>  ----------------------------<  110<H>  3.9   |  22  |  0.90    Ca    8.5      25 Apr 2024 03:15  Phos  3.3     04-25  Mg     1.30     04-25    TPro  6.3  /  Alb  2.6<L>  /  TBili  9.4<H>  /  DBili  x   /  AST  160<H>  /  ALT  103<H>  /  AlkPhos  720<H>  04-25

## 2024-04-25 NOTE — PROGRESS NOTE ADULT - SUBJECTIVE AND OBJECTIVE BOX
Pt. seen and examined w surgical housestaff  IR bx right iliac node performed today to r/o lymphoma  F/U path  F/U hepatology - TB 9.4,  today

## 2024-04-25 NOTE — PROGRESS NOTE ADULT - SUBJECTIVE AND OBJECTIVE BOX
Tommy Sparks, PGY-1  Please contact on Teams    JR ARIELA  64y  Male    Reason for Admission:     SUBJECTIVE/INTERVAL EVENTS: No acute events. Pt seen and examined at bedside.    Physical Exam:   General: NAD jaundiced  HEENT: PERRL,   Neck: Supple, no JVD  Lungs: CTA bilaterally  Heart: RRR, normal S1S2, no murmurs/rubs/gallops  Abdomen: Soft, ND/NT  Extremities: 2+ peripheral pulses, no cyanosis/clubbing/edema, full ROM  Skin: Warm, well-perfused  Neuro: A&O x3, no focal deficits      Vital Signs Last 24 Hrs  T(C): 37 (25 Apr 2024 05:26), Max: 37.1 (24 Apr 2024 14:13)  T(F): 98.6 (25 Apr 2024 05:26), Max: 98.7 (24 Apr 2024 14:13)  HR: 91 (25 Apr 2024 05:26) (85 - 91)  BP: 108/62 (25 Apr 2024 05:26) (108/62 - 116/61)  BP(mean): --  RR: 18 (25 Apr 2024 05:26) (17 - 18)  SpO2: 100% (25 Apr 2024 05:26) (97% - 100%)    Parameters below as of 25 Apr 2024 05:26  Patient On (Oxygen Delivery Method): room air          Consultant(s) Notes Reviewed:  [x] YES  [ ] NO  Care Discussed with Consultants/Other Providers [x] YES  [ ] NO    LABS:                        8.0    7.85  )-----------( 64       ( 25 Apr 2024 03:15 )             22.6                         8.0    8.21  )-----------( 62       ( 24 Apr 2024 03:00 )             22.8                         8.0    8.67  )-----------( 59       ( 23 Apr 2024 06:47 )             22.9                               137    |  101    |  30                  Calcium: 8.5   / iCa: x      (04-25 @ 03:15)    ----------------------------<  110       Magnesium: 1.30                             3.9     |  22     |  0.90             Phosphorous: 3.3      TPro  6.3    /  Alb  2.6    /  TBili  9.4    /  DBili  x      /  AST  160    /  ALT  103    /  AlkPhos  720    25 Apr 2024 03:15    Urinalysis Basic - ( 25 Apr 2024 03:15 )    Color: x / Appearance: x / SG: x / pH: x  Gluc: 110 mg/dL / Ketone: x  / Bili: x / Urobili: x   Blood: x / Protein: x / Nitrite: x   Leuk Esterase: x / RBC: x / WBC x   Sq Epi: x / Non Sq Epi: x / Bacteria: x        RADIOLOGY & ADDITIONAL TESTS:    Imaging Personally Reviewed:  [x] YES  [ ] NO    MEDICATIONS  (STANDING):  allopurinol 100 milliGRAM(s) Oral daily  atorvastatin 20 milliGRAM(s) Oral at bedtime  chlorhexidine 2% Cloths 1 Application(s) Topical daily  dextrose 10% Bolus 125 milliLiter(s) IV Bolus once  dextrose 5%. 1000 milliLiter(s) (50 mL/Hr) IV Continuous <Continuous>  dextrose 5%. 1000 milliLiter(s) (100 mL/Hr) IV Continuous <Continuous>  dextrose 50% Injectable 25 Gram(s) IV Push once  dextrose 50% Injectable 25 Gram(s) IV Push once  dextrose 50% Injectable 12.5 Gram(s) IV Push once  dextrose Oral Gel 15 Gram(s) Oral once  glucagon  Injectable 1 milliGRAM(s) IntraMuscular once  hydrochlorothiazide 25 milliGRAM(s) Oral daily  influenza  Vaccine (HIGH DOSE) 0.7 milliLiter(s) IntraMuscular once  insulin lispro (ADMELOG) corrective regimen sliding scale   SubCutaneous three times a day before meals  insulin lispro (ADMELOG) corrective regimen sliding scale   SubCutaneous at bedtime  losartan 100 milliGRAM(s) Oral daily  multivitamin 1 Tablet(s) Oral daily  silver nitrate Applicator 1 Application(s) Topical once    MEDICATIONS  (PRN):  acetaminophen     Tablet .. 650 milliGRAM(s) Oral every 6 hours PRN Temp greater or equal to 38C (100.4F), Mild Pain (1 - 3)  melatonin 3 milliGRAM(s) Oral at bedtime PRN Insomnia  ondansetron Injectable 4 milliGRAM(s) IV Push every 8 hours PRN Nausea and/or Vomiting      HEALTH ISSUES - PROBLEM Dx:  Painless jaundice    Anemia due to acute blood loss    Skin excoriation    Prophylactic measure    DM (diabetes mellitus), type 2

## 2024-04-25 NOTE — PRE PROCEDURE NOTE - PRE PROCEDURE EVALUATION
Interventional Radiology Pre-Procedure Note    Diagnosis/Indication: Patient is a 64y old Male who presents with a chief complaint of Jaundice, found to have enlarged LNs. Biopsy of right iliac chain LN was requested.     PAST MEDICAL & SURGICAL HISTORY:  Prostate cancer  Diverticulitis  Type 2 diabetes mellitus  Gout  Parastomal hernia  Status post Shelley procedure     Allergies: No Known Allergies      LABS:  CBC Full  -  ( 25 Apr 2024 03:15 )  WBC Count : 7.85 K/uL  RBC Count : 2.41 M/uL  Hemoglobin : 8.0 g/dL  Hematocrit : 22.6 %  Platelet Count - Automated : 64 K/uL  Mean Cell Volume : 93.8 fL  Mean Cell Hemoglobin : 33.2 pg  Mean Cell Hemoglobin Concentration : 35.4 gm/dL  Auto Neutrophil # : 4.76 K/uL  Auto Lymphocyte # : 1.53 K/uL  Auto Monocyte # : 1.06 K/uL  Auto Eosinophil # : 0.03 K/uL  Auto Basophil # : 0.02 K/uL  Auto Neutrophil % : 60.6 %  Auto Lymphocyte % : 19.5 %  Auto Monocyte % : 13.5 %  Auto Eosinophil % : 0.4 %  Auto Basophil % : 0.3 %    04-25    137  |  101  |  30<H>  ----------------------------<  110<H>  3.9   |  22  |  0.90    Ca    8.5      25 Apr 2024 03:15  Phos  3.3     04-25  Mg     1.30     04-25    TPro  6.3  /  Alb  2.6<L>  /  TBili  9.4<H>  /  DBili  x   /  AST  160<H>  /  ALT  103<H>  /  AlkPhos  720<H>  04-25    PT/INR - ( 25 Apr 2024 03:15 )   PT: 14.0 sec;   INR: 1.26 ratio      PTT - ( 25 Apr 2024 03:15 )  PTT:37.4 sec    Procedure/ risks/ benefits/ alternatives were discussed with the patient, including but not limited to risks of infection, bleeding and possible injury to nearby structures. The patient verbalizes understanding, and witnessed informed consent was obtained.

## 2024-04-25 NOTE — PROGRESS NOTE ADULT - PROBLEM SELECTOR PLAN 1
ddx includes choledocholithiasis, sludge, pancreatic mass, infiltrative process noted on CT as outpatient  sono without cholelithiasis, and bile duct dilatation    - CT chest abd pelvis w/ IV contrast - similar to outpatient imaging, infiltrative soft tissue process in kidneys, portal system  - per CT report, findings concerning for lymphoma vs IgG4 disease  - SPEP, serum IgG4 neg  - pending lymph node biopsy 4/24 w/ IR  - discussed with heme/onc - they will weigh in once pathology results

## 2024-04-25 NOTE — PROGRESS NOTE ADULT - ASSESSMENT
64 y/o male with a history of prostate cancer s/p  chemo/radiation 8 years ago and sigmoid diverticulitis/colovesical fistula who is s/p Hewitt's procedure on  08/2016,  s/p 2 aborted reversal attempts in 11/2016 by Dr. Parkinson and again 06/29/2017 at [facility] by Dr. Oconnell due to severe pelvic fibrosis/scar tissue,Pt saw Dr Tan Vargas on 02/28 for possible repair of parastomal hernia. Outpatient CT findings of  new periportal edema/thickening, splenomegaly and bilateral diffuse infiltrative soft tissue throughout collecting system and kidneys, findings concerning for possible lymphoma. Admitted for workup of painless jaundice. US 4/17 showing biliary ductal dilatation, cholelithiasis, possible debris in CBD. Now s/p EGD with EUS with GI on 4/19 showing periportal edema, no obvious masses or choledocholithiasis.  Liver duplex 4/20 unrevealing w/patent vasculature. MR Abdomen 4/22 showing intrahepatic ductal dilatation, no extrahepatic ductal dilatation, no choledocholithiasis, bilateral urothelial inflammation, splenomegaly, possible systemic process.    PLAN:  - No acute surgical intervention  - Consider Hematology and Oncology consult for findings concerning for lymphoma on MRI as well as thrombocytopenia  - Appreciate GI, Hepatology input  - IR LN biopsy today given c/f lymphoma on imaging  - Please check LDH w/AM labs  - Rest of care per primary team    E-Team surgery  [id number]

## 2024-04-26 ENCOUNTER — TRANSCRIPTION ENCOUNTER (OUTPATIENT)
Age: 64
End: 2024-04-26

## 2024-04-26 VITALS
OXYGEN SATURATION: 100 % | HEART RATE: 100 BPM | RESPIRATION RATE: 18 BRPM | DIASTOLIC BLOOD PRESSURE: 62 MMHG | TEMPERATURE: 98 F | SYSTOLIC BLOOD PRESSURE: 117 MMHG

## 2024-04-26 LAB
AFP-TM SERPL-MCNC: 2 NG/ML — SIGNIFICANT CHANGE UP
ALBUMIN SERPL ELPH-MCNC: 2.6 G/DL — LOW (ref 3.3–5)
ALP SERPL-CCNC: 712 U/L — HIGH (ref 40–120)
ALT FLD-CCNC: 105 U/L — HIGH (ref 4–41)
ANION GAP SERPL CALC-SCNC: 15 MMOL/L — HIGH (ref 7–14)
AST SERPL-CCNC: 140 U/L — HIGH (ref 4–40)
BASOPHILS # BLD AUTO: 0.02 K/UL — SIGNIFICANT CHANGE UP (ref 0–0.2)
BASOPHILS NFR BLD AUTO: 0.2 % — SIGNIFICANT CHANGE UP (ref 0–2)
BILIRUB SERPL-MCNC: 9.2 MG/DL — HIGH (ref 0.2–1.2)
BUN SERPL-MCNC: 25 MG/DL — HIGH (ref 7–23)
CALCIUM SERPL-MCNC: 8.4 MG/DL — SIGNIFICANT CHANGE UP (ref 8.4–10.5)
CHLORIDE SERPL-SCNC: 102 MMOL/L — SIGNIFICANT CHANGE UP (ref 98–107)
CO2 SERPL-SCNC: 20 MMOL/L — LOW (ref 22–31)
CREAT SERPL-MCNC: 0.89 MG/DL — SIGNIFICANT CHANGE UP (ref 0.5–1.3)
EGFR: 95 ML/MIN/1.73M2 — SIGNIFICANT CHANGE UP
EOSINOPHIL # BLD AUTO: 0.02 K/UL — SIGNIFICANT CHANGE UP (ref 0–0.5)
EOSINOPHIL NFR BLD AUTO: 0.2 % — SIGNIFICANT CHANGE UP (ref 0–6)
GLUCOSE BLDC GLUCOMTR-MCNC: 148 MG/DL — HIGH (ref 70–99)
GLUCOSE BLDC GLUCOMTR-MCNC: 186 MG/DL — HIGH (ref 70–99)
GLUCOSE SERPL-MCNC: 113 MG/DL — HIGH (ref 70–99)
HCT VFR BLD CALC: 23.5 % — LOW (ref 39–50)
HGB BLD-MCNC: 8.2 G/DL — LOW (ref 13–17)
IANC: 5.04 K/UL — SIGNIFICANT CHANGE UP (ref 1.8–7.4)
IMM GRANULOCYTES NFR BLD AUTO: 6.7 % — HIGH (ref 0–0.9)
LYMPHOCYTES # BLD AUTO: 1.93 K/UL — SIGNIFICANT CHANGE UP (ref 1–3.3)
LYMPHOCYTES # BLD AUTO: 22.2 % — SIGNIFICANT CHANGE UP (ref 13–44)
MAGNESIUM SERPL-MCNC: 1.3 MG/DL — LOW (ref 1.6–2.6)
MCHC RBC-ENTMCNC: 33.2 PG — SIGNIFICANT CHANGE UP (ref 27–34)
MCHC RBC-ENTMCNC: 34.9 GM/DL — SIGNIFICANT CHANGE UP (ref 32–36)
MCV RBC AUTO: 95.1 FL — SIGNIFICANT CHANGE UP (ref 80–100)
MONOCYTES # BLD AUTO: 1.11 K/UL — HIGH (ref 0–0.9)
MONOCYTES NFR BLD AUTO: 12.8 % — SIGNIFICANT CHANGE UP (ref 2–14)
NEUTROPHILS # BLD AUTO: 5.04 K/UL — SIGNIFICANT CHANGE UP (ref 1.8–7.4)
NEUTROPHILS NFR BLD AUTO: 57.9 % — SIGNIFICANT CHANGE UP (ref 43–77)
NRBC # BLD: 0 /100 WBCS — SIGNIFICANT CHANGE UP (ref 0–0)
NRBC # FLD: 0 K/UL — SIGNIFICANT CHANGE UP (ref 0–0)
PHOSPHATE SERPL-MCNC: 3.3 MG/DL — SIGNIFICANT CHANGE UP (ref 2.5–4.5)
PLATELET # BLD AUTO: 71 K/UL — LOW (ref 150–400)
POTASSIUM SERPL-MCNC: 3.6 MMOL/L — SIGNIFICANT CHANGE UP (ref 3.5–5.3)
POTASSIUM SERPL-SCNC: 3.6 MMOL/L — SIGNIFICANT CHANGE UP (ref 3.5–5.3)
PROT SERPL-MCNC: 6.1 G/DL — SIGNIFICANT CHANGE UP (ref 6–8.3)
RBC # BLD: 2.47 M/UL — LOW (ref 4.2–5.8)
RBC # FLD: 25.4 % — HIGH (ref 10.3–14.5)
SODIUM SERPL-SCNC: 137 MMOL/L — SIGNIFICANT CHANGE UP (ref 135–145)
TM INTERPRETATION: SIGNIFICANT CHANGE UP
WBC # BLD: 8.7 K/UL — SIGNIFICANT CHANGE UP (ref 3.8–10.5)
WBC # FLD AUTO: 8.7 K/UL — SIGNIFICANT CHANGE UP (ref 3.8–10.5)

## 2024-04-26 PROCEDURE — 99232 SBSQ HOSP IP/OBS MODERATE 35: CPT

## 2024-04-26 PROCEDURE — 99239 HOSP IP/OBS DSCHRG MGMT >30: CPT

## 2024-04-26 RX ORDER — MAGNESIUM SULFATE 500 MG/ML
2 VIAL (ML) INJECTION ONCE
Refills: 0 | Status: COMPLETED | OUTPATIENT
Start: 2024-04-26 | End: 2024-04-26

## 2024-04-26 RX ADMIN — Medication 25 GRAM(S): at 10:09

## 2024-04-26 RX ADMIN — Medication 1 TABLET(S): at 12:59

## 2024-04-26 RX ADMIN — LOSARTAN POTASSIUM 100 MILLIGRAM(S): 100 TABLET, FILM COATED ORAL at 06:12

## 2024-04-26 RX ADMIN — CHLORHEXIDINE GLUCONATE 1 APPLICATION(S): 213 SOLUTION TOPICAL at 13:00

## 2024-04-26 RX ADMIN — Medication 100 MILLIGRAM(S): at 12:59

## 2024-04-26 NOTE — CHART NOTE - NSCHARTNOTEFT_GEN_A_CORE
Code fall called overhead and patient was evaluated at bedside. Patient reports attempting to sit down and missing the seat of the chair as he did not realize the chair had wheels on it. Patient had no head strike/LOC or any signs concern for acute trauma necessitating and further workup or intervention. Confirmed story with staff and evaluated patient who was AOx3, and had spontaneous movements of all extremities without any concerns. Pt had minimal, estimated <10 cc, bleeding from ostomy site. Pressure was applied and hemostasis achieved prior to arrival at patient's bedside. No further concern or orders at this time, but will continue monitor patient for further signs/symptoms of injury during course of night.

## 2024-04-26 NOTE — PROGRESS NOTE ADULT - PROBLEM/PLAN-1
Received back earlier from PACU awake and alert. Respirations easy. Denies c/o post op discomfort.   Right leg/knee dressing D/I, polar care icing in place. VS stable. Tolerated p.o. well. Ambulated to BR with walker for PWB to right leg and voided freely. Prescriptions given to patient previously by MD. Verbal and written discharge instructions given and discussed. Understanding of post op care and follow up verbalized. IV removed, up and dressed. Taken to door per W/C escort and discharged home with family.  
DISPLAY PLAN FREE TEXT

## 2024-04-26 NOTE — DISCHARGE NOTE NURSING/CASE MANAGEMENT/SOCIAL WORK - NSDCPNINST_GEN_ALL_CORE
Patient A&Ox4, no c/o pain during this shift, patient discharged to home this afternoon, no distress noted at this time.

## 2024-04-26 NOTE — PROGRESS NOTE ADULT - PROBLEM SELECTOR PROBLEM 4
DM (diabetes mellitus), type 2

## 2024-04-26 NOTE — PROGRESS NOTE ADULT - PROBLEM SELECTOR PLAN 5
DVT: SCDs  Diet: reg, NPO at [facility] 4/18 for ERCP  Dispo: likely home, active
DVT: SCDs  Diet: reg  Dispo: likely home, active
DVT: SCDs  Diet: reg  Dispo: likely home, active
DVT: SCDs  Diet: reg, NPO at [facility] 4/18 for ERCP  Dispo: likely home, active
DVT: SCDs  Diet: reg, NPO at [facility] 4/18 for ERCP  Dispo: likely home, active
DVT: SCDs  Diet: reg  Dispo: likely home, active
DVT: SCDs  Diet: reg, NPO at [place] 4/18 for ERCP  Dispo: likely home, active

## 2024-04-26 NOTE — PROGRESS NOTE ADULT - PROBLEM SELECTOR PLAN 1
ddx includes choledocholithiasis, sludge, pancreatic mass, infiltrative process noted on CT as outpatient  sono without cholelithiasis, and bile duct dilatation    - CT chest abd pelvis w/ IV contrast - similar to outpatient imaging, infiltrative soft tissue process in kidneys, portal system  - per CT report, findings concerning for lymphoma vs IgG4 disease  - SPEP, serum IgG4 neg  - pending lymph node biopsy results  - discussed with heme/onc - they will weigh in once pathology results

## 2024-04-26 NOTE — PROGRESS NOTE ADULT - ATTENDING COMMENTS
63-year-old male with a history of prostate cancer and subsequent pelvic surgeries presents with jaundice and a cholestatic pattern on liver tests. CT findings suggest hepatomegaly, potential intrahepatic biliary dilation, and splenomegaly, raising concerns for an infiltrative disease, possibly lymphoma or IgG-4 disease. EGD/EUS and MRI confirm these findings without evidence of cirrhosis or acute liver failure.    Recommendations:    -Await results of lymph node biopsy.  -Follow up with Hematology/Oncology.  -No further inpatient Hepatology work-up needed.    If discharged, recommend outpatient hepatology follow up at short interval.
Awaiting ERCP today.
Jaundice  possible ercp monday
62 y/o M, hx T2DM, gout, prostate ca treated with chemo/rads, in remission, Shelley's with chronic colostomy due to aborted reversal, presents for painless jaundice and likely blood loss anemia. Outpatient imaging notable for infiltrative soft tissue process involving the kidneys and liver/portal triad.      #Jaundice  - GI following, patient s/p EGD and EUS on 4/19  - MRCP: no additional info   - plan for lymph node biopsy by IR today  - SPEP And serum IgG4 negative     #Anemia  - likely 2/2 acute blood loss around stoma site  - surgery consult appreciated  - s/p silver nitrate cautery, however noted with possible bleeding again, no active bleeding noted on 4/21  - cont to trend hgb, s/p PRBC, f/w CBC    Will to follow up path after biopsy.   F/w Oncology after biopsy.
63M, hx T2DM, gout, prostate ca treated with chemo/rads,  Shelley's with chronic colostomy due to aborted reversal, admitted with painless jaundice and anemia, Outpatient imaging notable for infiltrative soft tissue process     Jaundice: GI/Sx input appreciated, repeat CT shows LAD and soft tissue infiltration of liver/kidneys.  Plan for ERCP today    Anemia: reports of blood loss around stoma site with "filling the bag" with blood from patient.  Transfuse as needed.  Surgery input appreciated, pressure and silver nitrate, bleeding better controlled.      Thrombocytopenia, unclear etiology.  Monitor.
64 y/o M, hx T2DM, gout, prostate ca treated with chemo/rads, in remission, Shelley's with chronic colostomy due to aborted reversal, presents for painless jaundice and likely blood loss anemia. Outpatient imaging notable for infiltrative soft tissue process involving the kidneys and liver/portal triad.      #Jaundice  - GI following, patient s/p EGD and EUS on 4/19  - MRCP: no additional info   - plan for lymph node biopsy by IR today  - SPEP And serum IgG4 negative     #Anemia  - likely 2/2 acute blood loss around stoma site  - surgery consult appreciated  - s/p silver nitrate cautery, however noted with possible bleeding again, no active bleeding noted on 4/21  - cont to trend hgb, s/p PRBC, f/w CBC    Will to follow up path after biopsy.   F/w Oncology after biopsy.
Briefly, this is a 62 y/o M, hx T2DM, gout, prostate ca treated with chemo/rads, in remission, Shelley's with chronic colostomy due to aborted reversal, presents for painless jaundice and likely blood loss anemia. Outpatient imaging notable for infiltrative soft tissue process involving the kidneys and liver/portal triad.    Patient seen and examined at bedside. Reports feeling very well. Eager to go home. Patient reports he was informed that he could go home today after MRI abdomen performed. Informed patient that per GI team he is planned for additional procedures (ERCP). Also discussed with MRI department, MRCP tentatively scheduled for 4/21. Patient informed of the above and understands he will need to remain admitted for further workup and intervention. Otherwise denies any nausea, vomiting, fever, chills, abdominal pain. States that he feels as though his jaundice is slightly improved. VSS, afrebrile. Exam notable for visible jaundice. Abdominal exam notable for LLQ stoma. Labs notable for downtrending LFTs but elevated Bili.    #Jaundice  - GI following, patient s/p EGD and EUS on 4/19  - MRCP pending  - tentative plan for ERCP with GI on 4/22  - SPEP And serum IgG4 pending    #Anemia  - likely 2/2 acute blood loss around stoma site  - surgery consult appreciated  - s/p silver nitrate cautery, bleeding now controlled  - cont to trend hgb    Remainder of plan as outlined above.  Case and plan of care discussed extensively with patient and HS7.
64 y/o M, hx T2DM, gout, prostate ca treated with chemo/rads, in remission, Shelley's with chronic colostomy due to aborted reversal, presents for painless jaundice and likely blood loss anemia. Outpatient imaging notable for infiltrative soft tissue process involving the kidneys and liver/portal triad.      #Jaundice  - GI following, patient s/p EGD and EUS on 4/19  - MRCP pending today  - tentative plan for ERCP with GI   - SPEP And serum IgG4 negative     #Anemia  - likely 2/2 acute blood loss around stoma site  - surgery consult appreciated  - s/p silver nitrate cautery, however noted with possible bleeding again, no active bleeding noted on 4/21  - cont to trend hgb, noted with downtrend on 4/21, given 1U PRBC, f/w CBC    Will to follow up path after biopsy.
64 y/o M, hx T2DM, gout, prostate ca treated with chemo/rads, in remission, Shelley's with chronic colostomy due to aborted reversal, presents for painless jaundice and likely blood loss anemia. Outpatient imaging notable for infiltrative soft tissue process involving the kidneys and liver/portal triad.      #Jaundice  - GI following, patient s/p EGD and EUS on 4/19  - MRCP: no additional info   - plan for lymph node biopsy by IR.  - SPEP And serum IgG4 negative     #Anemia  - likely 2/2 acute blood loss around stoma site  - surgery consult appreciated  - s/p silver nitrate cautery, however noted with possible bleeding again, no active bleeding noted on 4/21  - cont to trend hgb, noted with downtrend on 4/21, given 1U PRBC, f/w CBC    Will to follow up path after biopsy.   F/w Oncology after biopsy.
63M, hx T2DM, gout, prostate ca treated with chemo/rads,  Shelley's with chronic colostomy due to aborted reversal, admitted with painless jaundice and anemia, Outpatient imaging notable for infiltrative soft tissue process     Jaundice: GI/Sx input appreciated, repeat CT today tentative plan for ERCP tomorrow    Anemia: reports of blood loss around stoma site with "filling the bag" with blood from patient.  Transfuse as needed.  Surgery input appreciated, pressure and silver nitrate.    Thrombocytopenia, unclear etiology.  Monitor.
Briefly, this is a 62 y/o M, hx T2DM, gout, prostate ca treated with chemo/rads, in remission, Shelley's with chronic colostomy due to aborted reversal, presents for painless jaundice and likely blood loss anemia. Outpatient imaging notable for infiltrative soft tissue process involving the kidneys and liver/portal triad.    Patient seen and examined at bedside. Reports overall feeling well. States that he noted some blood in his stoma earlier but it has since resolved. Denies any nausea, vomiting, fever, chills, abdominal pain. VSS, afebrile. Exam notable for visible jaundice. Abdominal exam notable for LLQ stoma, no active bleeding noted. Labs notable elevated LFTs and bilirubin.    #Jaundice  - GI following, patient s/p EGD and EUS on 4/19  - MRCP pending  - tentative plan for ERCP with GI on 4/22  - SPEP And serum IgG4 pending    #Anemia  - likely 2/2 acute blood loss around stoma site  - surgery consult appreciated  - s/p silver nitrate cautery, however noted with possible bleeding again, no active bleeding noted on 4/21  - cont to trend hgb, noted with downtrend on 4/21, given 1U PRBC    Remainder of plan as outlined above.  Case and plan of care discussed extensively with patient and HS7.

## 2024-04-26 NOTE — PROGRESS NOTE ADULT - PROVIDER SPECIALTY LIST ADULT
Internal Medicine
Surgery
Gastroenterology
Hepatology
Surgery
Anesthesia
Internal Medicine
Surgery
Gastroenterology
Hepatology
Hepatology
Surgery
Surgery
Internal Medicine

## 2024-04-26 NOTE — DISCHARGE NOTE NURSING/CASE MANAGEMENT/SOCIAL WORK - NSDCFUADDAPPT_GEN_ALL_CORE_FT
Please make an appointment with the above clinics for GI and oncology follow up. Please follow up within 1 week. Please follow up with your surgeon within 1-2 weeks as well to ensure your bleeding is controlled.    APPTS ARE READY TO BE MADE: [X] YES    Best Family or Patient Contact (if needed):    Additional Information about above appointments (if needed):    1: Gastroenterology within 1 week  2: Medical oncology within 1 week  3: Dr. Vargas- Surgery - in 1 week     Other comments or requests:

## 2024-04-26 NOTE — PROGRESS NOTE ADULT - PROBLEM SELECTOR PROBLEM 1
Painless jaundice

## 2024-04-26 NOTE — PROGRESS NOTE ADULT - PROBLEM SELECTOR PROBLEM 3
Skin excoriation

## 2024-04-26 NOTE — PROGRESS NOTE ADULT - SUBJECTIVE AND OBJECTIVE BOX
Interval Events:   s/p LN biopsy with IR yesterday   Patient overall feeling well w/o complaints  Mechanical fall overnight noted without injury   Vitals, exam and labs remain stable    Hospital Medications:  acetaminophen     Tablet .. 650 milliGRAM(s) Oral every 6 hours PRN  allopurinol 100 milliGRAM(s) Oral daily  atorvastatin 20 milliGRAM(s) Oral at bedtime  chlorhexidine 2% Cloths 1 Application(s) Topical daily  dextrose 10% Bolus 125 milliLiter(s) IV Bolus once  dextrose 5%. 1000 milliLiter(s) IV Continuous <Continuous>  dextrose 5%. 1000 milliLiter(s) IV Continuous <Continuous>  dextrose 50% Injectable 12.5 Gram(s) IV Push once  dextrose 50% Injectable 25 Gram(s) IV Push once  dextrose 50% Injectable 25 Gram(s) IV Push once  dextrose Oral Gel 15 Gram(s) Oral once  glucagon  Injectable 1 milliGRAM(s) IntraMuscular once  hydrochlorothiazide 25 milliGRAM(s) Oral daily  influenza  Vaccine (HIGH DOSE) 0.7 milliLiter(s) IntraMuscular once  insulin lispro (ADMELOG) corrective regimen sliding scale   SubCutaneous at bedtime  insulin lispro (ADMELOG) corrective regimen sliding scale   SubCutaneous three times a day before meals  losartan 100 milliGRAM(s) Oral daily  melatonin 3 milliGRAM(s) Oral at bedtime PRN  multivitamin 1 Tablet(s) Oral daily  ondansetron Injectable 4 milliGRAM(s) IV Push every 8 hours PRN  silver nitrate Applicator 1 Application(s) Topical once      ROS: See above.    PHYSICAL EXAM:   Vital Signs:  Vital Signs Last 24 Hrs  T(C): 36.8 (26 Apr 2024 05:35), Max: 36.8 (25 Apr 2024 21:09)  T(F): 98.3 (26 Apr 2024 05:35), Max: 98.3 (26 Apr 2024 05:35)  HR: 90 (26 Apr 2024 05:35) (90 - 100)  BP: 127/67 (26 Apr 2024 05:35) (113/65 - 160/75)  BP(mean): --  RR: 18 (26 Apr 2024 05:35) (17 - 18)  SpO2: 98% (26 Apr 2024 05:35) (98% - 100%)    Parameters below as of 26 Apr 2024 05:35  Patient On (Oxygen Delivery Method): room air    GENERAL:  NAD, resting comfortably in bed  HEENT:  sclera icteric  CHEST:  Normal Effort  HEART:  HDS  ABDOMEN:  Soft, non-tender, non-distended  SKIN:  Warm & Dry. jaundice  NEURO:  Alert, conversant, no focal deficit    LABS:                        8.2    8.70  )-----------( 71       ( 26 Apr 2024 07:40 )             23.5     Mean Cell Volume: 95.1 fL (04-26-24 @ 07:40)    04-26    137  |  102  |  25<H>  ----------------------------<  113<H>  3.6   |  20<L>  |  0.89    Ca    8.4      26 Apr 2024 07:40  Phos  3.3     04-26  Mg     1.30     04-26    TPro  6.1  /  Alb  2.6<L>  /  TBili  9.2<H>  /  DBili  x   /  AST  140<H>  /  ALT  105<H>  /  AlkPhos  712<H>  04-26    LIVER FUNCTIONS - ( 26 Apr 2024 07:40 )  Alb: 2.6 g/dL / Pro: 6.1 g/dL / ALK PHOS: 712 U/L / ALT: 105 U/L / AST: 140 U/L / GGT: x           PT/INR - ( 25 Apr 2024 03:15 )   PT: 14.0 sec;   INR: 1.26 ratio         PTT - ( 25 Apr 2024 03:15 )  PTT:37.4 sec

## 2024-04-26 NOTE — PROGRESS NOTE ADULT - SUBJECTIVE AND OBJECTIVE BOX
E Team Surgery Daily Progress Note    Subjective:   Patient seen at bedside this AM.     24h Events:   - Overnight, code-fall, no LOC/no headstrike    Objective:  Vital Signs  T(C): 36.8 (04-26 @ 05:35), Max: 36.8 (04-25 @ 21:09)  HR: 90 (04-26 @ 05:35) (90 - 100)  BP: 127/67 (04-26 @ 05:35) (113/65 - 160/75)  RR: 18 (04-26 @ 05:35) (17 - 18)  SpO2: 98% (04-26 @ 05:35) (98% - 100%)  04-24-24 @ 07:01  -  04-25-24 @ 07:00  --------------------------------------------------------  IN:  Total IN: 0 mL    OUT:    Colostomy (mL): 400 mL  Total OUT: 400 mL    Total NET: -400 mL          Physical Exam:  GEN: resting in bed comfortably in NAD  RESP: no increased WOB  ABD:   EXTR: warm, grossly intact  NEURO: AAOx4    Labs:                        8.0    7.85  )-----------( 64       ( 25 Apr 2024 03:15 )             22.6   04-25    137  |  101  |  30<H>  ----------------------------<  110<H>  3.9   |  22  |  0.90    Ca    8.5      25 Apr 2024 03:15  Phos  3.3     04-25  Mg     1.30     04-25    TPro  6.3  /  Alb  2.6<L>  /  TBili  9.4<H>  /  DBili  x   /  AST  160<H>  /  ALT  103<H>  /  AlkPhos  720<H>  04-25    CAPILLARY BLOOD GLUCOSE      POCT Blood Glucose.: 133 mg/dL (25 Apr 2024 22:13)  POCT Blood Glucose.: 167 mg/dL (25 Apr 2024 18:22)  POCT Blood Glucose.: 155 mg/dL (25 Apr 2024 12:50)      Medications:   MEDICATIONS  (STANDING):  allopurinol 100 milliGRAM(s) Oral daily  atorvastatin 20 milliGRAM(s) Oral at bedtime  chlorhexidine 2% Cloths 1 Application(s) Topical daily  dextrose 10% Bolus 125 milliLiter(s) IV Bolus once  dextrose 5%. 1000 milliLiter(s) (50 mL/Hr) IV Continuous <Continuous>  dextrose 5%. 1000 milliLiter(s) (100 mL/Hr) IV Continuous <Continuous>  dextrose 50% Injectable 25 Gram(s) IV Push once  dextrose 50% Injectable 25 Gram(s) IV Push once  dextrose 50% Injectable 12.5 Gram(s) IV Push once  dextrose Oral Gel 15 Gram(s) Oral once  glucagon  Injectable 1 milliGRAM(s) IntraMuscular once  hydrochlorothiazide 25 milliGRAM(s) Oral daily  influenza  Vaccine (HIGH DOSE) 0.7 milliLiter(s) IntraMuscular once  insulin lispro (ADMELOG) corrective regimen sliding scale   SubCutaneous three times a day before meals  insulin lispro (ADMELOG) corrective regimen sliding scale   SubCutaneous at bedtime  losartan 100 milliGRAM(s) Oral daily  multivitamin 1 Tablet(s) Oral daily  silver nitrate Applicator 1 Application(s) Topical once    MEDICATIONS  (PRN):  acetaminophen     Tablet .. 650 milliGRAM(s) Oral every 6 hours PRN Temp greater or equal to 38C (100.4F), Mild Pain (1 - 3)  melatonin 3 milliGRAM(s) Oral at bedtime PRN Insomnia  ondansetron Injectable 4 milliGRAM(s) IV Push every 8 hours PRN Nausea and/or Vomiting      Imaging:       E Team Surgery Daily Progress Note    Subjective:   Patient seen at bedside this AM. Resting comfortably in bed.     24h Events:   - Overnight, code-fall, no LOC/no headstrike    Objective:  Vital Signs  T(C): 36.8 (04-26 @ 05:35), Max: 36.8 (04-25 @ 21:09)  HR: 90 (04-26 @ 05:35) (90 - 100)  BP: 127/67 (04-26 @ 05:35) (113/65 - 160/75)  RR: 18 (04-26 @ 05:35) (17 - 18)  SpO2: 98% (04-26 @ 05:35) (98% - 100%)  04-24-24 @ 07:01  -  04-25-24 @ 07:00  --------------------------------------------------------  IN:  Total IN: 0 mL    OUT:    Colostomy (mL): 400 mL  Total OUT: 400 mL    Total NET: -400 mL      Physical Exam:  GEN: resting in bed comfortably in NAD  RESP: no increased WOB  EXTR: warm, grossly intact  NEURO: AAOx4    Labs:                        8.0    7.85  )-----------( 64       ( 25 Apr 2024 03:15 )             22.6   04-25    137  |  101  |  30<H>  ----------------------------<  110<H>  3.9   |  22  |  0.90    Ca    8.5      25 Apr 2024 03:15  Phos  3.3     04-25  Mg     1.30     04-25    TPro  6.3  /  Alb  2.6<L>  /  TBili  9.4<H>  /  DBili  x   /  AST  160<H>  /  ALT  103<H>  /  AlkPhos  720<H>  04-25    CAPILLARY BLOOD GLUCOSE      POCT Blood Glucose.: 133 mg/dL (25 Apr 2024 22:13)  POCT Blood Glucose.: 167 mg/dL (25 Apr 2024 18:22)  POCT Blood Glucose.: 155 mg/dL (25 Apr 2024 12:50)      Medications:   MEDICATIONS  (STANDING):  allopurinol 100 milliGRAM(s) Oral daily  atorvastatin 20 milliGRAM(s) Oral at bedtime  chlorhexidine 2% Cloths 1 Application(s) Topical daily  dextrose 10% Bolus 125 milliLiter(s) IV Bolus once  dextrose 5%. 1000 milliLiter(s) (50 mL/Hr) IV Continuous <Continuous>  dextrose 5%. 1000 milliLiter(s) (100 mL/Hr) IV Continuous <Continuous>  dextrose 50% Injectable 25 Gram(s) IV Push once  dextrose 50% Injectable 25 Gram(s) IV Push once  dextrose 50% Injectable 12.5 Gram(s) IV Push once  dextrose Oral Gel 15 Gram(s) Oral once  glucagon  Injectable 1 milliGRAM(s) IntraMuscular once  hydrochlorothiazide 25 milliGRAM(s) Oral daily  influenza  Vaccine (HIGH DOSE) 0.7 milliLiter(s) IntraMuscular once  insulin lispro (ADMELOG) corrective regimen sliding scale   SubCutaneous three times a day before meals  insulin lispro (ADMELOG) corrective regimen sliding scale   SubCutaneous at bedtime  losartan 100 milliGRAM(s) Oral daily  multivitamin 1 Tablet(s) Oral daily  silver nitrate Applicator 1 Application(s) Topical once    MEDICATIONS  (PRN):  acetaminophen     Tablet .. 650 milliGRAM(s) Oral every 6 hours PRN Temp greater or equal to 38C (100.4F), Mild Pain (1 - 3)  melatonin 3 milliGRAM(s) Oral at bedtime PRN Insomnia  ondansetron Injectable 4 milliGRAM(s) IV Push every 8 hours PRN Nausea and/or Vomiting      Imaging:

## 2024-04-26 NOTE — PROGRESS NOTE ADULT - ASSESSMENT
64 y/o male with a history of prostate cancer s/p  chemo/radiation 8 years ago and sigmoid diverticulitis/colovesical fistula who is s/p Hewitt's procedure on  08/2016,  s/p 2 aborted reversal attempts in 11/2016 by Dr. Parkinson and again 06/29/2017 at [facility] by Dr. Oconnell due to severe pelvic fibrosis/scar tissue,Pt saw Dr Tan Vargas on 02/28 for possible repair of parastomal hernia. Outpatient CT findings of  new periportal edema/thickening, splenomegaly and bilateral diffuse infiltrative soft tissue throughout collecting system and kidneys, findings concerning for possible lymphoma. Admitted for workup of painless jaundice. US 4/17 showing biliary ductal dilatation, cholelithiasis, possible debris in CBD. Now s/p EGD with EUS with GI on 4/19 showing periportal edema, no obvious masses or choledocholithiasis.  Liver duplex 4/20 unrevealing w/patent vasculature. MR Abdomen 4/22 showing intrahepatic ductal dilatation, no extrahepatic ductal dilatation, no choledocholithiasis, bilateral urothelial inflammation, splenomegaly, possible systemic process.    PLAN:  - No acute surgical intervention  - Appreciate GI, Hepatology input  - pending LN biopsy path  - Monitor T.Bili/ALP/INR and CBC  - Rest of care per primary team    E-Team surgery  [id number]

## 2024-04-26 NOTE — PROGRESS NOTE ADULT - PROBLEM SELECTOR PLAN 4
On metformin at home, most recent A1C < 7    - A1C likely falsely low due to RBC turnover from blood loss  - correctional scale insulin
On metformin at home, most recent A1C < 7    - checking A1C  - correctional scale insulin

## 2024-04-26 NOTE — PROGRESS NOTE ADULT - SUBJECTIVE AND OBJECTIVE BOX
ANESTHESIA POSTOP CHECK    64y Male POSTOP DAY 1      Vital Signs Last 24 Hrs  T(C): 36.8 (26 Apr 2024 05:35), Max: 36.8 (25 Apr 2024 21:09)  T(F): 98.3 (26 Apr 2024 05:35), Max: 98.3 (26 Apr 2024 05:35)  HR: 90 (26 Apr 2024 05:35) (90 - 100)  BP: 127/67 (26 Apr 2024 05:35) (113/65 - 160/75)  RR: 18 (26 Apr 2024 05:35) (17 - 18)  SpO2: 98% (26 Apr 2024 05:35) (98% - 100%)    Parameters below as of 26 Apr 2024 05:35  Patient On (Oxygen Delivery Method): room air            [x ] NO APPARENT ANESTHESIA COMPLICATIONS

## 2024-04-26 NOTE — PROGRESS NOTE ADULT - PROBLEM SELECTOR PLAN 3
Not actively bleeding    - wound care c/s  - surgery following
- wound care c/s  - surgery following for periodic bleeding
Not actively bleeding    - wound care c/s  - surgery following
Not actively bleeding    - wound care c/s  - surgery following
- wound care c/s  - surgery following for periodic bleeding
Not actively bleeding    - wound care c/s  - surgery following

## 2024-04-26 NOTE — CHART NOTE - NSCHARTNOTESELECT_GEN_ALL_CORE
CT Jan 2024
IR Pre-procedure note
Fall/Event Note
GI/Event Note
gastroenterology/Event Note

## 2024-04-26 NOTE — PROGRESS NOTE ADULT - ASSESSMENT
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 Outpatient imaging notable for infiltrative soft tissue process involving the kidneys and liver/portal triad. S/p IR LN biopsy 4/25, pending pathology.

## 2024-04-26 NOTE — PROGRESS NOTE ADULT - ASSESSMENT
Mr. Rawls is a 63 year old male with prostate ca s/p chemo RT 8 years PTA, sigmoid diverticulitis/colovesicular fistula s/p Mati's (2016) with 2 aborted reversal attempts (11/2016, 6/2017) due to severe pelvic fibrosis/scar tissue, s/p colonoscopy (2018) without significant findings, parastomal hernia c/b skin excoriation and referred to Dr. Ramos for repair c/b peristomal bleeding. During pre-op evaluation, CT 1/2024 noted to have new extensive new infiltrative soft tissue within the liver along the portal triads, and within both kidneys, with splenomegaly and abnormal femoral marrow, findings concerning for possible lymphoma. Patient is admitted for evaluation of jaundice.     #Jaundice  #Hepatosplenomegaly  #Intrahepatic ductal dilation   #Portal hypertensive gastropathy  Patient with jaundice, predominantly direct bilirubinemia (T bili 7.8, D Bili 6.9) and abnormal liver chemistry in a predominantly cholestatic pattern of injury (ALP 600s, AST 100s, ALT 50s-80s). CT demonstrated mild hepatomegaly, linear hypodensities extending along the portal triads representing either periportal edema or intrahepatic biliary ductal dilatation with soft tissue infiltration, normal extrahepatic biliary tree, splenomegaly (17 cm) with question of small splenic infarcts, mild lymphadenopathy in the abdomen/pelvis, neoplastic vs inflammatory process of the kidneys and irregularly appearing prostate gland for which recurrent disease is possible. Patient underwent EGD/EUS on 4/19 with findings of PHG normal bile duct with wall-thickening but no evidence of stones or mass, one enlarged peripancreatic LN a/w pancreatic parenchymal abnormalities. Hepatic doppler US demonstrates patent hepatic and portal veins with appropriately directed flow. Clinical presentation is concerning for infiltrative process with hepatic involvement and non-cirrhotic portal hypertension. No known history of liver disease. Total IgG normal. ASMA weakly positive 1:20, unlikely to represent autoimmune hepatitis. Acute viral hepatitis panel negative. SPEP, serum IgG4 negative. MRI demonstrates mild intrahepatic biliary ductal dilatation without extrahepatic biliary ductal dilatation or choledocholithiasis. Bilateral urothelial inflammation is noted  causing bilateral mild hydronephrosis.  Based on findings, consider systemic infiltrating process, such as IgG-4 disease. Splenomegaly c/f lymphoma.  As of 4/26, liver chemistry remains relatively stable. No acute liver failure or cirrhosis.     Recommendations:  - s/p LN biopsy. Await pathology.   - F/u Hematology/Oncology as appropriate   - No further inpatient Hepatology work-up   - No contraindication to discharge from a Hepatology standpoint   - Repeat CMP in 1 week  - F/u Hepatology in 2-4 weeks    Follow up in Hepatology Clinic: [phone] ([facility] at [address] [place] [address]) or [phone] ([facility] at [address] [place] [address]) or [phone] ([facility] at [address] [place] [address])     Venkatesh Villa MD  Gastroenterology/Hepatology Fellow  Available via [employer] Teams  Pager: [phone]    On Weekdays after 5 PM to 8AM and Weekends/Holidays (All day)  For non-urgent consults, please email [email or web address]  For urgent consults, please contact on call GI team.  See Zeus schedule ([facility]), [employer] system - [phone] ([facility]), or call hospital  ([facility])

## 2024-04-26 NOTE — DISCHARGE NOTE NURSING/CASE MANAGEMENT/SOCIAL WORK - PATIENT PORTAL LINK FT
You can access the FollowMyHealth Patient Portal offered by [facility] by registering at the following website: [email or web address]. By joining [employer]’s FollowMyHealth portal, you will also be able to view your health information using other applications (apps) compatible with our system.

## 2024-04-26 NOTE — PROGRESS NOTE ADULT - SUBJECTIVE AND OBJECTIVE BOX
Tommy Sparks, PGY-1  Please contact on Teams    JR ARIELA  64y  Male    Reason for Admission:     SUBJECTIVE/INTERVAL EVENTS: Mechanical fall onto rear end overnight without head strike or LOC. Pt seen and examined at bedside.    Physical Exam:   General: NAD, jaundiced  HEENT: PERRL, EOMI, normal sclera and conjunctiva, no oral lesions  Neck: Supple, no JVD  Lungs: CTA bilaterally  Heart: RRR, normal S1S2, no murmurs/rubs/gallops  Abdomen: Soft, ND/NT  Extremities: 2+ peripheral pulses, no cyanosis/clubbing/edema, full ROM  Skin: Warm, well-perfused  Neuro: A&O x3, no focal deficits      Vital Signs Last 24 Hrs  T(C): 36.8 (26 Apr 2024 05:35), Max: 36.8 (25 Apr 2024 21:09)  T(F): 98.3 (26 Apr 2024 05:35), Max: 98.3 (26 Apr 2024 05:35)  HR: 90 (26 Apr 2024 05:35) (90 - 100)  BP: 127/67 (26 Apr 2024 05:35) (113/65 - 160/75)  BP(mean): --  RR: 18 (26 Apr 2024 05:35) (17 - 18)  SpO2: 98% (26 Apr 2024 05:35) (98% - 100%)    Parameters below as of 26 Apr 2024 05:35  Patient On (Oxygen Delivery Method): room air          Consultant(s) Notes Reviewed:  [x] YES  [ ] NO  Care Discussed with Consultants/Other Providers [x] YES  [ ] NO    LABS:                        8.0    7.85  )-----------( 64       ( 25 Apr 2024 03:15 )             22.6                         8.0    8.21  )-----------( 62       ( 24 Apr 2024 03:00 )             22.8         Urinalysis Basic - ( 25 Apr 2024 03:15 )    Color: x / Appearance: x / SG: x / pH: x  Gluc: 110 mg/dL / Ketone: x  / Bili: x / Urobili: x   Blood: x / Protein: x / Nitrite: x   Leuk Esterase: x / RBC: x / WBC x   Sq Epi: x / Non Sq Epi: x / Bacteria: x        RADIOLOGY & ADDITIONAL TESTS:    Imaging Personally Reviewed:  [x] YES  [ ] NO    MEDICATIONS  (STANDING):  allopurinol 100 milliGRAM(s) Oral daily  atorvastatin 20 milliGRAM(s) Oral at bedtime  chlorhexidine 2% Cloths 1 Application(s) Topical daily  dextrose 10% Bolus 125 milliLiter(s) IV Bolus once  dextrose 5%. 1000 milliLiter(s) (100 mL/Hr) IV Continuous <Continuous>  dextrose 5%. 1000 milliLiter(s) (50 mL/Hr) IV Continuous <Continuous>  dextrose 50% Injectable 12.5 Gram(s) IV Push once  dextrose 50% Injectable 25 Gram(s) IV Push once  dextrose 50% Injectable 25 Gram(s) IV Push once  dextrose Oral Gel 15 Gram(s) Oral once  glucagon  Injectable 1 milliGRAM(s) IntraMuscular once  hydrochlorothiazide 25 milliGRAM(s) Oral daily  influenza  Vaccine (HIGH DOSE) 0.7 milliLiter(s) IntraMuscular once  insulin lispro (ADMELOG) corrective regimen sliding scale   SubCutaneous at bedtime  insulin lispro (ADMELOG) corrective regimen sliding scale   SubCutaneous three times a day before meals  losartan 100 milliGRAM(s) Oral daily  multivitamin 1 Tablet(s) Oral daily  silver nitrate Applicator 1 Application(s) Topical once    MEDICATIONS  (PRN):  acetaminophen     Tablet .. 650 milliGRAM(s) Oral every 6 hours PRN Temp greater or equal to 38C (100.4F), Mild Pain (1 - 3)  melatonin 3 milliGRAM(s) Oral at bedtime PRN Insomnia  ondansetron Injectable 4 milliGRAM(s) IV Push every 8 hours PRN Nausea and/or Vomiting      HEALTH ISSUES - PROBLEM Dx:  Painless jaundice    Anemia due to acute blood loss    Skin excoriation    Prophylactic measure    DM (diabetes mellitus), type 2

## 2024-05-08 PROBLEM — Z00.00 ENCOUNTER FOR PREVENTIVE HEALTH EXAMINATION: Status: ACTIVE | Noted: 2024-05-08

## 2024-05-10 LAB — NON-GYNECOLOGICAL CYTOLOGY STUDY: SIGNIFICANT CHANGE UP

## 2024-05-17 ENCOUNTER — APPOINTMENT (OUTPATIENT)
Dept: NUCLEAR MEDICINE | Facility: CLINIC | Age: 64
End: 2024-05-17
Payer: MEDICARE

## 2024-05-17 ENCOUNTER — OUTPATIENT (OUTPATIENT)
Dept: OUTPATIENT SERVICES | Facility: HOSPITAL | Age: 64
LOS: 1 days | End: 2024-05-17
Payer: MEDICARE

## 2024-05-17 ENCOUNTER — RESULT REVIEW (OUTPATIENT)
Age: 64
End: 2024-05-17

## 2024-05-17 DIAGNOSIS — C85.97 NON-HODGKIN LYMPHOMA, UNSPECIFIED, SPLEEN: ICD-10-CM

## 2024-05-17 DIAGNOSIS — Z93.3 COLOSTOMY STATUS: Chronic | ICD-10-CM

## 2024-05-17 DIAGNOSIS — K46.9 UNSPECIFIED ABDOMINAL HERNIA WITHOUT OBSTRUCTION OR GANGRENE: Chronic | ICD-10-CM

## 2024-05-17 PROCEDURE — 78815 PET IMAGE W/CT SKULL-THIGH: CPT | Mod: MH

## 2024-05-17 PROCEDURE — 78815 PET IMAGE W/CT SKULL-THIGH: CPT | Mod: 26,PI,MH

## 2024-05-17 PROCEDURE — A9552: CPT

## 2024-05-20 RX ORDER — OLMESARTAN MEDOXOMIL 5 MG/1
1 TABLET, FILM COATED ORAL
Refills: 0 | DISCHARGE

## 2024-05-20 RX ORDER — HYDROCHLOROTHIAZIDE 25 MG
1 TABLET ORAL
Refills: 0 | DISCHARGE

## 2024-05-20 RX ORDER — OMEGA-3 ACID ETHYL ESTERS 1 G
0 CAPSULE ORAL
Refills: 0 | DISCHARGE

## 2024-05-20 RX ORDER — ALLOPURINOL 300 MG
1 TABLET ORAL
Refills: 0 | DISCHARGE

## 2024-05-20 RX ORDER — METFORMIN HYDROCHLORIDE 850 MG/1
1 TABLET ORAL
Refills: 0 | DISCHARGE

## 2024-05-20 RX ORDER — COLCHICINE 0.6 MG
1 TABLET ORAL
Refills: 0 | DISCHARGE

## 2024-06-13 ENCOUNTER — INPATIENT (INPATIENT)
Facility: HOSPITAL | Age: 64
LOS: 19 days | Discharge: HOME | End: 2024-07-03
Attending: STUDENT IN AN ORGANIZED HEALTH CARE EDUCATION/TRAINING PROGRAM | Admitting: STUDENT IN AN ORGANIZED HEALTH CARE EDUCATION/TRAINING PROGRAM
Payer: MEDICARE

## 2024-06-13 VITALS
RESPIRATION RATE: 18 BRPM | OXYGEN SATURATION: 100 % | HEART RATE: 96 BPM | WEIGHT: 216.93 LBS | HEIGHT: 75 IN | SYSTOLIC BLOOD PRESSURE: 116 MMHG | DIASTOLIC BLOOD PRESSURE: 51 MMHG | TEMPERATURE: 97 F

## 2024-06-13 DIAGNOSIS — K46.9 UNSPECIFIED ABDOMINAL HERNIA WITHOUT OBSTRUCTION OR GANGRENE: Chronic | ICD-10-CM

## 2024-06-13 DIAGNOSIS — M10.9 GOUT, UNSPECIFIED: ICD-10-CM

## 2024-06-13 DIAGNOSIS — D75.89 OTHER SPECIFIED DISEASES OF BLOOD AND BLOOD-FORMING ORGANS: ICD-10-CM

## 2024-06-13 DIAGNOSIS — I10 ESSENTIAL (PRIMARY) HYPERTENSION: ICD-10-CM

## 2024-06-13 DIAGNOSIS — Z98.89 OTHER SPECIFIED POSTPROCEDURAL STATES: Chronic | ICD-10-CM

## 2024-06-13 DIAGNOSIS — N17.9 ACUTE KIDNEY FAILURE, UNSPECIFIED: ICD-10-CM

## 2024-06-13 DIAGNOSIS — D72.829 ELEVATED WHITE BLOOD CELL COUNT, UNSPECIFIED: ICD-10-CM

## 2024-06-13 DIAGNOSIS — D64.9 ANEMIA, UNSPECIFIED: ICD-10-CM

## 2024-06-13 DIAGNOSIS — Z90.49 ACQUIRED ABSENCE OF OTHER SPECIFIED PARTS OF DIGESTIVE TRACT: Chronic | ICD-10-CM

## 2024-06-13 DIAGNOSIS — E11.9 TYPE 2 DIABETES MELLITUS WITHOUT COMPLICATIONS: ICD-10-CM

## 2024-06-13 DIAGNOSIS — Z93.3 COLOSTOMY STATUS: Chronic | ICD-10-CM

## 2024-06-13 DIAGNOSIS — R17 UNSPECIFIED JAUNDICE: ICD-10-CM

## 2024-06-13 DIAGNOSIS — Z29.9 ENCOUNTER FOR PROPHYLACTIC MEASURES, UNSPECIFIED: ICD-10-CM

## 2024-06-13 PROBLEM — K43.5 PARASTOMAL HERNIA WITHOUT OBSTRUCTION OR GANGRENE: Chronic | Status: ACTIVE | Noted: 2024-04-17

## 2024-06-13 PROBLEM — C61 MALIGNANT NEOPLASM OF PROSTATE: Chronic | Status: ACTIVE | Noted: 2024-04-17

## 2024-06-13 LAB
ADD ON TEST-SPECIMEN IN LAB: SIGNIFICANT CHANGE UP
ALBUMIN SERPL ELPH-MCNC: 3.4 G/DL — SIGNIFICANT CHANGE UP (ref 3.3–5)
ALP SERPL-CCNC: 1072 U/L — HIGH (ref 40–120)
ALT FLD-CCNC: 187 U/L — HIGH (ref 4–41)
ANION GAP SERPL CALC-SCNC: 16 MMOL/L — HIGH (ref 7–14)
ANISOCYTOSIS BLD QL: SIGNIFICANT CHANGE UP
APTT BLD: 44 SEC — HIGH (ref 24.5–35.6)
AST SERPL-CCNC: 222 U/L — HIGH (ref 4–40)
BASOPHILS # BLD AUTO: 0 K/UL — SIGNIFICANT CHANGE UP (ref 0–0.2)
BASOPHILS NFR BLD AUTO: 0 % — SIGNIFICANT CHANGE UP (ref 0–2)
BILIRUB SERPL-MCNC: 13.3 MG/DL — HIGH (ref 0.2–1.2)
BLD GP AB SCN SERPL QL: NEGATIVE — SIGNIFICANT CHANGE UP
BUN SERPL-MCNC: 64 MG/DL — HIGH (ref 7–23)
CALCIUM SERPL-MCNC: 9.4 MG/DL — SIGNIFICANT CHANGE UP (ref 8.4–10.5)
CHLORIDE SERPL-SCNC: 107 MMOL/L — SIGNIFICANT CHANGE UP (ref 98–107)
CO2 SERPL-SCNC: 11 MMOL/L — LOW (ref 22–31)
CREAT SERPL-MCNC: 1.49 MG/DL — HIGH (ref 0.5–1.3)
EGFR: 52 ML/MIN/1.73M2 — LOW
ELLIPTOCYTES BLD QL SMEAR: SLIGHT — SIGNIFICANT CHANGE UP
EOSINOPHIL # BLD AUTO: 0 K/UL — SIGNIFICANT CHANGE UP (ref 0–0.5)
EOSINOPHIL NFR BLD AUTO: 0 % — SIGNIFICANT CHANGE UP (ref 0–6)
GIANT PLATELETS BLD QL SMEAR: PRESENT — SIGNIFICANT CHANGE UP
GLUCOSE SERPL-MCNC: 126 MG/DL — HIGH (ref 70–99)
HCT VFR BLD CALC: 20.1 % — CRITICAL LOW (ref 39–50)
HCT VFR BLD CALC: 20.6 % — CRITICAL LOW (ref 39–50)
HGB BLD-MCNC: 7 G/DL — CRITICAL LOW (ref 13–17)
HGB BLD-MCNC: 7.2 G/DL — LOW (ref 13–17)
HYPOCHROMIA BLD QL: SIGNIFICANT CHANGE UP
IANC: 7.58 K/UL — HIGH (ref 1.8–7.4)
INR BLD: 1.42 RATIO — HIGH (ref 0.85–1.18)
LYMPHOCYTES # BLD AUTO: 18.3 % — SIGNIFICANT CHANGE UP (ref 13–44)
LYMPHOCYTES # BLD AUTO: 2.42 K/UL — SIGNIFICANT CHANGE UP (ref 1–3.3)
MACROCYTES BLD QL: SIGNIFICANT CHANGE UP
MANUAL SMEAR VERIFICATION: SIGNIFICANT CHANGE UP
MCHC RBC-ENTMCNC: 33.8 PG — SIGNIFICANT CHANGE UP (ref 27–34)
MCHC RBC-ENTMCNC: 34.3 PG — HIGH (ref 27–34)
MCHC RBC-ENTMCNC: 34.8 GM/DL — SIGNIFICANT CHANGE UP (ref 32–36)
MCHC RBC-ENTMCNC: 35 GM/DL — SIGNIFICANT CHANGE UP (ref 32–36)
MCV RBC AUTO: 96.7 FL — SIGNIFICANT CHANGE UP (ref 80–100)
MCV RBC AUTO: 98.5 FL — SIGNIFICANT CHANGE UP (ref 80–100)
METAMYELOCYTES # FLD: 1.7 % — HIGH (ref 0–1)
MICROCYTES BLD QL: SLIGHT — SIGNIFICANT CHANGE UP
MONOCYTES # BLD AUTO: 1.72 K/UL — HIGH (ref 0–0.9)
MONOCYTES NFR BLD AUTO: 13 % — SIGNIFICANT CHANGE UP (ref 2–14)
MYELOCYTES NFR BLD: 0.9 % — HIGH (ref 0–0)
NEUTROPHILS # BLD AUTO: 8.51 K/UL — HIGH (ref 1.8–7.4)
NEUTROPHILS NFR BLD AUTO: 59.1 % — SIGNIFICANT CHANGE UP (ref 43–77)
NEUTS BAND # BLD: 5.2 % — SIGNIFICANT CHANGE UP (ref 0–6)
NRBC # BLD: 0 /100 WBCS — SIGNIFICANT CHANGE UP (ref 0–0)
NRBC # FLD: 0 K/UL — SIGNIFICANT CHANGE UP (ref 0–0)
OVALOCYTES BLD QL SMEAR: SLIGHT — SIGNIFICANT CHANGE UP
PLAT MORPH BLD: NORMAL — SIGNIFICANT CHANGE UP
PLATELET # BLD AUTO: 56 K/UL — LOW (ref 150–400)
PLATELET # BLD AUTO: 61 K/UL — LOW (ref 150–400)
PLATELET COUNT - ESTIMATE: ABNORMAL
POIKILOCYTOSIS BLD QL AUTO: SIGNIFICANT CHANGE UP
POLYCHROMASIA BLD QL SMEAR: SLIGHT — SIGNIFICANT CHANGE UP
POTASSIUM SERPL-MCNC: 4.2 MMOL/L — SIGNIFICANT CHANGE UP (ref 3.5–5.3)
POTASSIUM SERPL-SCNC: 4.2 MMOL/L — SIGNIFICANT CHANGE UP (ref 3.5–5.3)
PROMYELOCYTES # FLD: 0.9 % — HIGH (ref 0–0)
PROT SERPL-MCNC: 7 G/DL — SIGNIFICANT CHANGE UP (ref 6–8.3)
PROTHROM AB SERPL-ACNC: 15.7 SEC — HIGH (ref 9.5–13)
RBC # BLD: 2.04 M/UL — LOW (ref 4.2–5.8)
RBC # BLD: 2.13 M/UL — LOW (ref 4.2–5.8)
RBC # FLD: 26.6 % — HIGH (ref 10.3–14.5)
RBC # FLD: 27.9 % — HIGH (ref 10.3–14.5)
RBC BLD AUTO: ABNORMAL
RH IG SCN BLD-IMP: POSITIVE — SIGNIFICANT CHANGE UP
SCHISTOCYTES BLD QL AUTO: SLIGHT — SIGNIFICANT CHANGE UP
SODIUM SERPL-SCNC: 134 MMOL/L — LOW (ref 135–145)
TARGETS BLD QL SMEAR: SIGNIFICANT CHANGE UP
VARIANT LYMPHS # BLD: 0.9 % — SIGNIFICANT CHANGE UP (ref 0–6)
WBC # BLD: 10.37 K/UL — SIGNIFICANT CHANGE UP (ref 3.8–10.5)
WBC # BLD: 13.24 K/UL — HIGH (ref 3.8–10.5)
WBC # FLD AUTO: 10.37 K/UL — SIGNIFICANT CHANGE UP (ref 3.8–10.5)
WBC # FLD AUTO: 13.24 K/UL — HIGH (ref 3.8–10.5)

## 2024-06-13 PROCEDURE — 99222 1ST HOSP IP/OBS MODERATE 55: CPT | Mod: GC

## 2024-06-13 PROCEDURE — 99285 EMERGENCY DEPT VISIT HI MDM: CPT | Mod: GC

## 2024-06-13 PROCEDURE — 99223 1ST HOSP IP/OBS HIGH 75: CPT | Mod: GC

## 2024-06-13 PROCEDURE — 93971 EXTREMITY STUDY: CPT | Mod: 26,LT

## 2024-06-13 PROCEDURE — 74177 CT ABD & PELVIS W/CONTRAST: CPT | Mod: 26,MC

## 2024-06-13 RX ORDER — ATORVASTATIN CALCIUM 80 MG/1
1 TABLET, FILM COATED ORAL
Refills: 0 | DISCHARGE

## 2024-06-13 RX ORDER — DEXTROSE 30 % IN WATER 30 %
12.5 VIAL (ML) INTRAVENOUS ONCE
Refills: 0 | Status: DISCONTINUED | OUTPATIENT
Start: 2024-06-13 | End: 2024-07-03

## 2024-06-13 RX ORDER — INSULIN LISPRO 100 [IU]/ML
INJECTION, SOLUTION SUBCUTANEOUS
Refills: 0 | Status: DISCONTINUED | OUTPATIENT
Start: 2024-06-13 | End: 2024-06-17

## 2024-06-13 RX ORDER — DEXTROSE 30 % IN WATER 30 %
25 VIAL (ML) INTRAVENOUS ONCE
Refills: 0 | Status: DISCONTINUED | OUTPATIENT
Start: 2024-06-13 | End: 2024-07-03

## 2024-06-13 RX ORDER — DEXTROSE 30 % IN WATER 30 %
15 VIAL (ML) INTRAVENOUS ONCE
Refills: 0 | Status: DISCONTINUED | OUTPATIENT
Start: 2024-06-13 | End: 2024-07-03

## 2024-06-13 RX ORDER — ALLOPURINOL 300 MG/1
100 TABLET ORAL DAILY
Refills: 0 | Status: DISCONTINUED | OUTPATIENT
Start: 2024-06-13 | End: 2024-07-03

## 2024-06-13 RX ORDER — DEXTROSE MONOHYDRATE AND SODIUM CHLORIDE 5; .3 G/100ML; G/100ML
1000 INJECTION, SOLUTION INTRAVENOUS
Refills: 0 | Status: DISCONTINUED | OUTPATIENT
Start: 2024-06-13 | End: 2024-07-03

## 2024-06-13 RX ORDER — PANTOPRAZOLE SODIUM 40 MG/10ML
40 INJECTION, POWDER, FOR SOLUTION INTRAVENOUS
Refills: 0 | Status: DISCONTINUED | OUTPATIENT
Start: 2024-06-13 | End: 2024-06-17

## 2024-06-13 RX ORDER — GLUCAGON HYDROCHLORIDE 1 MG/ML
1 INJECTION, POWDER, FOR SOLUTION INTRAMUSCULAR; INTRAVENOUS; SUBCUTANEOUS ONCE
Refills: 0 | Status: DISCONTINUED | OUTPATIENT
Start: 2024-06-13 | End: 2024-07-03

## 2024-06-13 RX ORDER — DEXTROSE MONOHYDRATE 100 MG/ML
125 INJECTION, SOLUTION INTRAVENOUS ONCE
Refills: 0 | Status: DISCONTINUED | OUTPATIENT
Start: 2024-06-13 | End: 2024-07-03

## 2024-06-14 LAB
A1C WITH ESTIMATED AVERAGE GLUCOSE RESULT: 4.8 % — SIGNIFICANT CHANGE UP (ref 4–5.6)
ADD ON TEST-SPECIMEN IN LAB: SIGNIFICANT CHANGE UP
ALBUMIN SERPL ELPH-MCNC: 3 G/DL — LOW (ref 3.3–5)
ALP SERPL-CCNC: 919 U/L — HIGH (ref 40–120)
ALT FLD-CCNC: 145 U/L — HIGH (ref 4–41)
ANION GAP SERPL CALC-SCNC: 14 MMOL/L — SIGNIFICANT CHANGE UP (ref 7–14)
AST SERPL-CCNC: 159 U/L — HIGH (ref 4–40)
BASOPHILS # BLD AUTO: 0.02 K/UL — SIGNIFICANT CHANGE UP (ref 0–0.2)
BASOPHILS NFR BLD AUTO: 0.2 % — SIGNIFICANT CHANGE UP (ref 0–2)
BILIRUB SERPL-MCNC: 11.4 MG/DL — HIGH (ref 0.2–1.2)
BUN SERPL-MCNC: 59 MG/DL — HIGH (ref 7–23)
CALCIUM SERPL-MCNC: 9.5 MG/DL — SIGNIFICANT CHANGE UP (ref 8.4–10.5)
CHLORIDE SERPL-SCNC: 109 MMOL/L — HIGH (ref 98–107)
CO2 SERPL-SCNC: 12 MMOL/L — LOW (ref 22–31)
CREAT SERPL-MCNC: 1.5 MG/DL — HIGH (ref 0.5–1.3)
EGFR: 51 ML/MIN/1.73M2 — LOW
EOSINOPHIL # BLD AUTO: 0.03 K/UL — SIGNIFICANT CHANGE UP (ref 0–0.5)
EOSINOPHIL NFR BLD AUTO: 0.3 % — SIGNIFICANT CHANGE UP (ref 0–6)
ESTIMATED AVERAGE GLUCOSE: 91 — SIGNIFICANT CHANGE UP
GLUCOSE BLDC GLUCOMTR-MCNC: 129 MG/DL — HIGH (ref 70–99)
GLUCOSE BLDC GLUCOMTR-MCNC: 141 MG/DL — HIGH (ref 70–99)
GLUCOSE BLDC GLUCOMTR-MCNC: 159 MG/DL — HIGH (ref 70–99)
GLUCOSE SERPL-MCNC: 103 MG/DL — HIGH (ref 70–99)
HAPTOGLOB SERPL-MCNC: 114 MG/DL — SIGNIFICANT CHANGE UP (ref 34–200)
HCT VFR BLD CALC: 19.4 % — CRITICAL LOW (ref 39–50)
HCT VFR BLD CALC: 21 % — CRITICAL LOW (ref 39–50)
HGB BLD-MCNC: 7 G/DL — CRITICAL LOW (ref 13–17)
HGB BLD-MCNC: 7.7 G/DL — LOW (ref 13–17)
IANC: 6.05 K/UL — SIGNIFICANT CHANGE UP (ref 1.8–7.4)
IMM GRANULOCYTES NFR BLD AUTO: 8.6 % — HIGH (ref 0–0.9)
LDH SERPL L TO P-CCNC: 102 U/L — LOW (ref 135–225)
LYMPHOCYTES # BLD AUTO: 2.37 K/UL — SIGNIFICANT CHANGE UP (ref 1–3.3)
LYMPHOCYTES # BLD AUTO: 22 % — SIGNIFICANT CHANGE UP (ref 13–44)
MAGNESIUM SERPL-MCNC: 1.5 MG/DL — LOW (ref 1.6–2.6)
MCHC RBC-ENTMCNC: 33.8 PG — SIGNIFICANT CHANGE UP (ref 27–34)
MCHC RBC-ENTMCNC: 33.9 PG — SIGNIFICANT CHANGE UP (ref 27–34)
MCHC RBC-ENTMCNC: 36.1 GM/DL — HIGH (ref 32–36)
MCHC RBC-ENTMCNC: 36.7 GM/DL — HIGH (ref 32–36)
MCV RBC AUTO: 92.5 FL — SIGNIFICANT CHANGE UP (ref 80–100)
MCV RBC AUTO: 93.7 FL — SIGNIFICANT CHANGE UP (ref 80–100)
MONOCYTES # BLD AUTO: 1.36 K/UL — HIGH (ref 0–0.9)
MONOCYTES NFR BLD AUTO: 12.6 % — SIGNIFICANT CHANGE UP (ref 2–14)
NEUTROPHILS # BLD AUTO: 6.05 K/UL — SIGNIFICANT CHANGE UP (ref 1.8–7.4)
NEUTROPHILS NFR BLD AUTO: 56.3 % — SIGNIFICANT CHANGE UP (ref 43–77)
NRBC # BLD: 0 /100 WBCS — SIGNIFICANT CHANGE UP (ref 0–0)
NRBC # BLD: 0 /100 WBCS — SIGNIFICANT CHANGE UP (ref 0–0)
NRBC # FLD: 0 K/UL — SIGNIFICANT CHANGE UP (ref 0–0)
NRBC # FLD: 0 K/UL — SIGNIFICANT CHANGE UP (ref 0–0)
PHOSPHATE SERPL-MCNC: 4.3 MG/DL — SIGNIFICANT CHANGE UP (ref 2.5–4.5)
PLATELET # BLD AUTO: 58 K/UL — LOW (ref 150–400)
PLATELET # BLD AUTO: 62 K/UL — LOW (ref 150–400)
POTASSIUM SERPL-MCNC: 4.6 MMOL/L — SIGNIFICANT CHANGE UP (ref 3.5–5.3)
POTASSIUM SERPL-SCNC: 4.6 MMOL/L — SIGNIFICANT CHANGE UP (ref 3.5–5.3)
PROT SERPL-MCNC: 6.1 G/DL — SIGNIFICANT CHANGE UP (ref 6–8.3)
RBC # BLD: 2.07 M/UL — LOW (ref 4.2–5.8)
RBC # BLD: 2.07 M/UL — LOW (ref 4.2–5.8)
RBC # BLD: 2.27 M/UL — LOW (ref 4.2–5.8)
RBC # FLD: 24.4 % — HIGH (ref 10.3–14.5)
RBC # FLD: 26.5 % — HIGH (ref 10.3–14.5)
RETICS #: 99.2 K/UL — SIGNIFICANT CHANGE UP (ref 25–125)
RETICS/RBC NFR: 4.8 % — HIGH (ref 0.5–2.5)
SODIUM SERPL-SCNC: 135 MMOL/L — SIGNIFICANT CHANGE UP (ref 135–145)
WBC # BLD: 10.76 K/UL — HIGH (ref 3.8–10.5)
WBC # BLD: 9.78 K/UL — SIGNIFICANT CHANGE UP (ref 3.8–10.5)
WBC # FLD AUTO: 10.76 K/UL — HIGH (ref 3.8–10.5)
WBC # FLD AUTO: 9.78 K/UL — SIGNIFICANT CHANGE UP (ref 3.8–10.5)

## 2024-06-14 PROCEDURE — 99233 SBSQ HOSP IP/OBS HIGH 50: CPT | Mod: GC

## 2024-06-14 PROCEDURE — 99223 1ST HOSP IP/OBS HIGH 75: CPT | Mod: GC

## 2024-06-14 RX ORDER — SILVER NITRATE APPLICATORS 25; 75 MG/100MG; MG/100MG
1 STICK TOPICAL ONCE
Refills: 0 | Status: COMPLETED | OUTPATIENT
Start: 2024-06-14 | End: 2024-06-14

## 2024-06-14 RX ORDER — SILVER NITRATE APPLICATORS 25; 75 MG/100MG; MG/100MG
1 STICK TOPICAL ONCE
Refills: 0 | Status: DISCONTINUED | OUTPATIENT
Start: 2024-06-14 | End: 2024-06-14

## 2024-06-14 RX ORDER — MAGNESIUM SULFATE 100 %
2 POWDER (GRAM) MISCELLANEOUS ONCE
Refills: 0 | Status: COMPLETED | OUTPATIENT
Start: 2024-06-14 | End: 2024-06-14

## 2024-06-14 RX ORDER — DEXTROSE MONOHYDRATE AND SODIUM CHLORIDE 5; .3 G/100ML; G/100ML
1000 INJECTION, SOLUTION INTRAVENOUS
Refills: 0 | Status: DISCONTINUED | OUTPATIENT
Start: 2024-06-14 | End: 2024-06-16

## 2024-06-14 RX ADMIN — PANTOPRAZOLE SODIUM 40 MILLIGRAM(S): 40 INJECTION, POWDER, FOR SOLUTION INTRAVENOUS at 05:37

## 2024-06-14 RX ADMIN — ALLOPURINOL 100 MILLIGRAM(S): 300 TABLET ORAL at 12:03

## 2024-06-14 RX ADMIN — Medication 25 GRAM(S): at 12:02

## 2024-06-14 RX ADMIN — Medication 1 TABLET(S): at 12:03

## 2024-06-14 RX ADMIN — DEXTROSE MONOHYDRATE AND SODIUM CHLORIDE 100 MILLILITER(S): 5; .3 INJECTION, SOLUTION INTRAVENOUS at 12:02

## 2024-06-14 RX ADMIN — INSULIN LISPRO 1: 100 INJECTION, SOLUTION SUBCUTANEOUS at 12:15

## 2024-06-15 DIAGNOSIS — K92.2 GASTROINTESTINAL HEMORRHAGE, UNSPECIFIED: ICD-10-CM

## 2024-06-15 PROBLEM — K57.92 DIVERTICULITIS OF INTESTINE, PART UNSPECIFIED, WITHOUT PERFORATION OR ABSCESS WITHOUT BLEEDING: Chronic | Status: ACTIVE | Noted: 2024-04-17

## 2024-06-15 PROBLEM — E11.9 TYPE 2 DIABETES MELLITUS WITHOUT COMPLICATIONS: Chronic | Status: ACTIVE | Noted: 2024-04-17

## 2024-06-15 LAB
ANION GAP SERPL CALC-SCNC: 10 MMOL/L — SIGNIFICANT CHANGE UP (ref 7–14)
APTT BLD: 37.5 SEC — HIGH (ref 24.5–35.6)
BUN SERPL-MCNC: 54 MG/DL — HIGH (ref 7–23)
CALCIUM SERPL-MCNC: 9.1 MG/DL — SIGNIFICANT CHANGE UP (ref 8.4–10.5)
CHLORIDE SERPL-SCNC: 113 MMOL/L — HIGH (ref 98–107)
CO2 SERPL-SCNC: 12 MMOL/L — LOW (ref 22–31)
CREAT SERPL-MCNC: 1.33 MG/DL — HIGH (ref 0.5–1.3)
D DIMER BLD IA.RAPID-MCNC: <150 NG/ML DDU — SIGNIFICANT CHANGE UP
DAT POLY-SP REAG RBC QL: NEGATIVE — SIGNIFICANT CHANGE UP
EGFR: 59 ML/MIN/1.73M2 — LOW
FIBRINOGEN PPP-MCNC: 460 MG/DL — SIGNIFICANT CHANGE UP (ref 200–465)
GLUCOSE BLDC GLUCOMTR-MCNC: 112 MG/DL — HIGH (ref 70–99)
GLUCOSE BLDC GLUCOMTR-MCNC: 120 MG/DL — HIGH (ref 70–99)
GLUCOSE BLDC GLUCOMTR-MCNC: 135 MG/DL — HIGH (ref 70–99)
GLUCOSE BLDC GLUCOMTR-MCNC: 186 MG/DL — HIGH (ref 70–99)
GLUCOSE SERPL-MCNC: 101 MG/DL — HIGH (ref 70–99)
HCT VFR BLD CALC: 21.1 % — LOW (ref 39–50)
HGB BLD-MCNC: 7.5 G/DL — LOW (ref 13–17)
INR BLD: 1.37 RATIO — HIGH (ref 0.85–1.18)
MAGNESIUM SERPL-MCNC: 1.7 MG/DL — SIGNIFICANT CHANGE UP (ref 1.6–2.6)
MCHC RBC-ENTMCNC: 33.5 PG — SIGNIFICANT CHANGE UP (ref 27–34)
MCHC RBC-ENTMCNC: 35.5 GM/DL — SIGNIFICANT CHANGE UP (ref 32–36)
MCV RBC AUTO: 94.2 FL — SIGNIFICANT CHANGE UP (ref 80–100)
NRBC # BLD: 0 /100 WBCS — SIGNIFICANT CHANGE UP (ref 0–0)
NRBC # FLD: 0 K/UL — SIGNIFICANT CHANGE UP (ref 0–0)
PHOSPHATE SERPL-MCNC: 3.9 MG/DL — SIGNIFICANT CHANGE UP (ref 2.5–4.5)
PLATELET # BLD AUTO: 53 K/UL — LOW (ref 150–400)
POTASSIUM SERPL-MCNC: 3.9 MMOL/L — SIGNIFICANT CHANGE UP (ref 3.5–5.3)
POTASSIUM SERPL-SCNC: 3.9 MMOL/L — SIGNIFICANT CHANGE UP (ref 3.5–5.3)
PROTHROM AB SERPL-ACNC: 15.3 SEC — HIGH (ref 9.5–13)
RBC # BLD: 2.24 M/UL — LOW (ref 4.2–5.8)
RBC # FLD: 25 % — HIGH (ref 10.3–14.5)
SODIUM SERPL-SCNC: 135 MMOL/L — SIGNIFICANT CHANGE UP (ref 135–145)
WBC # BLD: 8.36 K/UL — SIGNIFICANT CHANGE UP (ref 3.8–10.5)
WBC # FLD AUTO: 8.36 K/UL — SIGNIFICANT CHANGE UP (ref 3.8–10.5)

## 2024-06-15 PROCEDURE — 99233 SBSQ HOSP IP/OBS HIGH 50: CPT

## 2024-06-15 RX ADMIN — ALLOPURINOL 100 MILLIGRAM(S): 300 TABLET ORAL at 12:22

## 2024-06-15 RX ADMIN — PANTOPRAZOLE SODIUM 40 MILLIGRAM(S): 40 INJECTION, POWDER, FOR SOLUTION INTRAVENOUS at 06:08

## 2024-06-15 RX ADMIN — Medication 1 TABLET(S): at 12:22

## 2024-06-15 RX ADMIN — INSULIN LISPRO 1: 100 INJECTION, SOLUTION SUBCUTANEOUS at 18:30

## 2024-06-16 LAB
ALBUMIN SERPL ELPH-MCNC: 2.9 G/DL — LOW (ref 3.3–5)
ALP SERPL-CCNC: 1173 U/L — HIGH (ref 40–120)
ALT FLD-CCNC: 198 U/L — HIGH (ref 4–41)
ANION GAP SERPL CALC-SCNC: 14 MMOL/L — SIGNIFICANT CHANGE UP (ref 7–14)
AST SERPL-CCNC: 263 U/L — HIGH (ref 4–40)
BASOPHILS # BLD AUTO: 0.01 K/UL — SIGNIFICANT CHANGE UP (ref 0–0.2)
BASOPHILS NFR BLD AUTO: 0.1 % — SIGNIFICANT CHANGE UP (ref 0–2)
BILIRUB SERPL-MCNC: 12.9 MG/DL — HIGH (ref 0.2–1.2)
BLD GP AB SCN SERPL QL: NEGATIVE — SIGNIFICANT CHANGE UP
BUN SERPL-MCNC: 58 MG/DL — HIGH (ref 7–23)
CALCIUM SERPL-MCNC: 9.2 MG/DL — SIGNIFICANT CHANGE UP (ref 8.4–10.5)
CHLORIDE SERPL-SCNC: 109 MMOL/L — HIGH (ref 98–107)
CO2 SERPL-SCNC: 13 MMOL/L — LOW (ref 22–31)
CREAT SERPL-MCNC: 1.42 MG/DL — HIGH (ref 0.5–1.3)
EGFR: 55 ML/MIN/1.73M2 — LOW
EOSINOPHIL # BLD AUTO: 0.03 K/UL — SIGNIFICANT CHANGE UP (ref 0–0.5)
EOSINOPHIL NFR BLD AUTO: 0.3 % — SIGNIFICANT CHANGE UP (ref 0–6)
FERRITIN SERPL-MCNC: 699 NG/ML — HIGH (ref 30–400)
GLUCOSE BLDC GLUCOMTR-MCNC: 128 MG/DL — HIGH (ref 70–99)
GLUCOSE BLDC GLUCOMTR-MCNC: 139 MG/DL — HIGH (ref 70–99)
GLUCOSE BLDC GLUCOMTR-MCNC: 144 MG/DL — HIGH (ref 70–99)
GLUCOSE BLDC GLUCOMTR-MCNC: 151 MG/DL — HIGH (ref 70–99)
GLUCOSE SERPL-MCNC: 100 MG/DL — HIGH (ref 70–99)
HCT VFR BLD CALC: 20.3 % — CRITICAL LOW (ref 39–50)
HCT VFR BLD CALC: 21.6 % — LOW (ref 39–50)
HGB BLD-MCNC: 7.4 G/DL — LOW (ref 13–17)
HGB BLD-MCNC: 7.6 G/DL — LOW (ref 13–17)
IANC: 4.74 K/UL — SIGNIFICANT CHANGE UP (ref 1.8–7.4)
IMM GRANULOCYTES NFR BLD AUTO: 6.7 % — HIGH (ref 0–0.9)
IRON SATN MFR SERPL: 40 % — SIGNIFICANT CHANGE UP (ref 14–50)
IRON SATN MFR SERPL: 80 UG/DL — SIGNIFICANT CHANGE UP (ref 45–165)
LYMPHOCYTES # BLD AUTO: 1.93 K/UL — SIGNIFICANT CHANGE UP (ref 1–3.3)
LYMPHOCYTES # BLD AUTO: 22.4 % — SIGNIFICANT CHANGE UP (ref 13–44)
MAGNESIUM SERPL-MCNC: 1.5 MG/DL — LOW (ref 1.6–2.6)
MCHC RBC-ENTMCNC: 33 PG — SIGNIFICANT CHANGE UP (ref 27–34)
MCHC RBC-ENTMCNC: 33.2 PG — SIGNIFICANT CHANGE UP (ref 27–34)
MCHC RBC-ENTMCNC: 35.2 GM/DL — SIGNIFICANT CHANGE UP (ref 32–36)
MCHC RBC-ENTMCNC: 36.5 GM/DL — HIGH (ref 32–36)
MCV RBC AUTO: 91 FL — SIGNIFICANT CHANGE UP (ref 80–100)
MCV RBC AUTO: 93.9 FL — SIGNIFICANT CHANGE UP (ref 80–100)
MONOCYTES # BLD AUTO: 1.33 K/UL — HIGH (ref 0–0.9)
MONOCYTES NFR BLD AUTO: 15.4 % — HIGH (ref 2–14)
NEUTROPHILS # BLD AUTO: 4.74 K/UL — SIGNIFICANT CHANGE UP (ref 1.8–7.4)
NEUTROPHILS NFR BLD AUTO: 55.1 % — SIGNIFICANT CHANGE UP (ref 43–77)
NRBC # BLD: 0 /100 WBCS — SIGNIFICANT CHANGE UP (ref 0–0)
NRBC # BLD: 0 /100 WBCS — SIGNIFICANT CHANGE UP (ref 0–0)
NRBC # FLD: 0 K/UL — SIGNIFICANT CHANGE UP (ref 0–0)
NRBC # FLD: 0 K/UL — SIGNIFICANT CHANGE UP (ref 0–0)
PHOSPHATE SERPL-MCNC: 4.2 MG/DL — SIGNIFICANT CHANGE UP (ref 2.5–4.5)
PLATELET # BLD AUTO: 56 K/UL — LOW (ref 150–400)
PLATELET # BLD AUTO: 58 K/UL — LOW (ref 150–400)
POTASSIUM SERPL-MCNC: 4.4 MMOL/L — SIGNIFICANT CHANGE UP (ref 3.5–5.3)
POTASSIUM SERPL-SCNC: 4.4 MMOL/L — SIGNIFICANT CHANGE UP (ref 3.5–5.3)
PROT SERPL-MCNC: 5.9 G/DL — LOW (ref 6–8.3)
RBC # BLD: 2.23 M/UL — LOW (ref 4.2–5.8)
RBC # BLD: 2.3 M/UL — LOW (ref 4.2–5.8)
RBC # FLD: 24.3 % — HIGH (ref 10.3–14.5)
RBC # FLD: 25.2 % — HIGH (ref 10.3–14.5)
RH IG SCN BLD-IMP: POSITIVE — SIGNIFICANT CHANGE UP
SODIUM SERPL-SCNC: 136 MMOL/L — SIGNIFICANT CHANGE UP (ref 135–145)
TIBC SERPL-MCNC: 202 UG/DL — LOW (ref 220–430)
UIBC SERPL-MCNC: 122 UG/DL — SIGNIFICANT CHANGE UP (ref 110–370)
WBC # BLD: 11.38 K/UL — HIGH (ref 3.8–10.5)
WBC # BLD: 8.62 K/UL — SIGNIFICANT CHANGE UP (ref 3.8–10.5)
WBC # FLD AUTO: 11.38 K/UL — HIGH (ref 3.8–10.5)
WBC # FLD AUTO: 8.62 K/UL — SIGNIFICANT CHANGE UP (ref 3.8–10.5)

## 2024-06-16 PROCEDURE — 99232 SBSQ HOSP IP/OBS MODERATE 35: CPT

## 2024-06-16 RX ORDER — MAGNESIUM SULFATE 100 %
2 POWDER (GRAM) MISCELLANEOUS ONCE
Refills: 0 | Status: COMPLETED | OUTPATIENT
Start: 2024-06-16 | End: 2024-06-16

## 2024-06-16 RX ADMIN — INSULIN LISPRO 1: 100 INJECTION, SOLUTION SUBCUTANEOUS at 12:50

## 2024-06-16 RX ADMIN — Medication 25 GRAM(S): at 12:47

## 2024-06-16 RX ADMIN — Medication 1 TABLET(S): at 12:51

## 2024-06-16 RX ADMIN — ALLOPURINOL 100 MILLIGRAM(S): 300 TABLET ORAL at 12:51

## 2024-06-16 RX ADMIN — PANTOPRAZOLE SODIUM 40 MILLIGRAM(S): 40 INJECTION, POWDER, FOR SOLUTION INTRAVENOUS at 06:56

## 2024-06-16 RX ADMIN — Medication 25 GRAM(S): at 09:00

## 2024-06-17 DIAGNOSIS — Z93.9 ARTIFICIAL OPENING STATUS, UNSPECIFIED: ICD-10-CM

## 2024-06-17 DIAGNOSIS — T14.8XXA OTHER INJURY OF UNSPECIFIED BODY REGION, INITIAL ENCOUNTER: ICD-10-CM

## 2024-06-17 LAB
ALBUMIN SERPL ELPH-MCNC: 2.6 G/DL — LOW (ref 3.3–5)
ALP SERPL-CCNC: 1154 U/L — HIGH (ref 40–120)
ALT FLD-CCNC: 210 U/L — HIGH (ref 4–41)
ANION GAP SERPL CALC-SCNC: 12 MMOL/L — SIGNIFICANT CHANGE UP (ref 7–14)
ANISOCYTOSIS BLD QL: SIGNIFICANT CHANGE UP
AST SERPL-CCNC: 253 U/L — HIGH (ref 4–40)
BASOPHILS # BLD AUTO: 0 K/UL — SIGNIFICANT CHANGE UP (ref 0–0.2)
BASOPHILS NFR BLD AUTO: 0 % — SIGNIFICANT CHANGE UP (ref 0–2)
BILIRUB SERPL-MCNC: 13 MG/DL — HIGH (ref 0.2–1.2)
BLD GP AB SCN SERPL QL: NEGATIVE — SIGNIFICANT CHANGE UP
BUN SERPL-MCNC: 54 MG/DL — HIGH (ref 7–23)
BURR CELLS BLD QL SMEAR: PRESENT — SIGNIFICANT CHANGE UP
CALCIUM SERPL-MCNC: 9.3 MG/DL — SIGNIFICANT CHANGE UP (ref 8.4–10.5)
CHLORIDE SERPL-SCNC: 109 MMOL/L — HIGH (ref 98–107)
CO2 SERPL-SCNC: 13 MMOL/L — LOW (ref 22–31)
CREAT SERPL-MCNC: 1.49 MG/DL — HIGH (ref 0.5–1.3)
DACRYOCYTES BLD QL SMEAR: SLIGHT — SIGNIFICANT CHANGE UP
EGFR: 52 ML/MIN/1.73M2 — LOW
EOSINOPHIL # BLD AUTO: 0 K/UL — SIGNIFICANT CHANGE UP (ref 0–0.5)
EOSINOPHIL NFR BLD AUTO: 0 % — SIGNIFICANT CHANGE UP (ref 0–6)
GIANT PLATELETS BLD QL SMEAR: PRESENT — SIGNIFICANT CHANGE UP
GLUCOSE BLDC GLUCOMTR-MCNC: 109 MG/DL — HIGH (ref 70–99)
GLUCOSE BLDC GLUCOMTR-MCNC: 122 MG/DL — HIGH (ref 70–99)
GLUCOSE BLDC GLUCOMTR-MCNC: 180 MG/DL — HIGH (ref 70–99)
GLUCOSE BLDC GLUCOMTR-MCNC: 190 MG/DL — HIGH (ref 70–99)
GLUCOSE SERPL-MCNC: 92 MG/DL — SIGNIFICANT CHANGE UP (ref 70–99)
HCT VFR BLD CALC: 19.7 % — CRITICAL LOW (ref 39–50)
HCT VFR BLD CALC: 20.6 % — CRITICAL LOW (ref 39–50)
HCT VFR BLD CALC: 22.6 % — LOW (ref 39–50)
HGB BLD-MCNC: 6.9 G/DL — CRITICAL LOW (ref 13–17)
HGB BLD-MCNC: 7.2 G/DL — LOW (ref 13–17)
HGB BLD-MCNC: 8 G/DL — LOW (ref 13–17)
HYPOCHROMIA BLD QL: SLIGHT — SIGNIFICANT CHANGE UP
IANC: 5.22 K/UL — SIGNIFICANT CHANGE UP (ref 1.8–7.4)
LYMPHOCYTES # BLD AUTO: 1.51 K/UL — SIGNIFICANT CHANGE UP (ref 1–3.3)
LYMPHOCYTES # BLD AUTO: 16.7 % — SIGNIFICANT CHANGE UP (ref 13–44)
MACROCYTES BLD QL: SIGNIFICANT CHANGE UP
MAGNESIUM SERPL-MCNC: 1.9 MG/DL — SIGNIFICANT CHANGE UP (ref 1.6–2.6)
MANUAL SMEAR VERIFICATION: SIGNIFICANT CHANGE UP
MCHC RBC-ENTMCNC: 32.5 PG — SIGNIFICANT CHANGE UP (ref 27–34)
MCHC RBC-ENTMCNC: 32.7 PG — SIGNIFICANT CHANGE UP (ref 27–34)
MCHC RBC-ENTMCNC: 33.1 PG — SIGNIFICANT CHANGE UP (ref 27–34)
MCHC RBC-ENTMCNC: 35 GM/DL — SIGNIFICANT CHANGE UP (ref 32–36)
MCHC RBC-ENTMCNC: 35 GM/DL — SIGNIFICANT CHANGE UP (ref 32–36)
MCHC RBC-ENTMCNC: 35.4 GM/DL — SIGNIFICANT CHANGE UP (ref 32–36)
MCV RBC AUTO: 92.9 FL — SIGNIFICANT CHANGE UP (ref 80–100)
MCV RBC AUTO: 93.4 FL — SIGNIFICANT CHANGE UP (ref 80–100)
MCV RBC AUTO: 93.6 FL — SIGNIFICANT CHANGE UP (ref 80–100)
MICROCYTES BLD QL: SLIGHT — SIGNIFICANT CHANGE UP
MONOCYTES # BLD AUTO: 0.95 K/UL — HIGH (ref 0–0.9)
MONOCYTES NFR BLD AUTO: 10.5 % — SIGNIFICANT CHANGE UP (ref 2–14)
MYELOCYTES NFR BLD: 1.7 % — HIGH (ref 0–0)
NEUTROPHILS # BLD AUTO: 6.42 K/UL — SIGNIFICANT CHANGE UP (ref 1.8–7.4)
NEUTROPHILS NFR BLD AUTO: 70.2 % — SIGNIFICANT CHANGE UP (ref 43–77)
NEUTS BAND # BLD: 0.9 % — SIGNIFICANT CHANGE UP (ref 0–6)
NRBC # BLD: 0 /100 WBCS — SIGNIFICANT CHANGE UP (ref 0–0)
NRBC # BLD: 0 /100 WBCS — SIGNIFICANT CHANGE UP (ref 0–0)
NRBC # BLD: 1 /100 WBCS — HIGH (ref 0–0)
NRBC # FLD: 0 K/UL — SIGNIFICANT CHANGE UP (ref 0–0)
NRBC # FLD: 0 K/UL — SIGNIFICANT CHANGE UP (ref 0–0)
OVALOCYTES BLD QL SMEAR: SLIGHT — SIGNIFICANT CHANGE UP
PHOSPHATE SERPL-MCNC: 3.9 MG/DL — SIGNIFICANT CHANGE UP (ref 2.5–4.5)
PLAT MORPH BLD: ABNORMAL
PLATELET # BLD AUTO: 50 K/UL — LOW (ref 150–400)
PLATELET # BLD AUTO: 54 K/UL — LOW (ref 150–400)
PLATELET # BLD AUTO: 58 K/UL — LOW (ref 150–400)
PLATELET COUNT - ESTIMATE: ABNORMAL
POIKILOCYTOSIS BLD QL AUTO: SLIGHT — SIGNIFICANT CHANGE UP
POLYCHROMASIA BLD QL SMEAR: SLIGHT — SIGNIFICANT CHANGE UP
POTASSIUM SERPL-MCNC: 4.4 MMOL/L — SIGNIFICANT CHANGE UP (ref 3.5–5.3)
POTASSIUM SERPL-SCNC: 4.4 MMOL/L — SIGNIFICANT CHANGE UP (ref 3.5–5.3)
PROT SERPL-MCNC: 5.6 G/DL — LOW (ref 6–8.3)
RBC # BLD: 2.12 M/UL — LOW (ref 4.2–5.8)
RBC # BLD: 2.2 M/UL — LOW (ref 4.2–5.8)
RBC # BLD: 2.42 M/UL — LOW (ref 4.2–5.8)
RBC # FLD: 22.5 % — HIGH (ref 10.3–14.5)
RBC # FLD: 23.7 % — HIGH (ref 10.3–14.5)
RBC # FLD: 24.5 % — HIGH (ref 10.3–14.5)
RBC BLD AUTO: ABNORMAL
RH IG SCN BLD-IMP: POSITIVE — SIGNIFICANT CHANGE UP
SCHISTOCYTES BLD QL AUTO: SLIGHT — SIGNIFICANT CHANGE UP
SODIUM SERPL-SCNC: 134 MMOL/L — LOW (ref 135–145)
TARGETS BLD QL SMEAR: SLIGHT — SIGNIFICANT CHANGE UP
URATE SERPL-MCNC: 6.4 MG/DL — SIGNIFICANT CHANGE UP (ref 3.4–8.8)
WBC # BLD: 10.03 K/UL — SIGNIFICANT CHANGE UP (ref 3.8–10.5)
WBC # BLD: 11.18 K/UL — HIGH (ref 3.8–10.5)
WBC # BLD: 9.03 K/UL — SIGNIFICANT CHANGE UP (ref 3.8–10.5)
WBC # FLD AUTO: 10.03 K/UL — SIGNIFICANT CHANGE UP (ref 3.8–10.5)
WBC # FLD AUTO: 11.18 K/UL — HIGH (ref 3.8–10.5)
WBC # FLD AUTO: 9.03 K/UL — SIGNIFICANT CHANGE UP (ref 3.8–10.5)

## 2024-06-17 PROCEDURE — 88291 CYTO/MOLECULAR REPORT: CPT | Mod: 59

## 2024-06-17 PROCEDURE — 99233 SBSQ HOSP IP/OBS HIGH 50: CPT

## 2024-06-17 PROCEDURE — 88189 FLOWCYTOMETRY/READ 16 & >: CPT | Mod: 59

## 2024-06-17 PROCEDURE — 99233 SBSQ HOSP IP/OBS HIGH 50: CPT | Mod: GC

## 2024-06-17 PROCEDURE — 88313 SPECIAL STAINS GROUP 2: CPT | Mod: 26,59

## 2024-06-17 PROCEDURE — 88189 FLOWCYTOMETRY/READ 16 & >: CPT

## 2024-06-17 PROCEDURE — G0452: CPT | Mod: 26

## 2024-06-17 RX ORDER — INSULIN LISPRO 100 [IU]/ML
INJECTION, SOLUTION SUBCUTANEOUS EVERY 6 HOURS
Refills: 0 | Status: DISCONTINUED | OUTPATIENT
Start: 2024-06-17 | End: 2024-06-20

## 2024-06-17 RX ORDER — LIDOCAINE HYDROCHLORIDE 20 MG/ML
10 INJECTION, SOLUTION EPIDURAL; INFILTRATION; INTRACAUDAL; PERINEURAL ONCE
Refills: 0 | Status: COMPLETED | OUTPATIENT
Start: 2024-06-17 | End: 2024-06-17

## 2024-06-17 RX ORDER — SODIUM CHLORIDE 0.9 % (FLUSH) 0.9 %
500 SYRINGE (ML) INJECTION
Refills: 0 | Status: DISCONTINUED | OUTPATIENT
Start: 2024-06-17 | End: 2024-06-18

## 2024-06-17 RX ORDER — DEXTROSE MONOHYDRATE AND SODIUM CHLORIDE 5; .3 G/100ML; G/100ML
1000 INJECTION, SOLUTION INTRAVENOUS ONCE
Refills: 0 | Status: COMPLETED | OUTPATIENT
Start: 2024-06-17 | End: 2024-06-17

## 2024-06-17 RX ORDER — PANTOPRAZOLE SODIUM 40 MG/10ML
40 INJECTION, POWDER, FOR SOLUTION INTRAVENOUS EVERY 12 HOURS
Refills: 0 | Status: DISCONTINUED | OUTPATIENT
Start: 2024-06-17 | End: 2024-07-03

## 2024-06-17 RX ORDER — HEPARIN SODIUM 50 [USP'U]/ML
5000 INJECTION, SOLUTION INTRAVENOUS ONCE
Refills: 0 | Status: COMPLETED | OUTPATIENT
Start: 2024-06-17 | End: 2024-06-17

## 2024-06-17 RX ADMIN — DEXTROSE MONOHYDRATE AND SODIUM CHLORIDE 1000 MILLILITER(S): 5; .3 INJECTION, SOLUTION INTRAVENOUS at 19:34

## 2024-06-17 RX ADMIN — PANTOPRAZOLE SODIUM 40 MILLIGRAM(S): 40 INJECTION, POWDER, FOR SOLUTION INTRAVENOUS at 18:38

## 2024-06-17 RX ADMIN — Medication 250 MILLILITER(S): at 20:49

## 2024-06-17 RX ADMIN — HEPARIN SODIUM 5000 UNIT(S): 50 INJECTION, SOLUTION INTRAVENOUS at 17:54

## 2024-06-17 RX ADMIN — Medication 1 TABLET(S): at 11:23

## 2024-06-17 RX ADMIN — INSULIN LISPRO 1: 100 INJECTION, SOLUTION SUBCUTANEOUS at 13:33

## 2024-06-17 RX ADMIN — LIDOCAINE HYDROCHLORIDE 10 MILLILITER(S): 20 INJECTION, SOLUTION EPIDURAL; INFILTRATION; INTRACAUDAL; PERINEURAL at 17:53

## 2024-06-17 RX ADMIN — INSULIN LISPRO 1: 100 INJECTION, SOLUTION SUBCUTANEOUS at 18:37

## 2024-06-17 RX ADMIN — ALLOPURINOL 100 MILLIGRAM(S): 300 TABLET ORAL at 11:23

## 2024-06-17 RX ADMIN — PANTOPRAZOLE SODIUM 40 MILLIGRAM(S): 40 INJECTION, POWDER, FOR SOLUTION INTRAVENOUS at 06:48

## 2024-06-18 DIAGNOSIS — E87.20 ACIDOSIS, UNSPECIFIED: ICD-10-CM

## 2024-06-18 DIAGNOSIS — I95.9 HYPOTENSION, UNSPECIFIED: ICD-10-CM

## 2024-06-18 LAB
ALBUMIN SERPL ELPH-MCNC: 2.4 G/DL — LOW (ref 3.3–5)
ALP SERPL-CCNC: 978 U/L — HIGH (ref 40–120)
ALT FLD-CCNC: 174 U/L — HIGH (ref 4–41)
ANION GAP SERPL CALC-SCNC: 15 MMOL/L — HIGH (ref 7–14)
APTT BLD: 36.9 SEC — HIGH (ref 24.5–35.6)
AST SERPL-CCNC: 176 U/L — HIGH (ref 4–40)
BASE EXCESS BLDV CALC-SCNC: -11.6 MMOL/L — LOW (ref -2–3)
BASOPHILS # BLD AUTO: 0.02 K/UL — SIGNIFICANT CHANGE UP (ref 0–0.2)
BASOPHILS NFR BLD AUTO: 0.2 % — SIGNIFICANT CHANGE UP (ref 0–2)
BILIRUB SERPL-MCNC: 12.4 MG/DL — HIGH (ref 0.2–1.2)
BLD GP AB SCN SERPL QL: NEGATIVE — SIGNIFICANT CHANGE UP
BLOOD GAS VENOUS COMPREHENSIVE RESULT: SIGNIFICANT CHANGE UP
BUN SERPL-MCNC: 57 MG/DL — HIGH (ref 7–23)
CALCIUM SERPL-MCNC: 9.1 MG/DL — SIGNIFICANT CHANGE UP (ref 8.4–10.5)
CHLORIDE BLDV-SCNC: 113 MMOL/L — HIGH (ref 96–108)
CHLORIDE SERPL-SCNC: 111 MMOL/L — HIGH (ref 98–107)
CO2 BLDV-SCNC: 15.4 MMOL/L — LOW (ref 22–26)
CO2 SERPL-SCNC: 10 MMOL/L — CRITICAL LOW (ref 22–31)
CREAT SERPL-MCNC: 1.71 MG/DL — HIGH (ref 0.5–1.3)
EGFR: 44 ML/MIN/1.73M2 — LOW
EOSINOPHIL # BLD AUTO: 0.02 K/UL — SIGNIFICANT CHANGE UP (ref 0–0.5)
EOSINOPHIL NFR BLD AUTO: 0.2 % — SIGNIFICANT CHANGE UP (ref 0–6)
FIBRINOGEN PPP-MCNC: 428 MG/DL — SIGNIFICANT CHANGE UP (ref 200–465)
GAS PNL BLDV: 137 MMOL/L — SIGNIFICANT CHANGE UP (ref 136–145)
GLUCOSE BLDC GLUCOMTR-MCNC: 100 MG/DL — HIGH (ref 70–99)
GLUCOSE BLDC GLUCOMTR-MCNC: 102 MG/DL — HIGH (ref 70–99)
GLUCOSE BLDC GLUCOMTR-MCNC: 104 MG/DL — HIGH (ref 70–99)
GLUCOSE BLDC GLUCOMTR-MCNC: 96 MG/DL — SIGNIFICANT CHANGE UP (ref 70–99)
GLUCOSE BLDC GLUCOMTR-MCNC: 98 MG/DL — SIGNIFICANT CHANGE UP (ref 70–99)
GLUCOSE BLDV-MCNC: 84 MG/DL — SIGNIFICANT CHANGE UP (ref 70–99)
GLUCOSE SERPL-MCNC: 93 MG/DL — SIGNIFICANT CHANGE UP (ref 70–99)
HCO3 BLDV-SCNC: 14 MMOL/L — LOW (ref 22–29)
HCT VFR BLD CALC: 19.8 % — CRITICAL LOW (ref 39–50)
HCT VFR BLD CALC: 20.2 % — CRITICAL LOW (ref 39–50)
HCT VFR BLD CALC: 21.9 % — LOW (ref 39–50)
HCT VFR BLD CALC: 22.8 % — LOW (ref 39–50)
HCT VFR BLDA CALC: 23 % — LOW (ref 39–51)
HGB BLD CALC-MCNC: 7.5 G/DL — LOW (ref 12.6–17.4)
HGB BLD-MCNC: 6.8 G/DL — CRITICAL LOW (ref 13–17)
HGB BLD-MCNC: 7.4 G/DL — LOW (ref 13–17)
HGB BLD-MCNC: 7.8 G/DL — LOW (ref 13–17)
HGB BLD-MCNC: 8 G/DL — LOW (ref 13–17)
IANC: 5.52 K/UL — SIGNIFICANT CHANGE UP (ref 1.8–7.4)
IMM GRANULOCYTES NFR BLD AUTO: 8.1 % — HIGH (ref 0–0.9)
INR BLD: 1.55 RATIO — HIGH (ref 0.85–1.18)
LACTATE BLDV-MCNC: 0.7 MMOL/L — SIGNIFICANT CHANGE UP (ref 0.5–2)
LYMPHOCYTES # BLD AUTO: 1.94 K/UL — SIGNIFICANT CHANGE UP (ref 1–3.3)
LYMPHOCYTES # BLD AUTO: 20.1 % — SIGNIFICANT CHANGE UP (ref 13–44)
MAGNESIUM SERPL-MCNC: 1.6 MG/DL — SIGNIFICANT CHANGE UP (ref 1.6–2.6)
MCHC RBC-ENTMCNC: 31.5 PG — SIGNIFICANT CHANGE UP (ref 27–34)
MCHC RBC-ENTMCNC: 31.7 PG — SIGNIFICANT CHANGE UP (ref 27–34)
MCHC RBC-ENTMCNC: 31.8 PG — SIGNIFICANT CHANGE UP (ref 27–34)
MCHC RBC-ENTMCNC: 32.9 PG — SIGNIFICANT CHANGE UP (ref 27–34)
MCHC RBC-ENTMCNC: 34.3 GM/DL — SIGNIFICANT CHANGE UP (ref 32–36)
MCHC RBC-ENTMCNC: 35.1 GM/DL — SIGNIFICANT CHANGE UP (ref 32–36)
MCHC RBC-ENTMCNC: 35.6 GM/DL — SIGNIFICANT CHANGE UP (ref 32–36)
MCHC RBC-ENTMCNC: 36.6 GM/DL — HIGH (ref 32–36)
MCV RBC AUTO: 88.3 FL — SIGNIFICANT CHANGE UP (ref 80–100)
MCV RBC AUTO: 89.8 FL — SIGNIFICANT CHANGE UP (ref 80–100)
MCV RBC AUTO: 90.5 FL — SIGNIFICANT CHANGE UP (ref 80–100)
MCV RBC AUTO: 92.5 FL — SIGNIFICANT CHANGE UP (ref 80–100)
MONOCYTES # BLD AUTO: 1.36 K/UL — HIGH (ref 0–0.9)
MONOCYTES NFR BLD AUTO: 14.1 % — HIGH (ref 2–14)
NEUTROPHILS # BLD AUTO: 5.52 K/UL — SIGNIFICANT CHANGE UP (ref 1.8–7.4)
NEUTROPHILS NFR BLD AUTO: 57.3 % — SIGNIFICANT CHANGE UP (ref 43–77)
NRBC # BLD: 0 /100 WBCS — SIGNIFICANT CHANGE UP (ref 0–0)
NRBC # FLD: 0 K/UL — SIGNIFICANT CHANGE UP (ref 0–0)
PCO2 BLDV: 32 MMHG — LOW (ref 42–55)
PH BLDV: 7.26 — LOW (ref 7.32–7.43)
PHOSPHATE SERPL-MCNC: 4.8 MG/DL — HIGH (ref 2.5–4.5)
PLATELET # BLD AUTO: 19 K/UL — CRITICAL LOW (ref 150–400)
PLATELET # BLD AUTO: 57 K/UL — LOW (ref 150–400)
PLATELET # BLD AUTO: 58 K/UL — LOW (ref 150–400)
PLATELET # BLD AUTO: 61 K/UL — LOW (ref 150–400)
PO2 BLDV: 32 MMHG — SIGNIFICANT CHANGE UP (ref 25–45)
POTASSIUM BLDV-SCNC: 4.4 MMOL/L — SIGNIFICANT CHANGE UP (ref 3.5–5.1)
POTASSIUM SERPL-MCNC: 4.6 MMOL/L — SIGNIFICANT CHANGE UP (ref 3.5–5.3)
POTASSIUM SERPL-SCNC: 4.6 MMOL/L — SIGNIFICANT CHANGE UP (ref 3.5–5.3)
PROT SERPL-MCNC: 5.4 G/DL — LOW (ref 6–8.3)
PROTHROM AB SERPL-ACNC: 17.1 SEC — HIGH (ref 9.5–13)
RBC # BLD: 2.14 M/UL — LOW (ref 4.2–5.8)
RBC # BLD: 2.25 M/UL — LOW (ref 4.2–5.8)
RBC # BLD: 2.48 M/UL — LOW (ref 4.2–5.8)
RBC # BLD: 2.52 M/UL — LOW (ref 4.2–5.8)
RBC # FLD: 20.9 % — HIGH (ref 10.3–14.5)
RBC # FLD: 21.3 % — HIGH (ref 10.3–14.5)
RBC # FLD: 21.6 % — HIGH (ref 10.3–14.5)
RBC # FLD: 22.5 % — HIGH (ref 10.3–14.5)
RH IG SCN BLD-IMP: POSITIVE — SIGNIFICANT CHANGE UP
SAO2 % BLDV: 49 % — LOW (ref 67–88)
SODIUM SERPL-SCNC: 136 MMOL/L — SIGNIFICANT CHANGE UP (ref 135–145)
WBC # BLD: 8.57 K/UL — SIGNIFICANT CHANGE UP (ref 3.8–10.5)
WBC # BLD: 9.19 K/UL — SIGNIFICANT CHANGE UP (ref 3.8–10.5)
WBC # BLD: 9.64 K/UL — SIGNIFICANT CHANGE UP (ref 3.8–10.5)
WBC # BLD: 9.7 K/UL — SIGNIFICANT CHANGE UP (ref 3.8–10.5)
WBC # FLD AUTO: 8.57 K/UL — SIGNIFICANT CHANGE UP (ref 3.8–10.5)
WBC # FLD AUTO: 9.19 K/UL — SIGNIFICANT CHANGE UP (ref 3.8–10.5)
WBC # FLD AUTO: 9.64 K/UL — SIGNIFICANT CHANGE UP (ref 3.8–10.5)
WBC # FLD AUTO: 9.7 K/UL — SIGNIFICANT CHANGE UP (ref 3.8–10.5)

## 2024-06-18 PROCEDURE — 74176 CT ABD & PELVIS W/O CONTRAST: CPT | Mod: 26

## 2024-06-18 PROCEDURE — 99232 SBSQ HOSP IP/OBS MODERATE 35: CPT | Mod: GC

## 2024-06-18 PROCEDURE — 99233 SBSQ HOSP IP/OBS HIGH 50: CPT | Mod: GC

## 2024-06-18 RX ADMIN — PANTOPRAZOLE SODIUM 40 MILLIGRAM(S): 40 INJECTION, POWDER, FOR SOLUTION INTRAVENOUS at 18:55

## 2024-06-18 RX ADMIN — PANTOPRAZOLE SODIUM 40 MILLIGRAM(S): 40 INJECTION, POWDER, FOR SOLUTION INTRAVENOUS at 06:18

## 2024-06-18 RX ADMIN — Medication 1 TABLET(S): at 13:31

## 2024-06-18 RX ADMIN — ALLOPURINOL 100 MILLIGRAM(S): 300 TABLET ORAL at 13:31

## 2024-06-19 LAB
ALBUMIN SERPL ELPH-MCNC: 2.8 G/DL — LOW (ref 3.3–5)
ALP SERPL-CCNC: 834 U/L — HIGH (ref 40–120)
ALT FLD-CCNC: 131 U/L — HIGH (ref 4–41)
ANION GAP SERPL CALC-SCNC: 13 MMOL/L — SIGNIFICANT CHANGE UP (ref 7–14)
APTT BLD: 41.7 SEC — HIGH (ref 24.5–35.6)
AST SERPL-CCNC: 97 U/L — HIGH (ref 4–40)
BASOPHILS # BLD AUTO: 0.01 K/UL — SIGNIFICANT CHANGE UP (ref 0–0.2)
BASOPHILS NFR BLD AUTO: 0.1 % — SIGNIFICANT CHANGE UP (ref 0–2)
BILIRUB SERPL-MCNC: 13.9 MG/DL — HIGH (ref 0.2–1.2)
BLD GP AB SCN SERPL QL: NEGATIVE — SIGNIFICANT CHANGE UP
BUN SERPL-MCNC: 53 MG/DL — HIGH (ref 7–23)
CALCIUM SERPL-MCNC: 9.3 MG/DL — SIGNIFICANT CHANGE UP (ref 8.4–10.5)
CHLORIDE SERPL-SCNC: 110 MMOL/L — HIGH (ref 98–107)
CO2 SERPL-SCNC: 14 MMOL/L — LOW (ref 22–31)
CREAT SERPL-MCNC: 1.47 MG/DL — HIGH (ref 0.5–1.3)
EGFR: 53 ML/MIN/1.73M2 — LOW
EOSINOPHIL # BLD AUTO: 0.07 K/UL — SIGNIFICANT CHANGE UP (ref 0–0.5)
EOSINOPHIL NFR BLD AUTO: 0.7 % — SIGNIFICANT CHANGE UP (ref 0–6)
GLUCOSE BLDC GLUCOMTR-MCNC: 120 MG/DL — HIGH (ref 70–99)
GLUCOSE BLDC GLUCOMTR-MCNC: 135 MG/DL — HIGH (ref 70–99)
GLUCOSE BLDC GLUCOMTR-MCNC: 187 MG/DL — HIGH (ref 70–99)
GLUCOSE BLDC GLUCOMTR-MCNC: 92 MG/DL — SIGNIFICANT CHANGE UP (ref 70–99)
GLUCOSE SERPL-MCNC: 82 MG/DL — SIGNIFICANT CHANGE UP (ref 70–99)
HCT VFR BLD CALC: 19 % — CRITICAL LOW (ref 39–50)
HCT VFR BLD CALC: 20.3 % — CRITICAL LOW (ref 39–50)
HCT VFR BLD CALC: 20.5 % — CRITICAL LOW (ref 39–50)
HCT VFR BLD CALC: 21.7 % — LOW (ref 39–50)
HCT VFR BLD CALC: 22.3 % — LOW (ref 39–50)
HGB BLD-MCNC: 6.7 G/DL — CRITICAL LOW (ref 13–17)
HGB BLD-MCNC: 7.3 G/DL — LOW (ref 13–17)
HGB BLD-MCNC: 7.4 G/DL — LOW (ref 13–17)
HGB BLD-MCNC: 7.7 G/DL — LOW (ref 13–17)
HGB BLD-MCNC: 7.9 G/DL — LOW (ref 13–17)
HLX FLT3 FINAL REPORT: SIGNIFICANT CHANGE UP
HLX FLT3 FINAL REPORT: SIGNIFICANT CHANGE UP
IANC: 6.27 K/UL — SIGNIFICANT CHANGE UP (ref 1.8–7.4)
IMM GRANULOCYTES NFR BLD AUTO: 9.2 % — HIGH (ref 0–0.9)
INR BLD: 1.64 RATIO — HIGH (ref 0.85–1.18)
JAK2 P.V617F BLD/T QL: SIGNIFICANT CHANGE UP
LYMPHOCYTES # BLD AUTO: 2.15 K/UL — SIGNIFICANT CHANGE UP (ref 1–3.3)
LYMPHOCYTES # BLD AUTO: 20.2 % — SIGNIFICANT CHANGE UP (ref 13–44)
MAGNESIUM SERPL-MCNC: 1.5 MG/DL — LOW (ref 1.6–2.6)
MCHC RBC-ENTMCNC: 31.2 PG — SIGNIFICANT CHANGE UP (ref 27–34)
MCHC RBC-ENTMCNC: 31.4 PG — SIGNIFICANT CHANGE UP (ref 27–34)
MCHC RBC-ENTMCNC: 31.6 PG — SIGNIFICANT CHANGE UP (ref 27–34)
MCHC RBC-ENTMCNC: 31.8 PG — SIGNIFICANT CHANGE UP (ref 27–34)
MCHC RBC-ENTMCNC: 32 PG — SIGNIFICANT CHANGE UP (ref 27–34)
MCHC RBC-ENTMCNC: 35.3 GM/DL — SIGNIFICANT CHANGE UP (ref 32–36)
MCHC RBC-ENTMCNC: 35.4 GM/DL — SIGNIFICANT CHANGE UP (ref 32–36)
MCHC RBC-ENTMCNC: 35.5 GM/DL — SIGNIFICANT CHANGE UP (ref 32–36)
MCHC RBC-ENTMCNC: 36 GM/DL — SIGNIFICANT CHANGE UP (ref 32–36)
MCHC RBC-ENTMCNC: 36.1 GM/DL — HIGH (ref 32–36)
MCV RBC AUTO: 88 FL — SIGNIFICANT CHANGE UP (ref 80–100)
MCV RBC AUTO: 88.1 FL — SIGNIFICANT CHANGE UP (ref 80–100)
MCV RBC AUTO: 88.6 FL — SIGNIFICANT CHANGE UP (ref 80–100)
MCV RBC AUTO: 89 FL — SIGNIFICANT CHANGE UP (ref 80–100)
MCV RBC AUTO: 89.6 FL — SIGNIFICANT CHANGE UP (ref 80–100)
MONOCYTES # BLD AUTO: 1.18 K/UL — HIGH (ref 0–0.9)
MONOCYTES NFR BLD AUTO: 11.1 % — SIGNIFICANT CHANGE UP (ref 2–14)
NEUTROPHILS # BLD AUTO: 6.27 K/UL — SIGNIFICANT CHANGE UP (ref 1.8–7.4)
NEUTROPHILS NFR BLD AUTO: 58.7 % — SIGNIFICANT CHANGE UP (ref 43–77)
NRBC # BLD: 0 /100 WBCS — SIGNIFICANT CHANGE UP (ref 0–0)
NRBC # FLD: 0 K/UL — SIGNIFICANT CHANGE UP (ref 0–0)
PHOSPHATE SERPL-MCNC: 4.1 MG/DL — SIGNIFICANT CHANGE UP (ref 2.5–4.5)
PLATELET # BLD AUTO: 59 K/UL — LOW (ref 150–400)
PLATELET # BLD AUTO: 63 K/UL — LOW (ref 150–400)
PLATELET # BLD AUTO: 64 K/UL — LOW (ref 150–400)
PLATELET # BLD AUTO: 66 K/UL — LOW (ref 150–400)
PLATELET # BLD AUTO: 75 K/UL — LOW (ref 150–400)
POTASSIUM SERPL-MCNC: 4.2 MMOL/L — SIGNIFICANT CHANGE UP (ref 3.5–5.3)
POTASSIUM SERPL-SCNC: 4.2 MMOL/L — SIGNIFICANT CHANGE UP (ref 3.5–5.3)
PROT SERPL-MCNC: 5.5 G/DL — LOW (ref 6–8.3)
PROTHROM AB SERPL-ACNC: 18.3 SEC — HIGH (ref 9.5–13)
RBC # BLD: 2.12 M/UL — LOW (ref 4.2–5.8)
RBC # BLD: 2.28 M/UL — LOW (ref 4.2–5.8)
RBC # BLD: 2.33 M/UL — LOW (ref 4.2–5.8)
RBC # BLD: 2.45 M/UL — LOW (ref 4.2–5.8)
RBC # BLD: 2.45 M/UL — LOW (ref 4.2–5.8)
RBC # BLD: 2.53 M/UL — LOW (ref 4.2–5.8)
RBC # FLD: 20.5 % — HIGH (ref 10.3–14.5)
RBC # FLD: 21.1 % — HIGH (ref 10.3–14.5)
RBC # FLD: 21.4 % — HIGH (ref 10.3–14.5)
RBC # FLD: 21.7 % — HIGH (ref 10.3–14.5)
RBC # FLD: 22.5 % — HIGH (ref 10.3–14.5)
RETICS #: 92.4 K/UL — SIGNIFICANT CHANGE UP (ref 25–125)
RETICS/RBC NFR: 3.8 % — HIGH (ref 0.5–2.5)
RH IG SCN BLD-IMP: POSITIVE — SIGNIFICANT CHANGE UP
SODIUM SERPL-SCNC: 137 MMOL/L — SIGNIFICANT CHANGE UP (ref 135–145)
TM INTERPRETATION: SIGNIFICANT CHANGE UP
WBC # BLD: 10.66 K/UL — HIGH (ref 3.8–10.5)
WBC # BLD: 14.1 K/UL — HIGH (ref 3.8–10.5)
WBC # BLD: 8.72 K/UL — SIGNIFICANT CHANGE UP (ref 3.8–10.5)
WBC # BLD: 8.89 K/UL — SIGNIFICANT CHANGE UP (ref 3.8–10.5)
WBC # BLD: 9.49 K/UL — SIGNIFICANT CHANGE UP (ref 3.8–10.5)
WBC # FLD AUTO: 10.66 K/UL — HIGH (ref 3.8–10.5)
WBC # FLD AUTO: 14.1 K/UL — HIGH (ref 3.8–10.5)
WBC # FLD AUTO: 8.72 K/UL — SIGNIFICANT CHANGE UP (ref 3.8–10.5)
WBC # FLD AUTO: 8.89 K/UL — SIGNIFICANT CHANGE UP (ref 3.8–10.5)
WBC # FLD AUTO: 9.49 K/UL — SIGNIFICANT CHANGE UP (ref 3.8–10.5)

## 2024-06-19 PROCEDURE — 99232 SBSQ HOSP IP/OBS MODERATE 35: CPT | Mod: GC

## 2024-06-19 PROCEDURE — 99233 SBSQ HOSP IP/OBS HIGH 50: CPT | Mod: GC

## 2024-06-19 PROCEDURE — 74183 MRI ABD W/O CNTR FLWD CNTR: CPT | Mod: 26

## 2024-06-19 RX ORDER — SILVER NITRATE APPLICATORS 25; 75 MG/100MG; MG/100MG
1 STICK TOPICAL ONCE
Refills: 0 | Status: DISCONTINUED | OUTPATIENT
Start: 2024-06-19 | End: 2024-06-19

## 2024-06-19 RX ORDER — MAGNESIUM SULFATE 100 %
2 POWDER (GRAM) MISCELLANEOUS ONCE
Refills: 0 | Status: COMPLETED | OUTPATIENT
Start: 2024-06-19 | End: 2024-06-19

## 2024-06-19 RX ORDER — DEXTROSE MONOHYDRATE AND SODIUM CHLORIDE 5; .3 G/100ML; G/100ML
1000 INJECTION, SOLUTION INTRAVENOUS ONCE
Refills: 0 | Status: COMPLETED | OUTPATIENT
Start: 2024-06-19 | End: 2024-06-19

## 2024-06-19 RX ORDER — DEXTROSE MONOHYDRATE AND SODIUM CHLORIDE 5; .3 G/100ML; G/100ML
500 INJECTION, SOLUTION INTRAVENOUS
Refills: 0 | Status: DISCONTINUED | OUTPATIENT
Start: 2024-06-19 | End: 2024-06-19

## 2024-06-19 RX ORDER — SILVER NITRATE APPLICATORS 25; 75 MG/100MG; MG/100MG
1 STICK TOPICAL ONCE
Refills: 0 | Status: COMPLETED | OUTPATIENT
Start: 2024-06-19 | End: 2024-06-19

## 2024-06-19 RX ADMIN — DEXTROSE MONOHYDRATE AND SODIUM CHLORIDE 250 MILLILITER(S): 5; .3 INJECTION, SOLUTION INTRAVENOUS at 05:34

## 2024-06-19 RX ADMIN — DEXTROSE MONOHYDRATE AND SODIUM CHLORIDE 500 MILLILITER(S): 5; .3 INJECTION, SOLUTION INTRAVENOUS at 01:01

## 2024-06-19 RX ADMIN — Medication 1 TABLET(S): at 11:52

## 2024-06-19 RX ADMIN — PANTOPRAZOLE SODIUM 40 MILLIGRAM(S): 40 INJECTION, POWDER, FOR SOLUTION INTRAVENOUS at 05:34

## 2024-06-19 RX ADMIN — DEXTROSE MONOHYDRATE AND SODIUM CHLORIDE 1000 MILLILITER(S): 5; .3 INJECTION, SOLUTION INTRAVENOUS at 18:45

## 2024-06-19 RX ADMIN — SILVER NITRATE APPLICATORS 1 APPLICATION(S): 25; 75 STICK TOPICAL at 01:50

## 2024-06-19 RX ADMIN — ALLOPURINOL 100 MILLIGRAM(S): 300 TABLET ORAL at 11:52

## 2024-06-19 RX ADMIN — PANTOPRAZOLE SODIUM 40 MILLIGRAM(S): 40 INJECTION, POWDER, FOR SOLUTION INTRAVENOUS at 17:32

## 2024-06-19 RX ADMIN — Medication 25 GRAM(S): at 08:31

## 2024-06-20 LAB
ACANTHOCYTES BLD QL SMEAR: SLIGHT — SIGNIFICANT CHANGE UP
ALBUMIN SERPL ELPH-MCNC: 2.9 G/DL — LOW (ref 3.3–5)
ALP SERPL-CCNC: 754 U/L — HIGH (ref 40–120)
ALT FLD-CCNC: 116 U/L — HIGH (ref 4–41)
ANION GAP SERPL CALC-SCNC: 9 MMOL/L — SIGNIFICANT CHANGE UP (ref 7–14)
ANISOCYTOSIS BLD QL: SIGNIFICANT CHANGE UP
APTT BLD: 44.1 SEC — HIGH (ref 24.5–35.6)
AST SERPL-CCNC: 114 U/L — HIGH (ref 4–40)
AUTO DIFF PNL BLD: ABNORMAL
BASOPHILS # BLD AUTO: 0 K/UL — SIGNIFICANT CHANGE UP (ref 0–0.2)
BASOPHILS NFR BLD AUTO: 0 % — SIGNIFICANT CHANGE UP (ref 0–2)
BCR/ABL BY RT - PCR QUANTITATIVE: SIGNIFICANT CHANGE UP
BCR/ABL BY RT - PCR QUANTITATIVE: SIGNIFICANT CHANGE UP
BILIRUB SERPL-MCNC: 12.8 MG/DL — HIGH (ref 0.2–1.2)
BUN SERPL-MCNC: 41 MG/DL — HIGH (ref 7–23)
BURR CELLS BLD QL SMEAR: PRESENT — SIGNIFICANT CHANGE UP
C-ANCA SER-ACNC: NEGATIVE — SIGNIFICANT CHANGE UP
CALCIUM SERPL-MCNC: 9.1 MG/DL — SIGNIFICANT CHANGE UP (ref 8.4–10.5)
CALRETICULIN INTERPRETATION: SIGNIFICANT CHANGE UP
CALRETICULIN INTERPRETATION: SIGNIFICANT CHANGE UP
CHLORIDE SERPL-SCNC: 111 MMOL/L — HIGH (ref 98–107)
CO2 SERPL-SCNC: 17 MMOL/L — LOW (ref 22–31)
CREAT SERPL-MCNC: 1.22 MG/DL — SIGNIFICANT CHANGE UP (ref 0.5–1.3)
DNA PLOIDY SPEC FC-IMP: SIGNIFICANT CHANGE UP
EGFR: 66 ML/MIN/1.73M2 — SIGNIFICANT CHANGE UP
EOSINOPHIL # BLD AUTO: 0 K/UL — SIGNIFICANT CHANGE UP (ref 0–0.5)
EOSINOPHIL NFR BLD AUTO: 0 % — SIGNIFICANT CHANGE UP (ref 0–6)
GIANT PLATELETS BLD QL SMEAR: PRESENT — SIGNIFICANT CHANGE UP
GLUCOSE BLDC GLUCOMTR-MCNC: 102 MG/DL — HIGH (ref 70–99)
GLUCOSE BLDC GLUCOMTR-MCNC: 117 MG/DL — HIGH (ref 70–99)
GLUCOSE BLDC GLUCOMTR-MCNC: 130 MG/DL — HIGH (ref 70–99)
GLUCOSE BLDC GLUCOMTR-MCNC: 143 MG/DL — HIGH (ref 70–99)
GLUCOSE SERPL-MCNC: 103 MG/DL — HIGH (ref 70–99)
HAPTOGLOB SERPL-MCNC: 83 MG/DL — SIGNIFICANT CHANGE UP (ref 34–200)
HCT VFR BLD CALC: 21.8 % — LOW (ref 39–50)
HCT VFR BLD CALC: 22.1 % — LOW (ref 39–50)
HGB BLD-MCNC: 7.7 G/DL — LOW (ref 13–17)
HGB BLD-MCNC: 7.8 G/DL — LOW (ref 13–17)
IANC: 4.06 K/UL — SIGNIFICANT CHANGE UP (ref 1.8–7.4)
INR BLD: 1.79 RATIO — HIGH (ref 0.85–1.18)
LDH SERPL L TO P-CCNC: 115 U/L — LOW (ref 135–225)
LYMPHOCYTES # BLD AUTO: 1.16 K/UL — SIGNIFICANT CHANGE UP (ref 1–3.3)
LYMPHOCYTES # BLD AUTO: 15.5 % — SIGNIFICANT CHANGE UP (ref 13–44)
MACROCYTES BLD QL: SLIGHT — SIGNIFICANT CHANGE UP
MAGNESIUM SERPL-MCNC: 1.3 MG/DL — LOW (ref 1.6–2.6)
MANUAL SMEAR VERIFICATION: SIGNIFICANT CHANGE UP
MCHC RBC-ENTMCNC: 30.6 PG — SIGNIFICANT CHANGE UP (ref 27–34)
MCHC RBC-ENTMCNC: 31.7 PG — SIGNIFICANT CHANGE UP (ref 27–34)
MCHC RBC-ENTMCNC: 34.8 GM/DL — SIGNIFICANT CHANGE UP (ref 32–36)
MCHC RBC-ENTMCNC: 35.8 GM/DL — SIGNIFICANT CHANGE UP (ref 32–36)
MCV RBC AUTO: 87.7 FL — SIGNIFICANT CHANGE UP (ref 80–100)
MCV RBC AUTO: 88.6 FL — SIGNIFICANT CHANGE UP (ref 80–100)
METAMYELOCYTES # FLD: 3.6 % — HIGH (ref 0–1)
MONOCYTES # BLD AUTO: 0.41 K/UL — SIGNIFICANT CHANGE UP (ref 0–0.9)
MONOCYTES NFR BLD AUTO: 5.5 % — SIGNIFICANT CHANGE UP (ref 2–14)
MPO AB + PR3 PNL SER: SIGNIFICANT CHANGE UP
MYELOCYTES NFR BLD: 2.7 % — HIGH (ref 0–0)
NEUTROPHILS # BLD AUTO: 5.03 K/UL — SIGNIFICANT CHANGE UP (ref 1.8–7.4)
NEUTROPHILS NFR BLD AUTO: 65.4 % — SIGNIFICANT CHANGE UP (ref 43–77)
NEUTS BAND # BLD: 1.8 % — SIGNIFICANT CHANGE UP (ref 0–6)
NRBC # BLD: 0 /100 WBCS — SIGNIFICANT CHANGE UP (ref 0–0)
NRBC # FLD: 0 K/UL — SIGNIFICANT CHANGE UP (ref 0–0)
P-ANCA SER-ACNC: NEGATIVE — SIGNIFICANT CHANGE UP
PHOSPHATE SERPL-MCNC: 3.1 MG/DL — SIGNIFICANT CHANGE UP (ref 2.5–4.5)
PLAT MORPH BLD: ABNORMAL
PLATELET # BLD AUTO: 62 K/UL — LOW (ref 150–400)
PLATELET # BLD AUTO: 64 K/UL — LOW (ref 150–400)
PLATELET COUNT - ESTIMATE: ABNORMAL
POIKILOCYTOSIS BLD QL AUTO: SIGNIFICANT CHANGE UP
POLYCHROMASIA BLD QL SMEAR: SLIGHT — SIGNIFICANT CHANGE UP
POTASSIUM SERPL-MCNC: 4.2 MMOL/L — SIGNIFICANT CHANGE UP (ref 3.5–5.3)
POTASSIUM SERPL-SCNC: 4.2 MMOL/L — SIGNIFICANT CHANGE UP (ref 3.5–5.3)
PROT SERPL-MCNC: 5.2 G/DL — LOW (ref 6–8.3)
PROTHROM AB SERPL-ACNC: 19.7 SEC — HIGH (ref 9.5–13)
RBC # BLD: 2.46 M/UL — LOW (ref 4.2–5.8)
RBC # BLD: 2.52 M/UL — LOW (ref 4.2–5.8)
RBC # FLD: 21.3 % — HIGH (ref 10.3–14.5)
RBC # FLD: 21.8 % — HIGH (ref 10.3–14.5)
RBC BLD AUTO: ABNORMAL
SCHISTOCYTES BLD QL AUTO: SLIGHT — SIGNIFICANT CHANGE UP
SMUDGE CELLS # BLD: PRESENT — SIGNIFICANT CHANGE UP
SODIUM SERPL-SCNC: 137 MMOL/L — SIGNIFICANT CHANGE UP (ref 135–145)
TARGETS BLD QL SMEAR: SLIGHT — SIGNIFICANT CHANGE UP
VARIANT LYMPHS # BLD: 5.5 % — SIGNIFICANT CHANGE UP (ref 0–6)
WBC # BLD: 7.49 K/UL — SIGNIFICANT CHANGE UP (ref 3.8–10.5)
WBC # BLD: 8.91 K/UL — SIGNIFICANT CHANGE UP (ref 3.8–10.5)
WBC # FLD AUTO: 7.49 K/UL — SIGNIFICANT CHANGE UP (ref 3.8–10.5)
WBC # FLD AUTO: 8.91 K/UL — SIGNIFICANT CHANGE UP (ref 3.8–10.5)

## 2024-06-20 PROCEDURE — 99233 SBSQ HOSP IP/OBS HIGH 50: CPT | Mod: GC

## 2024-06-20 PROCEDURE — 99223 1ST HOSP IP/OBS HIGH 75: CPT | Mod: GC

## 2024-06-20 RX ORDER — MAGNESIUM SULFATE 100 %
2 POWDER (GRAM) MISCELLANEOUS ONCE
Refills: 0 | Status: COMPLETED | OUTPATIENT
Start: 2024-06-20 | End: 2024-06-20

## 2024-06-20 RX ORDER — INSULIN LISPRO 100 [IU]/ML
INJECTION, SOLUTION SUBCUTANEOUS AT BEDTIME
Refills: 0 | Status: DISCONTINUED | OUTPATIENT
Start: 2024-06-20 | End: 2024-06-24

## 2024-06-20 RX ORDER — INSULIN LISPRO 100 [IU]/ML
INJECTION, SOLUTION SUBCUTANEOUS
Refills: 0 | Status: DISCONTINUED | OUTPATIENT
Start: 2024-06-20 | End: 2024-06-24

## 2024-06-20 RX ADMIN — ALLOPURINOL 100 MILLIGRAM(S): 300 TABLET ORAL at 11:33

## 2024-06-20 RX ADMIN — Medication 1 TABLET(S): at 11:33

## 2024-06-20 RX ADMIN — PANTOPRAZOLE SODIUM 40 MILLIGRAM(S): 40 INJECTION, POWDER, FOR SOLUTION INTRAVENOUS at 17:32

## 2024-06-20 RX ADMIN — PANTOPRAZOLE SODIUM 40 MILLIGRAM(S): 40 INJECTION, POWDER, FOR SOLUTION INTRAVENOUS at 06:02

## 2024-06-20 RX ADMIN — Medication 25 GRAM(S): at 09:41

## 2024-06-21 LAB
ALBUMIN SERPL ELPH-MCNC: 2.8 G/DL — LOW (ref 3.3–5)
ALP SERPL-CCNC: 919 U/L — HIGH (ref 40–120)
ALT FLD-CCNC: 169 U/L — HIGH (ref 4–41)
ANION GAP SERPL CALC-SCNC: 13 MMOL/L — SIGNIFICANT CHANGE UP (ref 7–14)
AST SERPL-CCNC: 256 U/L — HIGH (ref 4–40)
BASOPHILS # BLD AUTO: 0.02 K/UL — SIGNIFICANT CHANGE UP (ref 0–0.2)
BASOPHILS NFR BLD AUTO: 0.3 % — SIGNIFICANT CHANGE UP (ref 0–2)
BILIRUB SERPL-MCNC: 14.9 MG/DL — HIGH (ref 0.2–1.2)
BLD GP AB SCN SERPL QL: NEGATIVE — SIGNIFICANT CHANGE UP
BUN SERPL-MCNC: 38 MG/DL — HIGH (ref 7–23)
CALCIUM SERPL-MCNC: 8.7 MG/DL — SIGNIFICANT CHANGE UP (ref 8.4–10.5)
CHLORIDE SERPL-SCNC: 109 MMOL/L — HIGH (ref 98–107)
CO2 SERPL-SCNC: 14 MMOL/L — LOW (ref 22–31)
CREAT SERPL-MCNC: 1.08 MG/DL — SIGNIFICANT CHANGE UP (ref 0.5–1.3)
EGFR: 76 ML/MIN/1.73M2 — SIGNIFICANT CHANGE UP
EOSINOPHIL # BLD AUTO: 0.02 K/UL — SIGNIFICANT CHANGE UP (ref 0–0.5)
EOSINOPHIL NFR BLD AUTO: 0.3 % — SIGNIFICANT CHANGE UP (ref 0–6)
GLUCOSE BLDC GLUCOMTR-MCNC: 112 MG/DL — HIGH (ref 70–99)
GLUCOSE BLDC GLUCOMTR-MCNC: 124 MG/DL — HIGH (ref 70–99)
GLUCOSE BLDC GLUCOMTR-MCNC: 158 MG/DL — HIGH (ref 70–99)
GLUCOSE BLDC GLUCOMTR-MCNC: 178 MG/DL — HIGH (ref 70–99)
GLUCOSE SERPL-MCNC: 99 MG/DL — SIGNIFICANT CHANGE UP (ref 70–99)
HCT VFR BLD CALC: 21.4 % — LOW (ref 39–50)
HGB BLD-MCNC: 7.4 G/DL — LOW (ref 13–17)
IANC: 4 K/UL — SIGNIFICANT CHANGE UP (ref 1.8–7.4)
IMM GRANULOCYTES NFR BLD AUTO: 6.1 % — HIGH (ref 0–0.9)
INR BLD: 1.41 RATIO — HIGH (ref 0.85–1.18)
LYMPHOCYTES # BLD AUTO: 1.8 K/UL — SIGNIFICANT CHANGE UP (ref 1–3.3)
LYMPHOCYTES # BLD AUTO: 24.9 % — SIGNIFICANT CHANGE UP (ref 13–44)
MAGNESIUM SERPL-MCNC: 1.3 MG/DL — LOW (ref 1.6–2.6)
MCHC RBC-ENTMCNC: 31.2 PG — SIGNIFICANT CHANGE UP (ref 27–34)
MCHC RBC-ENTMCNC: 34.6 GM/DL — SIGNIFICANT CHANGE UP (ref 32–36)
MCV RBC AUTO: 90.3 FL — SIGNIFICANT CHANGE UP (ref 80–100)
MONOCYTES # BLD AUTO: 0.95 K/UL — HIGH (ref 0–0.9)
MONOCYTES NFR BLD AUTO: 13.1 % — SIGNIFICANT CHANGE UP (ref 2–14)
NEUTROPHILS # BLD AUTO: 4 K/UL — SIGNIFICANT CHANGE UP (ref 1.8–7.4)
NEUTROPHILS NFR BLD AUTO: 55.3 % — SIGNIFICANT CHANGE UP (ref 43–77)
NRBC # BLD: 0 /100 WBCS — SIGNIFICANT CHANGE UP (ref 0–0)
NRBC # FLD: 0.02 K/UL — HIGH (ref 0–0)
PHOSPHATE SERPL-MCNC: 3.3 MG/DL — SIGNIFICANT CHANGE UP (ref 2.5–4.5)
PLATELET # BLD AUTO: 54 K/UL — LOW (ref 150–400)
POTASSIUM SERPL-MCNC: 4.4 MMOL/L — SIGNIFICANT CHANGE UP (ref 3.5–5.3)
POTASSIUM SERPL-SCNC: 4.4 MMOL/L — SIGNIFICANT CHANGE UP (ref 3.5–5.3)
PROT SERPL-MCNC: 5.4 G/DL — LOW (ref 6–8.3)
PROTHROM AB SERPL-ACNC: 15.8 SEC — HIGH (ref 9.5–13)
RBC # BLD: 2.37 M/UL — LOW (ref 4.2–5.8)
RBC # FLD: 22.5 % — HIGH (ref 10.3–14.5)
RH IG SCN BLD-IMP: POSITIVE — SIGNIFICANT CHANGE UP
SODIUM SERPL-SCNC: 136 MMOL/L — SIGNIFICANT CHANGE UP (ref 135–145)
WBC # BLD: 7.23 K/UL — SIGNIFICANT CHANGE UP (ref 3.8–10.5)
WBC # FLD AUTO: 7.23 K/UL — SIGNIFICANT CHANGE UP (ref 3.8–10.5)

## 2024-06-21 PROCEDURE — 99233 SBSQ HOSP IP/OBS HIGH 50: CPT | Mod: GC

## 2024-06-21 RX ORDER — MAGNESIUM SULFATE 100 %
2 POWDER (GRAM) MISCELLANEOUS ONCE
Refills: 0 | Status: COMPLETED | OUTPATIENT
Start: 2024-06-21 | End: 2024-06-21

## 2024-06-21 RX ADMIN — PANTOPRAZOLE SODIUM 40 MILLIGRAM(S): 40 INJECTION, POWDER, FOR SOLUTION INTRAVENOUS at 17:11

## 2024-06-21 RX ADMIN — INSULIN LISPRO 1: 100 INJECTION, SOLUTION SUBCUTANEOUS at 17:40

## 2024-06-21 RX ADMIN — PANTOPRAZOLE SODIUM 40 MILLIGRAM(S): 40 INJECTION, POWDER, FOR SOLUTION INTRAVENOUS at 05:32

## 2024-06-21 RX ADMIN — Medication 25 GRAM(S): at 09:19

## 2024-06-21 RX ADMIN — Medication 1 TABLET(S): at 11:44

## 2024-06-21 RX ADMIN — ALLOPURINOL 100 MILLIGRAM(S): 300 TABLET ORAL at 11:44

## 2024-06-22 LAB
ALBUMIN SERPL ELPH-MCNC: 2.7 G/DL — LOW (ref 3.3–5)
ALP SERPL-CCNC: 825 U/L — HIGH (ref 40–120)
ALT FLD-CCNC: 160 U/L — HIGH (ref 4–41)
ANION GAP SERPL CALC-SCNC: 11 MMOL/L — SIGNIFICANT CHANGE UP (ref 7–14)
APTT BLD: 42 SEC — HIGH (ref 24.5–35.6)
AST SERPL-CCNC: 170 U/L — HIGH (ref 4–40)
BASOPHILS # BLD AUTO: 0.02 K/UL — SIGNIFICANT CHANGE UP (ref 0–0.2)
BASOPHILS NFR BLD AUTO: 0.3 % — SIGNIFICANT CHANGE UP (ref 0–2)
BILIRUB SERPL-MCNC: 15.3 MG/DL — HIGH (ref 0.2–1.2)
BLD GP AB SCN SERPL QL: NEGATIVE — SIGNIFICANT CHANGE UP
BUN SERPL-MCNC: 39 MG/DL — HIGH (ref 7–23)
CALCIUM SERPL-MCNC: 9.1 MG/DL — SIGNIFICANT CHANGE UP (ref 8.4–10.5)
CHLORIDE SERPL-SCNC: 111 MMOL/L — HIGH (ref 98–107)
CO2 SERPL-SCNC: 15 MMOL/L — LOW (ref 22–31)
CREAT SERPL-MCNC: 1.21 MG/DL — SIGNIFICANT CHANGE UP (ref 0.5–1.3)
EGFR: 66 ML/MIN/1.73M2 — SIGNIFICANT CHANGE UP
EOSINOPHIL # BLD AUTO: 0.02 K/UL — SIGNIFICANT CHANGE UP (ref 0–0.5)
EOSINOPHIL NFR BLD AUTO: 0.3 % — SIGNIFICANT CHANGE UP (ref 0–6)
GLUCOSE BLDC GLUCOMTR-MCNC: 102 MG/DL — HIGH (ref 70–99)
GLUCOSE BLDC GLUCOMTR-MCNC: 137 MG/DL — HIGH (ref 70–99)
GLUCOSE BLDC GLUCOMTR-MCNC: 171 MG/DL — HIGH (ref 70–99)
GLUCOSE BLDC GLUCOMTR-MCNC: 189 MG/DL — HIGH (ref 70–99)
GLUCOSE SERPL-MCNC: 184 MG/DL — HIGH (ref 70–99)
HCT VFR BLD CALC: 20.4 % — CRITICAL LOW (ref 39–50)
HGB BLD-MCNC: 7.2 G/DL — LOW (ref 13–17)
IANC: 3.87 K/UL — SIGNIFICANT CHANGE UP (ref 1.8–7.4)
IMM GRANULOCYTES NFR BLD AUTO: 6.1 % — HIGH (ref 0–0.9)
INR BLD: 1.55 RATIO — HIGH (ref 0.85–1.18)
LYMPHOCYTES # BLD AUTO: 1.7 K/UL — SIGNIFICANT CHANGE UP (ref 1–3.3)
LYMPHOCYTES # BLD AUTO: 24.7 % — SIGNIFICANT CHANGE UP (ref 13–44)
MAGNESIUM SERPL-MCNC: 1.3 MG/DL — LOW (ref 1.6–2.6)
MCHC RBC-ENTMCNC: 31.3 PG — SIGNIFICANT CHANGE UP (ref 27–34)
MCHC RBC-ENTMCNC: 35.3 GM/DL — SIGNIFICANT CHANGE UP (ref 32–36)
MCV RBC AUTO: 88.7 FL — SIGNIFICANT CHANGE UP (ref 80–100)
MONOCYTES # BLD AUTO: 0.84 K/UL — SIGNIFICANT CHANGE UP (ref 0–0.9)
MONOCYTES NFR BLD AUTO: 12.2 % — SIGNIFICANT CHANGE UP (ref 2–14)
NEUTROPHILS # BLD AUTO: 3.87 K/UL — SIGNIFICANT CHANGE UP (ref 1.8–7.4)
NEUTROPHILS NFR BLD AUTO: 56.4 % — SIGNIFICANT CHANGE UP (ref 43–77)
NRBC # BLD: 0 /100 WBCS — SIGNIFICANT CHANGE UP (ref 0–0)
NRBC # FLD: 0 K/UL — SIGNIFICANT CHANGE UP (ref 0–0)
PHOSPHATE SERPL-MCNC: 3.5 MG/DL — SIGNIFICANT CHANGE UP (ref 2.5–4.5)
PHOSPHATE SERPL-MCNC: 3.6 MG/DL — SIGNIFICANT CHANGE UP (ref 2.5–4.5)
PLATELET # BLD AUTO: 46 K/UL — LOW (ref 150–400)
POTASSIUM SERPL-MCNC: 4.1 MMOL/L — SIGNIFICANT CHANGE UP (ref 3.5–5.3)
POTASSIUM SERPL-SCNC: 4.1 MMOL/L — SIGNIFICANT CHANGE UP (ref 3.5–5.3)
PROT SERPL-MCNC: 4.9 G/DL — LOW (ref 6–8.3)
PROTHROM AB SERPL-ACNC: 17.1 SEC — HIGH (ref 9.5–13)
RBC # BLD: 2.3 M/UL — LOW (ref 4.2–5.8)
RBC # FLD: 21.6 % — HIGH (ref 10.3–14.5)
RH IG SCN BLD-IMP: POSITIVE — SIGNIFICANT CHANGE UP
SODIUM SERPL-SCNC: 137 MMOL/L — SIGNIFICANT CHANGE UP (ref 135–145)
WBC # BLD: 6.87 K/UL — SIGNIFICANT CHANGE UP (ref 3.8–10.5)
WBC # FLD AUTO: 6.87 K/UL — SIGNIFICANT CHANGE UP (ref 3.8–10.5)

## 2024-06-22 PROCEDURE — 99233 SBSQ HOSP IP/OBS HIGH 50: CPT

## 2024-06-22 RX ORDER — MAGNESIUM SULFATE 100 %
2 POWDER (GRAM) MISCELLANEOUS ONCE
Refills: 0 | Status: COMPLETED | OUTPATIENT
Start: 2024-06-22 | End: 2024-06-22

## 2024-06-22 RX ADMIN — INSULIN LISPRO 1: 100 INJECTION, SOLUTION SUBCUTANEOUS at 18:06

## 2024-06-22 RX ADMIN — Medication 1 TABLET(S): at 12:31

## 2024-06-22 RX ADMIN — Medication 25 GRAM(S): at 12:31

## 2024-06-22 RX ADMIN — ALLOPURINOL 100 MILLIGRAM(S): 300 TABLET ORAL at 12:31

## 2024-06-22 RX ADMIN — PANTOPRAZOLE SODIUM 40 MILLIGRAM(S): 40 INJECTION, POWDER, FOR SOLUTION INTRAVENOUS at 17:40

## 2024-06-22 RX ADMIN — PANTOPRAZOLE SODIUM 40 MILLIGRAM(S): 40 INJECTION, POWDER, FOR SOLUTION INTRAVENOUS at 06:03

## 2024-06-23 LAB
ALBUMIN SERPL ELPH-MCNC: 2.8 G/DL — LOW (ref 3.3–5)
ALP SERPL-CCNC: 941 U/L — HIGH (ref 40–120)
ALT FLD-CCNC: 188 U/L — HIGH (ref 4–41)
ANION GAP SERPL CALC-SCNC: 14 MMOL/L — SIGNIFICANT CHANGE UP (ref 7–14)
AST SERPL-CCNC: 228 U/L — HIGH (ref 4–40)
BASOPHILS # BLD AUTO: 0.01 K/UL — SIGNIFICANT CHANGE UP (ref 0–0.2)
BASOPHILS NFR BLD AUTO: 0.1 % — SIGNIFICANT CHANGE UP (ref 0–2)
BILIRUB SERPL-MCNC: 16.4 MG/DL — HIGH (ref 0.2–1.2)
BLD GP AB SCN SERPL QL: NEGATIVE — SIGNIFICANT CHANGE UP
BUN SERPL-MCNC: 43 MG/DL — HIGH (ref 7–23)
CALCIUM SERPL-MCNC: 8.9 MG/DL — SIGNIFICANT CHANGE UP (ref 8.4–10.5)
CHLORIDE SERPL-SCNC: 110 MMOL/L — HIGH (ref 98–107)
CO2 SERPL-SCNC: 14 MMOL/L — LOW (ref 22–31)
CREAT SERPL-MCNC: 1.19 MG/DL — SIGNIFICANT CHANGE UP (ref 0.5–1.3)
EGFR: 68 ML/MIN/1.73M2 — SIGNIFICANT CHANGE UP
EOSINOPHIL # BLD AUTO: 0.02 K/UL — SIGNIFICANT CHANGE UP (ref 0–0.5)
EOSINOPHIL NFR BLD AUTO: 0.2 % — SIGNIFICANT CHANGE UP (ref 0–6)
GLUCOSE BLDC GLUCOMTR-MCNC: 106 MG/DL — HIGH (ref 70–99)
GLUCOSE BLDC GLUCOMTR-MCNC: 115 MG/DL — HIGH (ref 70–99)
GLUCOSE BLDC GLUCOMTR-MCNC: 161 MG/DL — HIGH (ref 70–99)
GLUCOSE SERPL-MCNC: 92 MG/DL — SIGNIFICANT CHANGE UP (ref 70–99)
HCT VFR BLD CALC: 19.1 % — CRITICAL LOW (ref 39–50)
HGB BLD-MCNC: 6.7 G/DL — CRITICAL LOW (ref 13–17)
IANC: 6.06 K/UL — SIGNIFICANT CHANGE UP (ref 1.8–7.4)
IMM GRANULOCYTES NFR BLD AUTO: 7.4 % — HIGH (ref 0–0.9)
LYMPHOCYTES # BLD AUTO: 1.94 K/UL — SIGNIFICANT CHANGE UP (ref 1–3.3)
LYMPHOCYTES # BLD AUTO: 19.1 % — SIGNIFICANT CHANGE UP (ref 13–44)
MAGNESIUM SERPL-MCNC: 1.3 MG/DL — LOW (ref 1.6–2.6)
MCHC RBC-ENTMCNC: 31 PG — SIGNIFICANT CHANGE UP (ref 27–34)
MCHC RBC-ENTMCNC: 35.1 GM/DL — SIGNIFICANT CHANGE UP (ref 32–36)
MCV RBC AUTO: 88.4 FL — SIGNIFICANT CHANGE UP (ref 80–100)
MONOCYTES # BLD AUTO: 1.38 K/UL — HIGH (ref 0–0.9)
MONOCYTES NFR BLD AUTO: 13.6 % — SIGNIFICANT CHANGE UP (ref 2–14)
NEUTROPHILS # BLD AUTO: 6.06 K/UL — SIGNIFICANT CHANGE UP (ref 1.8–7.4)
NEUTROPHILS NFR BLD AUTO: 59.6 % — SIGNIFICANT CHANGE UP (ref 43–77)
NRBC # BLD: 0 /100 WBCS — SIGNIFICANT CHANGE UP (ref 0–0)
NRBC # FLD: 0 K/UL — SIGNIFICANT CHANGE UP (ref 0–0)
PHOSPHATE SERPL-MCNC: 3.6 MG/DL — SIGNIFICANT CHANGE UP (ref 2.5–4.5)
PLATELET # BLD AUTO: 44 K/UL — LOW (ref 150–400)
POTASSIUM SERPL-MCNC: 4.3 MMOL/L — SIGNIFICANT CHANGE UP (ref 3.5–5.3)
POTASSIUM SERPL-SCNC: 4.3 MMOL/L — SIGNIFICANT CHANGE UP (ref 3.5–5.3)
PROT SERPL-MCNC: 5.2 G/DL — LOW (ref 6–8.3)
RBC # BLD: 2.16 M/UL — LOW (ref 4.2–5.8)
RBC # FLD: 21.5 % — HIGH (ref 10.3–14.5)
RH IG SCN BLD-IMP: POSITIVE — SIGNIFICANT CHANGE UP
SODIUM SERPL-SCNC: 138 MMOL/L — SIGNIFICANT CHANGE UP (ref 135–145)
WBC # BLD: 10.16 K/UL — SIGNIFICANT CHANGE UP (ref 3.8–10.5)
WBC # FLD AUTO: 10.16 K/UL — SIGNIFICANT CHANGE UP (ref 3.8–10.5)

## 2024-06-23 PROCEDURE — 99232 SBSQ HOSP IP/OBS MODERATE 35: CPT

## 2024-06-23 PROCEDURE — 99233 SBSQ HOSP IP/OBS HIGH 50: CPT

## 2024-06-23 RX ORDER — MAGNESIUM SULFATE 100 %
2 POWDER (GRAM) MISCELLANEOUS ONCE
Refills: 0 | Status: COMPLETED | OUTPATIENT
Start: 2024-06-23 | End: 2024-06-23

## 2024-06-23 RX ADMIN — PANTOPRAZOLE SODIUM 40 MILLIGRAM(S): 40 INJECTION, POWDER, FOR SOLUTION INTRAVENOUS at 05:38

## 2024-06-23 RX ADMIN — PANTOPRAZOLE SODIUM 40 MILLIGRAM(S): 40 INJECTION, POWDER, FOR SOLUTION INTRAVENOUS at 18:01

## 2024-06-23 RX ADMIN — Medication 1 TABLET(S): at 11:11

## 2024-06-23 RX ADMIN — INSULIN LISPRO 1: 100 INJECTION, SOLUTION SUBCUTANEOUS at 12:29

## 2024-06-23 RX ADMIN — ALLOPURINOL 100 MILLIGRAM(S): 300 TABLET ORAL at 11:11

## 2024-06-23 RX ADMIN — Medication 25 GRAM(S): at 08:28

## 2024-06-24 LAB
ACANTHOCYTES BLD QL SMEAR: SLIGHT — SIGNIFICANT CHANGE UP
ALBUMIN SERPL ELPH-MCNC: 2.7 G/DL — LOW (ref 3.3–5)
ALP SERPL-CCNC: 895 U/L — HIGH (ref 40–120)
ALT FLD-CCNC: 182 U/L — HIGH (ref 4–41)
ANION GAP SERPL CALC-SCNC: 12 MMOL/L — SIGNIFICANT CHANGE UP (ref 7–14)
ANISOCYTOSIS BLD QL: SIGNIFICANT CHANGE UP
APTT BLD: 39.8 SEC — HIGH (ref 24.5–35.6)
AST SERPL-CCNC: 213 U/L — HIGH (ref 4–40)
BASOPHILS # BLD AUTO: 0 K/UL — SIGNIFICANT CHANGE UP (ref 0–0.2)
BASOPHILS NFR BLD AUTO: 0 % — SIGNIFICANT CHANGE UP (ref 0–2)
BILIRUB SERPL-MCNC: 17.3 MG/DL — HIGH (ref 0.2–1.2)
BUN SERPL-MCNC: 34 MG/DL — HIGH (ref 7–23)
BURR CELLS BLD QL SMEAR: PRESENT — SIGNIFICANT CHANGE UP
CALCIUM SERPL-MCNC: 9 MG/DL — SIGNIFICANT CHANGE UP (ref 8.4–10.5)
CHLORIDE SERPL-SCNC: 108 MMOL/L — HIGH (ref 98–107)
CO2 SERPL-SCNC: 14 MMOL/L — LOW (ref 22–31)
CREAT SERPL-MCNC: 1.04 MG/DL — SIGNIFICANT CHANGE UP (ref 0.5–1.3)
DNA PLOIDY SPEC FC-IMP: SIGNIFICANT CHANGE UP
EGFR: 80 ML/MIN/1.73M2 — SIGNIFICANT CHANGE UP
EOSINOPHIL # BLD AUTO: 0 K/UL — SIGNIFICANT CHANGE UP (ref 0–0.5)
EOSINOPHIL NFR BLD AUTO: 0 % — SIGNIFICANT CHANGE UP (ref 0–6)
GIANT PLATELETS BLD QL SMEAR: PRESENT — SIGNIFICANT CHANGE UP
GLUCOSE BLDC GLUCOMTR-MCNC: 101 MG/DL — HIGH (ref 70–99)
GLUCOSE BLDC GLUCOMTR-MCNC: 106 MG/DL — HIGH (ref 70–99)
GLUCOSE BLDC GLUCOMTR-MCNC: 106 MG/DL — HIGH (ref 70–99)
GLUCOSE BLDC GLUCOMTR-MCNC: 109 MG/DL — HIGH (ref 70–99)
GLUCOSE BLDC GLUCOMTR-MCNC: 159 MG/DL — HIGH (ref 70–99)
GLUCOSE BLDC GLUCOMTR-MCNC: 211 MG/DL — HIGH (ref 70–99)
GLUCOSE BLDC GLUCOMTR-MCNC: 94 MG/DL — SIGNIFICANT CHANGE UP (ref 70–99)
GLUCOSE SERPL-MCNC: 106 MG/DL — HIGH (ref 70–99)
HCT VFR BLD CALC: 23 % — LOW (ref 39–50)
HGB BLD-MCNC: 8.2 G/DL — LOW (ref 13–17)
HYPOCHROMIA BLD QL: SLIGHT — SIGNIFICANT CHANGE UP
IANC: 8.82 K/UL — HIGH (ref 1.8–7.4)
INR BLD: 1.46 RATIO — HIGH (ref 0.85–1.18)
LYMPHOCYTES # BLD AUTO: 1.38 K/UL — SIGNIFICANT CHANGE UP (ref 1–3.3)
LYMPHOCYTES # BLD AUTO: 10.4 % — LOW (ref 13–44)
MAGNESIUM SERPL-MCNC: 1.2 MG/DL — LOW (ref 1.6–2.6)
MCHC RBC-ENTMCNC: 31.5 PG — SIGNIFICANT CHANGE UP (ref 27–34)
MCHC RBC-ENTMCNC: 35.7 GM/DL — SIGNIFICANT CHANGE UP (ref 32–36)
MCV RBC AUTO: 88.5 FL — SIGNIFICANT CHANGE UP (ref 80–100)
MONOCYTES # BLD AUTO: 1.16 K/UL — HIGH (ref 0–0.9)
MONOCYTES NFR BLD AUTO: 8.7 % — SIGNIFICANT CHANGE UP (ref 2–14)
MYELOCYTES NFR BLD: 0.9 % — HIGH (ref 0–0)
NEUTROPHILS # BLD AUTO: 10.17 K/UL — HIGH (ref 1.8–7.4)
NEUTROPHILS NFR BLD AUTO: 75.6 % — SIGNIFICANT CHANGE UP (ref 43–77)
NEUTS BAND # BLD: 0.9 % — SIGNIFICANT CHANGE UP (ref 0–6)
OVALOCYTES BLD QL SMEAR: SLIGHT — SIGNIFICANT CHANGE UP
PHOSPHATE SERPL-MCNC: 3.2 MG/DL — SIGNIFICANT CHANGE UP (ref 2.5–4.5)
PLAT MORPH BLD: NORMAL — SIGNIFICANT CHANGE UP
PLATELET # BLD AUTO: 56 K/UL — LOW (ref 150–400)
PLATELET COUNT - ESTIMATE: ABNORMAL
POIKILOCYTOSIS BLD QL AUTO: SIGNIFICANT CHANGE UP
POTASSIUM SERPL-MCNC: 4.1 MMOL/L — SIGNIFICANT CHANGE UP (ref 3.5–5.3)
POTASSIUM SERPL-SCNC: 4.1 MMOL/L — SIGNIFICANT CHANGE UP (ref 3.5–5.3)
PROT SERPL-MCNC: 5.2 G/DL — LOW (ref 6–8.3)
PROTHROM AB SERPL-ACNC: 16.2 SEC — HIGH (ref 9.5–13)
RBC # BLD: 2.6 M/UL — LOW (ref 4.2–5.8)
RBC # FLD: 19.9 % — HIGH (ref 10.3–14.5)
RBC BLD AUTO: ABNORMAL
SCHISTOCYTES BLD QL AUTO: SLIGHT — SIGNIFICANT CHANGE UP
SODIUM SERPL-SCNC: 134 MMOL/L — LOW (ref 135–145)
TARGETS BLD QL SMEAR: SLIGHT — SIGNIFICANT CHANGE UP
VARIANT LYMPHS # BLD: 3.5 % — SIGNIFICANT CHANGE UP (ref 0–6)
WBC # BLD: 13.3 K/UL — HIGH (ref 3.8–10.5)
WBC # FLD AUTO: 13.3 K/UL — HIGH (ref 3.8–10.5)

## 2024-06-24 PROCEDURE — 88341 IMHCHEM/IMCYTCHM EA ADD ANTB: CPT | Mod: 26

## 2024-06-24 PROCEDURE — 88307 TISSUE EXAM BY PATHOLOGIST: CPT | Mod: 26

## 2024-06-24 PROCEDURE — 43239 EGD BIOPSY SINGLE/MULTIPLE: CPT

## 2024-06-24 PROCEDURE — 88305 TISSUE EXAM BY PATHOLOGIST: CPT | Mod: 26

## 2024-06-24 PROCEDURE — 43237 ENDOSCOPIC US EXAM ESOPH: CPT

## 2024-06-24 PROCEDURE — 88342 IMHCHEM/IMCYTCHM 1ST ANTB: CPT | Mod: 26

## 2024-06-24 RX ORDER — INSULIN LISPRO 100 [IU]/ML
INJECTION, SOLUTION SUBCUTANEOUS
Refills: 0 | Status: DISCONTINUED | OUTPATIENT
Start: 2024-06-24 | End: 2024-06-25

## 2024-06-24 RX ORDER — MAGNESIUM SULFATE 100 %
2 POWDER (GRAM) MISCELLANEOUS ONCE
Refills: 0 | Status: COMPLETED | OUTPATIENT
Start: 2024-06-24 | End: 2024-06-24

## 2024-06-24 RX ORDER — INSULIN LISPRO 100 [IU]/ML
INJECTION, SOLUTION SUBCUTANEOUS AT BEDTIME
Refills: 0 | Status: DISCONTINUED | OUTPATIENT
Start: 2024-06-24 | End: 2024-06-25

## 2024-06-24 RX ORDER — INSULIN LISPRO 100 [IU]/ML
INJECTION, SOLUTION SUBCUTANEOUS EVERY 6 HOURS
Refills: 0 | Status: DISCONTINUED | OUTPATIENT
Start: 2024-06-24 | End: 2024-06-24

## 2024-06-24 RX ADMIN — PANTOPRAZOLE SODIUM 40 MILLIGRAM(S): 40 INJECTION, POWDER, FOR SOLUTION INTRAVENOUS at 06:03

## 2024-06-24 RX ADMIN — ALLOPURINOL 100 MILLIGRAM(S): 300 TABLET ORAL at 16:33

## 2024-06-24 RX ADMIN — PANTOPRAZOLE SODIUM 40 MILLIGRAM(S): 40 INJECTION, POWDER, FOR SOLUTION INTRAVENOUS at 17:59

## 2024-06-24 RX ADMIN — Medication 1 TABLET(S): at 16:32

## 2024-06-24 RX ADMIN — Medication 25 GRAM(S): at 04:55

## 2024-06-24 RX ADMIN — INSULIN LISPRO 2: 100 INJECTION, SOLUTION SUBCUTANEOUS at 17:59

## 2024-06-25 LAB
ALBUMIN SERPL ELPH-MCNC: 2.8 G/DL — LOW (ref 3.3–5)
ALP SERPL-CCNC: 892 U/L — HIGH (ref 40–120)
ALT FLD-CCNC: 171 U/L — HIGH (ref 4–41)
ANION GAP SERPL CALC-SCNC: 12 MMOL/L — SIGNIFICANT CHANGE UP (ref 7–14)
APTT BLD: 39.8 SEC — HIGH (ref 24.5–35.6)
AST SERPL-CCNC: 203 U/L — HIGH (ref 4–40)
BASOPHILS # BLD AUTO: 0.02 K/UL — SIGNIFICANT CHANGE UP (ref 0–0.2)
BASOPHILS NFR BLD AUTO: 0.2 % — SIGNIFICANT CHANGE UP (ref 0–2)
BILIRUB SERPL-MCNC: 19.3 MG/DL — HIGH (ref 0.2–1.2)
BLD GP AB SCN SERPL QL: NEGATIVE — SIGNIFICANT CHANGE UP
BUN SERPL-MCNC: 32 MG/DL — HIGH (ref 7–23)
CALCIUM SERPL-MCNC: 9.3 MG/DL — SIGNIFICANT CHANGE UP (ref 8.4–10.5)
CHLORIDE SERPL-SCNC: 111 MMOL/L — HIGH (ref 98–107)
CO2 SERPL-SCNC: 15 MMOL/L — LOW (ref 22–31)
CREAT SERPL-MCNC: 1.13 MG/DL — SIGNIFICANT CHANGE UP (ref 0.5–1.3)
EGFR: 72 ML/MIN/1.73M2 — SIGNIFICANT CHANGE UP
EOSINOPHIL # BLD AUTO: 0.03 K/UL — SIGNIFICANT CHANGE UP (ref 0–0.5)
EOSINOPHIL NFR BLD AUTO: 0.3 % — SIGNIFICANT CHANGE UP (ref 0–6)
GLUCOSE BLDC GLUCOMTR-MCNC: 111 MG/DL — HIGH (ref 70–99)
GLUCOSE BLDC GLUCOMTR-MCNC: 136 MG/DL — HIGH (ref 70–99)
GLUCOSE BLDC GLUCOMTR-MCNC: 146 MG/DL — HIGH (ref 70–99)
GLUCOSE BLDC GLUCOMTR-MCNC: 92 MG/DL — SIGNIFICANT CHANGE UP (ref 70–99)
GLUCOSE SERPL-MCNC: 99 MG/DL — SIGNIFICANT CHANGE UP (ref 70–99)
HCT VFR BLD CALC: 23 % — LOW (ref 39–50)
HGB BLD-MCNC: 8.1 G/DL — LOW (ref 13–17)
IANC: 7.09 K/UL — SIGNIFICANT CHANGE UP (ref 1.8–7.4)
IMM GRANULOCYTES NFR BLD AUTO: 8.2 % — HIGH (ref 0–0.9)
INR BLD: 1.54 RATIO — HIGH (ref 0.85–1.18)
JAK2 P.V617F BLD/T QL: SIGNIFICANT CHANGE UP
LYMPHOCYTES # BLD AUTO: 1.31 K/UL — SIGNIFICANT CHANGE UP (ref 1–3.3)
LYMPHOCYTES # BLD AUTO: 12.4 % — LOW (ref 13–44)
MAGNESIUM SERPL-MCNC: 1.3 MG/DL — LOW (ref 1.6–2.6)
MCHC RBC-ENTMCNC: 31.2 PG — SIGNIFICANT CHANGE UP (ref 27–34)
MCHC RBC-ENTMCNC: 35.2 GM/DL — SIGNIFICANT CHANGE UP (ref 32–36)
MCV RBC AUTO: 88.5 FL — SIGNIFICANT CHANGE UP (ref 80–100)
MONOCYTES # BLD AUTO: 1.26 K/UL — HIGH (ref 0–0.9)
MONOCYTES NFR BLD AUTO: 11.9 % — SIGNIFICANT CHANGE UP (ref 2–14)
NEUTROPHILS # BLD AUTO: 7.09 K/UL — SIGNIFICANT CHANGE UP (ref 1.8–7.4)
NEUTROPHILS NFR BLD AUTO: 67 % — SIGNIFICANT CHANGE UP (ref 43–77)
NRBC # BLD: 0 /100 WBCS — SIGNIFICANT CHANGE UP (ref 0–0)
NRBC # FLD: 0 K/UL — SIGNIFICANT CHANGE UP (ref 0–0)
PHOSPHATE SERPL-MCNC: 3.6 MG/DL — SIGNIFICANT CHANGE UP (ref 2.5–4.5)
PLATELET # BLD AUTO: 70 K/UL — LOW (ref 150–400)
POTASSIUM SERPL-MCNC: 4.3 MMOL/L — SIGNIFICANT CHANGE UP (ref 3.5–5.3)
POTASSIUM SERPL-SCNC: 4.3 MMOL/L — SIGNIFICANT CHANGE UP (ref 3.5–5.3)
PROT SERPL-MCNC: 5.4 G/DL — LOW (ref 6–8.3)
PROTHROM AB SERPL-ACNC: 17.2 SEC — HIGH (ref 9.5–13)
RBC # BLD: 2.6 M/UL — LOW (ref 4.2–5.8)
RBC # FLD: 21.2 % — HIGH (ref 10.3–14.5)
RH IG SCN BLD-IMP: POSITIVE — SIGNIFICANT CHANGE UP
SODIUM SERPL-SCNC: 138 MMOL/L — SIGNIFICANT CHANGE UP (ref 135–145)
TM INTERPRETATION: SIGNIFICANT CHANGE UP
WBC # BLD: 10.58 K/UL — HIGH (ref 3.8–10.5)
WBC # FLD AUTO: 10.58 K/UL — HIGH (ref 3.8–10.5)

## 2024-06-25 PROCEDURE — 99232 SBSQ HOSP IP/OBS MODERATE 35: CPT | Mod: GC

## 2024-06-25 RX ORDER — INSULIN LISPRO 100 [IU]/ML
INJECTION, SOLUTION SUBCUTANEOUS EVERY 6 HOURS
Refills: 0 | Status: DISCONTINUED | OUTPATIENT
Start: 2024-06-25 | End: 2024-06-27

## 2024-06-25 RX ORDER — MAGNESIUM SULFATE 100 %
2 POWDER (GRAM) MISCELLANEOUS ONCE
Refills: 0 | Status: COMPLETED | OUTPATIENT
Start: 2024-06-25 | End: 2024-06-25

## 2024-06-25 RX ADMIN — ALLOPURINOL 100 MILLIGRAM(S): 300 TABLET ORAL at 11:51

## 2024-06-25 RX ADMIN — Medication 25 GRAM(S): at 09:02

## 2024-06-25 RX ADMIN — Medication 1 TABLET(S): at 11:51

## 2024-06-25 RX ADMIN — PANTOPRAZOLE SODIUM 40 MILLIGRAM(S): 40 INJECTION, POWDER, FOR SOLUTION INTRAVENOUS at 06:08

## 2024-06-25 RX ADMIN — PANTOPRAZOLE SODIUM 40 MILLIGRAM(S): 40 INJECTION, POWDER, FOR SOLUTION INTRAVENOUS at 17:31

## 2024-06-26 LAB
ALBUMIN SERPL ELPH-MCNC: 2.6 G/DL — LOW (ref 3.3–5)
ALP SERPL-CCNC: 865 U/L — HIGH (ref 40–120)
ALT FLD-CCNC: 185 U/L — HIGH (ref 4–41)
ANION GAP SERPL CALC-SCNC: 13 MMOL/L — SIGNIFICANT CHANGE UP (ref 7–14)
APTT BLD: 41.8 SEC — HIGH (ref 24.5–35.6)
AST SERPL-CCNC: 220 U/L — HIGH (ref 4–40)
BASOPHILS # BLD AUTO: 0.01 K/UL — SIGNIFICANT CHANGE UP (ref 0–0.2)
BASOPHILS NFR BLD AUTO: 0.1 % — SIGNIFICANT CHANGE UP (ref 0–2)
BILIRUB SERPL-MCNC: 17.8 MG/DL — HIGH (ref 0.2–1.2)
BUN SERPL-MCNC: 30 MG/DL — HIGH (ref 7–23)
CALCIUM SERPL-MCNC: 8.9 MG/DL — SIGNIFICANT CHANGE UP (ref 8.4–10.5)
CHLORIDE SERPL-SCNC: 110 MMOL/L — HIGH (ref 98–107)
CHROM ANALY INTERPHASE BLD FISH-IMP: SIGNIFICANT CHANGE UP
CO2 SERPL-SCNC: 13 MMOL/L — LOW (ref 22–31)
CREAT SERPL-MCNC: 1.1 MG/DL — SIGNIFICANT CHANGE UP (ref 0.5–1.3)
EGFR: 74 ML/MIN/1.73M2 — SIGNIFICANT CHANGE UP
EOSINOPHIL # BLD AUTO: 0.03 K/UL — SIGNIFICANT CHANGE UP (ref 0–0.5)
EOSINOPHIL NFR BLD AUTO: 0.3 % — SIGNIFICANT CHANGE UP (ref 0–6)
GLUCOSE BLDC GLUCOMTR-MCNC: 100 MG/DL — HIGH (ref 70–99)
GLUCOSE BLDC GLUCOMTR-MCNC: 134 MG/DL — HIGH (ref 70–99)
GLUCOSE BLDC GLUCOMTR-MCNC: 235 MG/DL — HIGH (ref 70–99)
GLUCOSE BLDC GLUCOMTR-MCNC: 85 MG/DL — SIGNIFICANT CHANGE UP (ref 70–99)
GLUCOSE SERPL-MCNC: 110 MG/DL — HIGH (ref 70–99)
HCT VFR BLD CALC: 21.7 % — LOW (ref 39–50)
HGB BLD-MCNC: 8 G/DL — LOW (ref 13–17)
IANC: 6.55 K/UL — SIGNIFICANT CHANGE UP (ref 1.8–7.4)
IMM GRANULOCYTES NFR BLD AUTO: 10 % — HIGH (ref 0–0.9)
INR BLD: 1.56 RATIO — HIGH (ref 0.85–1.18)
LYMPHOCYTES # BLD AUTO: 1.57 K/UL — SIGNIFICANT CHANGE UP (ref 1–3.3)
LYMPHOCYTES # BLD AUTO: 14.8 % — SIGNIFICANT CHANGE UP (ref 13–44)
MAGNESIUM SERPL-MCNC: 1.4 MG/DL — LOW (ref 1.6–2.6)
MCHC RBC-ENTMCNC: 31.7 PG — SIGNIFICANT CHANGE UP (ref 27–34)
MCHC RBC-ENTMCNC: 36.9 GM/DL — HIGH (ref 32–36)
MCV RBC AUTO: 86.1 FL — SIGNIFICANT CHANGE UP (ref 80–100)
MONOCYTES # BLD AUTO: 1.36 K/UL — HIGH (ref 0–0.9)
MONOCYTES NFR BLD AUTO: 12.9 % — SIGNIFICANT CHANGE UP (ref 2–14)
NEUTROPHILS # BLD AUTO: 6.55 K/UL — SIGNIFICANT CHANGE UP (ref 1.8–7.4)
NEUTROPHILS NFR BLD AUTO: 61.9 % — SIGNIFICANT CHANGE UP (ref 43–77)
NRBC # BLD: 0 /100 WBCS — SIGNIFICANT CHANGE UP (ref 0–0)
NRBC # FLD: 0 K/UL — SIGNIFICANT CHANGE UP (ref 0–0)
PHOSPHATE SERPL-MCNC: 3.4 MG/DL — SIGNIFICANT CHANGE UP (ref 2.5–4.5)
PLATELET # BLD AUTO: 58 K/UL — LOW (ref 150–400)
POTASSIUM SERPL-MCNC: 4.1 MMOL/L — SIGNIFICANT CHANGE UP (ref 3.5–5.3)
POTASSIUM SERPL-SCNC: 4.1 MMOL/L — SIGNIFICANT CHANGE UP (ref 3.5–5.3)
PROT SERPL-MCNC: 5 G/DL — LOW (ref 6–8.3)
PROTHROM AB SERPL-ACNC: 17.2 SEC — HIGH (ref 9.5–13)
RBC # BLD: 2.52 M/UL — LOW (ref 4.2–5.8)
RBC # FLD: 21.2 % — HIGH (ref 10.3–14.5)
SODIUM SERPL-SCNC: 136 MMOL/L — SIGNIFICANT CHANGE UP (ref 135–145)
WBC # BLD: 10.58 K/UL — HIGH (ref 3.8–10.5)
WBC # FLD AUTO: 10.58 K/UL — HIGH (ref 3.8–10.5)

## 2024-06-26 PROCEDURE — 99232 SBSQ HOSP IP/OBS MODERATE 35: CPT | Mod: GC

## 2024-06-26 RX ORDER — MAGNESIUM SULFATE 100 %
2 POWDER (GRAM) MISCELLANEOUS ONCE
Refills: 0 | Status: COMPLETED | OUTPATIENT
Start: 2024-06-26 | End: 2024-06-26

## 2024-06-26 RX ADMIN — Medication 1 TABLET(S): at 12:47

## 2024-06-26 RX ADMIN — INSULIN LISPRO 2: 100 INJECTION, SOLUTION SUBCUTANEOUS at 17:55

## 2024-06-26 RX ADMIN — Medication 25 GRAM(S): at 06:12

## 2024-06-26 RX ADMIN — PANTOPRAZOLE SODIUM 40 MILLIGRAM(S): 40 INJECTION, POWDER, FOR SOLUTION INTRAVENOUS at 17:55

## 2024-06-26 RX ADMIN — ALLOPURINOL 100 MILLIGRAM(S): 300 TABLET ORAL at 12:47

## 2024-06-26 RX ADMIN — PANTOPRAZOLE SODIUM 40 MILLIGRAM(S): 40 INJECTION, POWDER, FOR SOLUTION INTRAVENOUS at 06:13

## 2024-06-27 LAB
ALBUMIN SERPL ELPH-MCNC: 2.6 G/DL — LOW (ref 3.3–5)
ALP SERPL-CCNC: 820 U/L — HIGH (ref 40–120)
ALT FLD-CCNC: 178 U/L — HIGH (ref 4–41)
ANION GAP SERPL CALC-SCNC: 11 MMOL/L — SIGNIFICANT CHANGE UP (ref 7–14)
APTT BLD: 39 SEC — HIGH (ref 24.5–35.6)
AST SERPL-CCNC: 215 U/L — HIGH (ref 4–40)
BASOPHILS # BLD AUTO: 0.02 K/UL — SIGNIFICANT CHANGE UP (ref 0–0.2)
BASOPHILS NFR BLD AUTO: 0.2 % — SIGNIFICANT CHANGE UP (ref 0–2)
BILIRUB SERPL-MCNC: 17.8 MG/DL — HIGH (ref 0.2–1.2)
BLD GP AB SCN SERPL QL: NEGATIVE — SIGNIFICANT CHANGE UP
BUN SERPL-MCNC: 30 MG/DL — HIGH (ref 7–23)
CALCIUM SERPL-MCNC: 8.6 MG/DL — SIGNIFICANT CHANGE UP (ref 8.4–10.5)
CHLORIDE SERPL-SCNC: 107 MMOL/L — SIGNIFICANT CHANGE UP (ref 98–107)
CO2 SERPL-SCNC: 13 MMOL/L — LOW (ref 22–31)
CREAT SERPL-MCNC: 1.3 MG/DL — SIGNIFICANT CHANGE UP (ref 0.5–1.3)
EGFR: 61 ML/MIN/1.73M2 — SIGNIFICANT CHANGE UP
EOSINOPHIL # BLD AUTO: 0.03 K/UL — SIGNIFICANT CHANGE UP (ref 0–0.5)
EOSINOPHIL NFR BLD AUTO: 0.3 % — SIGNIFICANT CHANGE UP (ref 0–6)
GLUCOSE BLDC GLUCOMTR-MCNC: 113 MG/DL — HIGH (ref 70–99)
GLUCOSE BLDC GLUCOMTR-MCNC: 122 MG/DL — HIGH (ref 70–99)
GLUCOSE BLDC GLUCOMTR-MCNC: 144 MG/DL — HIGH (ref 70–99)
GLUCOSE BLDC GLUCOMTR-MCNC: 157 MG/DL — HIGH (ref 70–99)
GLUCOSE BLDC GLUCOMTR-MCNC: 162 MG/DL — HIGH (ref 70–99)
GLUCOSE SERPL-MCNC: 133 MG/DL — HIGH (ref 70–99)
HCT VFR BLD CALC: 22.2 % — LOW (ref 39–50)
HGB BLD-MCNC: 7.8 G/DL — LOW (ref 13–17)
IANC: 6.22 K/UL — SIGNIFICANT CHANGE UP (ref 1.8–7.4)
IMM GRANULOCYTES NFR BLD AUTO: 10.1 % — HIGH (ref 0–0.9)
INR BLD: 1.71 RATIO — HIGH (ref 0.85–1.18)
LYMPHOCYTES # BLD AUTO: 1.78 K/UL — SIGNIFICANT CHANGE UP (ref 1–3.3)
LYMPHOCYTES # BLD AUTO: 16.1 % — SIGNIFICANT CHANGE UP (ref 13–44)
MAGNESIUM SERPL-MCNC: 1.3 MG/DL — LOW (ref 1.6–2.6)
MCHC RBC-ENTMCNC: 30.6 PG — SIGNIFICANT CHANGE UP (ref 27–34)
MCHC RBC-ENTMCNC: 35.1 GM/DL — SIGNIFICANT CHANGE UP (ref 32–36)
MCV RBC AUTO: 87.1 FL — SIGNIFICANT CHANGE UP (ref 80–100)
MONOCYTES # BLD AUTO: 1.88 K/UL — HIGH (ref 0–0.9)
MONOCYTES NFR BLD AUTO: 17 % — HIGH (ref 2–14)
NEUTROPHILS # BLD AUTO: 6.22 K/UL — SIGNIFICANT CHANGE UP (ref 1.8–7.4)
NEUTROPHILS NFR BLD AUTO: 56.3 % — SIGNIFICANT CHANGE UP (ref 43–77)
NRBC # BLD: 0 /100 WBCS — SIGNIFICANT CHANGE UP (ref 0–0)
NRBC # FLD: 0 K/UL — SIGNIFICANT CHANGE UP (ref 0–0)
PHOSPHATE SERPL-MCNC: 3.1 MG/DL — SIGNIFICANT CHANGE UP (ref 2.5–4.5)
PLATELET # BLD AUTO: 55 K/UL — LOW (ref 150–400)
POTASSIUM SERPL-MCNC: 4.5 MMOL/L — SIGNIFICANT CHANGE UP (ref 3.5–5.3)
POTASSIUM SERPL-SCNC: 4.5 MMOL/L — SIGNIFICANT CHANGE UP (ref 3.5–5.3)
PROT SERPL-MCNC: 4.8 G/DL — LOW (ref 6–8.3)
PROTHROM AB SERPL-ACNC: 19 SEC — HIGH (ref 9.5–13)
RBC # BLD: 2.55 M/UL — LOW (ref 4.2–5.8)
RBC # FLD: 22 % — HIGH (ref 10.3–14.5)
RH IG SCN BLD-IMP: POSITIVE — SIGNIFICANT CHANGE UP
SODIUM SERPL-SCNC: 131 MMOL/L — LOW (ref 135–145)
SURGICAL PATHOLOGY STUDY: SIGNIFICANT CHANGE UP
WBC # BLD: 11.04 K/UL — HIGH (ref 3.8–10.5)
WBC # FLD AUTO: 11.04 K/UL — HIGH (ref 3.8–10.5)

## 2024-06-27 PROCEDURE — 88364 INSITU HYBRIDIZATION (FISH): CPT | Mod: 26

## 2024-06-27 PROCEDURE — 88341 IMHCHEM/IMCYTCHM EA ADD ANTB: CPT | Mod: 26

## 2024-06-27 PROCEDURE — 88342 IMHCHEM/IMCYTCHM 1ST ANTB: CPT | Mod: 26

## 2024-06-27 PROCEDURE — 77002 NEEDLE LOCALIZATION BY XRAY: CPT | Mod: 26

## 2024-06-27 PROCEDURE — G0452: CPT | Mod: 26

## 2024-06-27 PROCEDURE — 85097 BONE MARROW INTERPRETATION: CPT

## 2024-06-27 PROCEDURE — 88291 CYTO/MOLECULAR REPORT: CPT | Mod: 59

## 2024-06-27 PROCEDURE — 88313 SPECIAL STAINS GROUP 2: CPT | Mod: 26

## 2024-06-27 PROCEDURE — 99232 SBSQ HOSP IP/OBS MODERATE 35: CPT | Mod: GC

## 2024-06-27 PROCEDURE — 38222 DX BONE MARROW BX & ASPIR: CPT | Mod: RT

## 2024-06-27 PROCEDURE — 88189 FLOWCYTOMETRY/READ 16 & >: CPT | Mod: 59

## 2024-06-27 PROCEDURE — 88365 INSITU HYBRIDIZATION (FISH): CPT | Mod: 26

## 2024-06-27 PROCEDURE — 88305 TISSUE EXAM BY PATHOLOGIST: CPT | Mod: 26

## 2024-06-27 RX ORDER — MAGNESIUM SULFATE 100 %
2 POWDER (GRAM) MISCELLANEOUS ONCE
Refills: 0 | Status: COMPLETED | OUTPATIENT
Start: 2024-06-27 | End: 2024-06-27

## 2024-06-27 RX ORDER — INSULIN LISPRO 100 [IU]/ML
INJECTION, SOLUTION SUBCUTANEOUS AT BEDTIME
Refills: 0 | Status: DISCONTINUED | OUTPATIENT
Start: 2024-06-27 | End: 2024-07-01

## 2024-06-27 RX ORDER — INSULIN LISPRO 100 [IU]/ML
INJECTION, SOLUTION SUBCUTANEOUS
Refills: 0 | Status: DISCONTINUED | OUTPATIENT
Start: 2024-06-27 | End: 2024-07-01

## 2024-06-27 RX ADMIN — INSULIN LISPRO 1: 100 INJECTION, SOLUTION SUBCUTANEOUS at 05:49

## 2024-06-27 RX ADMIN — ALLOPURINOL 100 MILLIGRAM(S): 300 TABLET ORAL at 11:51

## 2024-06-27 RX ADMIN — Medication 1 TABLET(S): at 11:51

## 2024-06-27 RX ADMIN — Medication 25 GRAM(S): at 04:49

## 2024-06-27 RX ADMIN — PANTOPRAZOLE SODIUM 40 MILLIGRAM(S): 40 INJECTION, POWDER, FOR SOLUTION INTRAVENOUS at 17:14

## 2024-06-27 RX ADMIN — INSULIN LISPRO 1: 100 INJECTION, SOLUTION SUBCUTANEOUS at 17:48

## 2024-06-27 RX ADMIN — PANTOPRAZOLE SODIUM 40 MILLIGRAM(S): 40 INJECTION, POWDER, FOR SOLUTION INTRAVENOUS at 05:21

## 2024-06-28 LAB
ALBUMIN SERPL ELPH-MCNC: 2.4 G/DL — LOW (ref 3.3–5)
ALP SERPL-CCNC: 804 U/L — HIGH (ref 40–120)
ALT FLD-CCNC: 157 U/L — HIGH (ref 4–41)
ANION GAP SERPL CALC-SCNC: 12 MMOL/L — SIGNIFICANT CHANGE UP (ref 7–14)
ANISOCYTOSIS BLD QL: SIGNIFICANT CHANGE UP
AST SERPL-CCNC: 166 U/L — HIGH (ref 4–40)
BASOPHILS # BLD AUTO: 0 K/UL — SIGNIFICANT CHANGE UP (ref 0–0.2)
BASOPHILS NFR BLD AUTO: 0 % — SIGNIFICANT CHANGE UP (ref 0–2)
BILIRUB SERPL-MCNC: 16.7 MG/DL — HIGH (ref 0.2–1.2)
BUN SERPL-MCNC: 33 MG/DL — HIGH (ref 7–23)
CALCIUM SERPL-MCNC: 8.5 MG/DL — SIGNIFICANT CHANGE UP (ref 8.4–10.5)
CHLORIDE SERPL-SCNC: 107 MMOL/L — SIGNIFICANT CHANGE UP (ref 98–107)
CO2 SERPL-SCNC: 14 MMOL/L — LOW (ref 22–31)
CREAT SERPL-MCNC: 1.44 MG/DL — HIGH (ref 0.5–1.3)
DACRYOCYTES BLD QL SMEAR: SLIGHT — SIGNIFICANT CHANGE UP
EGFR: 54 ML/MIN/1.73M2 — LOW
EOSINOPHIL # BLD AUTO: 0 K/UL — SIGNIFICANT CHANGE UP (ref 0–0.5)
EOSINOPHIL NFR BLD AUTO: 0 % — SIGNIFICANT CHANGE UP (ref 0–6)
GIANT PLATELETS BLD QL SMEAR: PRESENT — SIGNIFICANT CHANGE UP
GLUCOSE BLDC GLUCOMTR-MCNC: 102 MG/DL — HIGH (ref 70–99)
GLUCOSE BLDC GLUCOMTR-MCNC: 126 MG/DL — HIGH (ref 70–99)
GLUCOSE BLDC GLUCOMTR-MCNC: 127 MG/DL — HIGH (ref 70–99)
GLUCOSE BLDC GLUCOMTR-MCNC: 153 MG/DL — HIGH (ref 70–99)
GLUCOSE SERPL-MCNC: 147 MG/DL — HIGH (ref 70–99)
HCT VFR BLD CALC: 22 % — LOW (ref 39–50)
HGB BLD-MCNC: 7.7 G/DL — LOW (ref 13–17)
HLX FLT3 FINAL REPORT: SIGNIFICANT CHANGE UP
IANC: 6.74 K/UL — SIGNIFICANT CHANGE UP (ref 1.8–7.4)
LYMPHOCYTES # BLD AUTO: 1.43 K/UL — SIGNIFICANT CHANGE UP (ref 1–3.3)
LYMPHOCYTES # BLD AUTO: 12.7 % — LOW (ref 13–44)
MACROCYTES BLD QL: SIGNIFICANT CHANGE UP
MAGNESIUM SERPL-MCNC: 1.5 MG/DL — LOW (ref 1.6–2.6)
MCHC RBC-ENTMCNC: 31 PG — SIGNIFICANT CHANGE UP (ref 27–34)
MCHC RBC-ENTMCNC: 35 GM/DL — SIGNIFICANT CHANGE UP (ref 32–36)
MCV RBC AUTO: 88.7 FL — SIGNIFICANT CHANGE UP (ref 80–100)
METAMYELOCYTES # FLD: 0.9 % — SIGNIFICANT CHANGE UP (ref 0–1)
MONOCYTES # BLD AUTO: 1.24 K/UL — HIGH (ref 0–0.9)
MONOCYTES NFR BLD AUTO: 11 % — SIGNIFICANT CHANGE UP (ref 2–14)
MYELOCYTES NFR BLD: 2.5 % — HIGH (ref 0–0)
NEUTROPHILS # BLD AUTO: 7.89 K/UL — HIGH (ref 1.8–7.4)
NEUTROPHILS NFR BLD AUTO: 70.3 % — SIGNIFICANT CHANGE UP (ref 43–77)
OVALOCYTES BLD QL SMEAR: SLIGHT — SIGNIFICANT CHANGE UP
PHOSPHATE SERPL-MCNC: 3.9 MG/DL — SIGNIFICANT CHANGE UP (ref 2.5–4.5)
PLAT MORPH BLD: ABNORMAL
PLATELET # BLD AUTO: 51 K/UL — LOW (ref 150–400)
PLATELET COUNT - ESTIMATE: ABNORMAL
POIKILOCYTOSIS BLD QL AUTO: SIGNIFICANT CHANGE UP
POLYCHROMASIA BLD QL SMEAR: SLIGHT — SIGNIFICANT CHANGE UP
POTASSIUM SERPL-MCNC: 5 MMOL/L — SIGNIFICANT CHANGE UP (ref 3.5–5.3)
POTASSIUM SERPL-SCNC: 5 MMOL/L — SIGNIFICANT CHANGE UP (ref 3.5–5.3)
PROT SERPL-MCNC: 4.6 G/DL — LOW (ref 6–8.3)
RBC # BLD: 2.48 M/UL — LOW (ref 4.2–5.8)
RBC # FLD: 22.4 % — HIGH (ref 10.3–14.5)
RBC BLD AUTO: ABNORMAL
SCHISTOCYTES BLD QL AUTO: SLIGHT — SIGNIFICANT CHANGE UP
SODIUM SERPL-SCNC: 133 MMOL/L — LOW (ref 135–145)
TARGETS BLD QL SMEAR: SLIGHT — SIGNIFICANT CHANGE UP
VARIANT LYMPHS # BLD: 2.6 % — SIGNIFICANT CHANGE UP (ref 0–6)
WBC # BLD: 11.23 K/UL — HIGH (ref 3.8–10.5)
WBC # FLD AUTO: 11.23 K/UL — HIGH (ref 3.8–10.5)

## 2024-06-28 PROCEDURE — 99232 SBSQ HOSP IP/OBS MODERATE 35: CPT | Mod: GC

## 2024-06-28 RX ORDER — SODIUM CHLORIDE 0.9 % (FLUSH) 0.9 %
1000 SYRINGE (ML) INJECTION
Refills: 0 | Status: COMPLETED | OUTPATIENT
Start: 2024-06-28 | End: 2024-06-28

## 2024-06-28 RX ORDER — MAGNESIUM SULFATE 100 %
4 POWDER (GRAM) MISCELLANEOUS ONCE
Refills: 0 | Status: DISCONTINUED | OUTPATIENT
Start: 2024-06-28 | End: 2024-06-28

## 2024-06-28 RX ORDER — MAGNESIUM SULFATE 100 %
2 POWDER (GRAM) MISCELLANEOUS ONCE
Refills: 0 | Status: COMPLETED | OUTPATIENT
Start: 2024-06-28 | End: 2024-06-28

## 2024-06-28 RX ADMIN — INSULIN LISPRO 1: 100 INJECTION, SOLUTION SUBCUTANEOUS at 09:01

## 2024-06-28 RX ADMIN — Medication 1 TABLET(S): at 12:19

## 2024-06-28 RX ADMIN — PANTOPRAZOLE SODIUM 40 MILLIGRAM(S): 40 INJECTION, POWDER, FOR SOLUTION INTRAVENOUS at 05:20

## 2024-06-28 RX ADMIN — Medication 100 MILLILITER(S): at 12:21

## 2024-06-28 RX ADMIN — PANTOPRAZOLE SODIUM 40 MILLIGRAM(S): 40 INJECTION, POWDER, FOR SOLUTION INTRAVENOUS at 17:26

## 2024-06-28 RX ADMIN — Medication 25 GRAM(S): at 12:18

## 2024-06-28 RX ADMIN — ALLOPURINOL 100 MILLIGRAM(S): 300 TABLET ORAL at 12:19

## 2024-06-29 LAB
ALBUMIN SERPL ELPH-MCNC: 2.4 G/DL — LOW (ref 3.3–5)
ALP SERPL-CCNC: 734 U/L — HIGH (ref 40–120)
ALT FLD-CCNC: 124 U/L — HIGH (ref 4–41)
ANION GAP SERPL CALC-SCNC: 13 MMOL/L — SIGNIFICANT CHANGE UP (ref 7–14)
ANISOCYTOSIS BLD QL: SIGNIFICANT CHANGE UP
AST SERPL-CCNC: 110 U/L — HIGH (ref 4–40)
BASOPHILS # BLD AUTO: 0 K/UL — SIGNIFICANT CHANGE UP (ref 0–0.2)
BASOPHILS NFR BLD AUTO: 0 % — SIGNIFICANT CHANGE UP (ref 0–2)
BILIRUB SERPL-MCNC: 17.2 MG/DL — HIGH (ref 0.2–1.2)
BUN SERPL-MCNC: 34 MG/DL — HIGH (ref 7–23)
BURR CELLS BLD QL SMEAR: PRESENT — SIGNIFICANT CHANGE UP
BURR CELLS BLD QL SMEAR: SLIGHT — SIGNIFICANT CHANGE UP
CALCIUM SERPL-MCNC: 8.6 MG/DL — SIGNIFICANT CHANGE UP (ref 8.4–10.5)
CHLORIDE SERPL-SCNC: 107 MMOL/L — SIGNIFICANT CHANGE UP (ref 98–107)
CO2 SERPL-SCNC: 13 MMOL/L — LOW (ref 22–31)
CREAT SERPL-MCNC: 1.22 MG/DL — SIGNIFICANT CHANGE UP (ref 0.5–1.3)
EGFR: 66 ML/MIN/1.73M2 — SIGNIFICANT CHANGE UP
EOSINOPHIL # BLD AUTO: 0.1 K/UL — SIGNIFICANT CHANGE UP (ref 0–0.5)
EOSINOPHIL NFR BLD AUTO: 0.8 % — SIGNIFICANT CHANGE UP (ref 0–6)
GIANT PLATELETS BLD QL SMEAR: PRESENT — SIGNIFICANT CHANGE UP
GLUCOSE BLDC GLUCOMTR-MCNC: 102 MG/DL — HIGH (ref 70–99)
GLUCOSE BLDC GLUCOMTR-MCNC: 112 MG/DL — HIGH (ref 70–99)
GLUCOSE BLDC GLUCOMTR-MCNC: 127 MG/DL — HIGH (ref 70–99)
GLUCOSE BLDC GLUCOMTR-MCNC: 128 MG/DL — HIGH (ref 70–99)
GLUCOSE SERPL-MCNC: 101 MG/DL — HIGH (ref 70–99)
HCT VFR BLD CALC: 20.3 % — CRITICAL LOW (ref 39–50)
HGB BLD-MCNC: 7.2 G/DL — LOW (ref 13–17)
IANC: 8.08 K/UL — HIGH (ref 1.8–7.4)
LYMPHOCYTES # BLD AUTO: 17.4 % — SIGNIFICANT CHANGE UP (ref 13–44)
LYMPHOCYTES # BLD AUTO: 2.22 K/UL — SIGNIFICANT CHANGE UP (ref 1–3.3)
MAGNESIUM SERPL-MCNC: 1.4 MG/DL — LOW (ref 1.6–2.6)
MANUAL SMEAR VERIFICATION: SIGNIFICANT CHANGE UP
MCHC RBC-ENTMCNC: 30.9 PG — SIGNIFICANT CHANGE UP (ref 27–34)
MCHC RBC-ENTMCNC: 35.5 GM/DL — SIGNIFICANT CHANGE UP (ref 32–36)
MCV RBC AUTO: 87.1 FL — SIGNIFICANT CHANGE UP (ref 80–100)
MICROCYTES BLD QL: SLIGHT — SIGNIFICANT CHANGE UP
MONOCYTES # BLD AUTO: 1.56 K/UL — HIGH (ref 0–0.9)
MONOCYTES NFR BLD AUTO: 12.2 % — SIGNIFICANT CHANGE UP (ref 2–14)
MYELOCYTES NFR BLD: 0.9 % — HIGH (ref 0–0)
NEUTROPHILS # BLD AUTO: 8.66 K/UL — HIGH (ref 1.8–7.4)
NEUTROPHILS NFR BLD AUTO: 67.8 % — SIGNIFICANT CHANGE UP (ref 43–77)
OVALOCYTES BLD QL SMEAR: SLIGHT — SIGNIFICANT CHANGE UP
PHOSPHATE SERPL-MCNC: 4 MG/DL — SIGNIFICANT CHANGE UP (ref 2.5–4.5)
PLAT MORPH BLD: NORMAL — SIGNIFICANT CHANGE UP
PLATELET # BLD AUTO: 53 K/UL — LOW (ref 150–400)
PLATELET COUNT - ESTIMATE: ABNORMAL
POIKILOCYTOSIS BLD QL AUTO: SIGNIFICANT CHANGE UP
POTASSIUM SERPL-MCNC: 4.3 MMOL/L — SIGNIFICANT CHANGE UP (ref 3.5–5.3)
POTASSIUM SERPL-SCNC: 4.3 MMOL/L — SIGNIFICANT CHANGE UP (ref 3.5–5.3)
PROT SERPL-MCNC: 4.5 G/DL — LOW (ref 6–8.3)
RBC # BLD: 2.33 M/UL — LOW (ref 4.2–5.8)
RBC # FLD: 23 % — HIGH (ref 10.3–14.5)
RBC BLD AUTO: ABNORMAL
SCHISTOCYTES BLD QL AUTO: SLIGHT — SIGNIFICANT CHANGE UP
SODIUM SERPL-SCNC: 133 MMOL/L — LOW (ref 135–145)
TARGETS BLD QL SMEAR: SIGNIFICANT CHANGE UP
VARIANT LYMPHS # BLD: 0.9 % — SIGNIFICANT CHANGE UP (ref 0–6)
WBC # BLD: 12.78 K/UL — HIGH (ref 3.8–10.5)
WBC # FLD AUTO: 12.78 K/UL — HIGH (ref 3.8–10.5)

## 2024-06-29 PROCEDURE — 99233 SBSQ HOSP IP/OBS HIGH 50: CPT | Mod: GC

## 2024-06-29 RX ORDER — MAGNESIUM SULFATE 100 %
2 POWDER (GRAM) MISCELLANEOUS ONCE
Refills: 0 | Status: COMPLETED | OUTPATIENT
Start: 2024-06-29 | End: 2024-06-29

## 2024-06-29 RX ADMIN — ALLOPURINOL 100 MILLIGRAM(S): 300 TABLET ORAL at 11:07

## 2024-06-29 RX ADMIN — PANTOPRAZOLE SODIUM 40 MILLIGRAM(S): 40 INJECTION, POWDER, FOR SOLUTION INTRAVENOUS at 17:05

## 2024-06-29 RX ADMIN — Medication 25 GRAM(S): at 10:09

## 2024-06-29 RX ADMIN — PANTOPRAZOLE SODIUM 40 MILLIGRAM(S): 40 INJECTION, POWDER, FOR SOLUTION INTRAVENOUS at 05:42

## 2024-06-29 RX ADMIN — Medication 1 TABLET(S): at 11:07

## 2024-06-30 LAB
ALBUMIN SERPL ELPH-MCNC: 2.5 G/DL — LOW (ref 3.3–5)
ALP SERPL-CCNC: 725 U/L — HIGH (ref 40–120)
ALT FLD-CCNC: 116 U/L — HIGH (ref 4–41)
ANION GAP SERPL CALC-SCNC: 13 MMOL/L — SIGNIFICANT CHANGE UP (ref 7–14)
AST SERPL-CCNC: 124 U/L — HIGH (ref 4–40)
BASOPHILS # BLD AUTO: 0.03 K/UL — SIGNIFICANT CHANGE UP (ref 0–0.2)
BASOPHILS NFR BLD AUTO: 0.2 % — SIGNIFICANT CHANGE UP (ref 0–2)
BILIRUB SERPL-MCNC: 18.3 MG/DL — HIGH (ref 0.2–1.2)
BUN SERPL-MCNC: 34 MG/DL — HIGH (ref 7–23)
CALCIUM SERPL-MCNC: 8.5 MG/DL — SIGNIFICANT CHANGE UP (ref 8.4–10.5)
CHLORIDE SERPL-SCNC: 107 MMOL/L — SIGNIFICANT CHANGE UP (ref 98–107)
CO2 SERPL-SCNC: 12 MMOL/L — LOW (ref 22–31)
CREAT SERPL-MCNC: 1.11 MG/DL — SIGNIFICANT CHANGE UP (ref 0.5–1.3)
EGFR: 74 ML/MIN/1.73M2 — SIGNIFICANT CHANGE UP
EOSINOPHIL # BLD AUTO: 0.03 K/UL — SIGNIFICANT CHANGE UP (ref 0–0.5)
EOSINOPHIL NFR BLD AUTO: 0.2 % — SIGNIFICANT CHANGE UP (ref 0–6)
GLUCOSE BLDC GLUCOMTR-MCNC: 119 MG/DL — HIGH (ref 70–99)
GLUCOSE BLDC GLUCOMTR-MCNC: 124 MG/DL — HIGH (ref 70–99)
GLUCOSE BLDC GLUCOMTR-MCNC: 148 MG/DL — HIGH (ref 70–99)
GLUCOSE BLDC GLUCOMTR-MCNC: 178 MG/DL — HIGH (ref 70–99)
GLUCOSE SERPL-MCNC: 113 MG/DL — HIGH (ref 70–99)
HCT VFR BLD CALC: 20.2 % — CRITICAL LOW (ref 39–50)
HGB BLD-MCNC: 7.2 G/DL — LOW (ref 13–17)
IANC: 8.05 K/UL — HIGH (ref 1.8–7.4)
IMM GRANULOCYTES NFR BLD AUTO: 11 % — HIGH (ref 0–0.9)
LYMPHOCYTES # BLD AUTO: 1.9 K/UL — SIGNIFICANT CHANGE UP (ref 1–3.3)
LYMPHOCYTES # BLD AUTO: 14.5 % — SIGNIFICANT CHANGE UP (ref 13–44)
MAGNESIUM SERPL-MCNC: 1.3 MG/DL — LOW (ref 1.6–2.6)
MCHC RBC-ENTMCNC: 31 PG — SIGNIFICANT CHANGE UP (ref 27–34)
MCHC RBC-ENTMCNC: 35.6 GM/DL — SIGNIFICANT CHANGE UP (ref 32–36)
MCV RBC AUTO: 87.1 FL — SIGNIFICANT CHANGE UP (ref 80–100)
MONOCYTES # BLD AUTO: 1.64 K/UL — HIGH (ref 0–0.9)
MONOCYTES NFR BLD AUTO: 12.5 % — SIGNIFICANT CHANGE UP (ref 2–14)
NEUTROPHILS # BLD AUTO: 8.05 K/UL — HIGH (ref 1.8–7.4)
NEUTROPHILS NFR BLD AUTO: 61.6 % — SIGNIFICANT CHANGE UP (ref 43–77)
NRBC # BLD: 0 /100 WBCS — SIGNIFICANT CHANGE UP (ref 0–0)
NRBC # FLD: 0 K/UL — SIGNIFICANT CHANGE UP (ref 0–0)
PHOSPHATE SERPL-MCNC: 4 MG/DL — SIGNIFICANT CHANGE UP (ref 2.5–4.5)
PLATELET # BLD AUTO: 60 K/UL — LOW (ref 150–400)
POTASSIUM SERPL-MCNC: 4.3 MMOL/L — SIGNIFICANT CHANGE UP (ref 3.5–5.3)
POTASSIUM SERPL-SCNC: 4.3 MMOL/L — SIGNIFICANT CHANGE UP (ref 3.5–5.3)
PROT SERPL-MCNC: 4.8 G/DL — LOW (ref 6–8.3)
RBC # BLD: 2.32 M/UL — LOW (ref 4.2–5.8)
RBC # FLD: 23.5 % — HIGH (ref 10.3–14.5)
SODIUM SERPL-SCNC: 132 MMOL/L — LOW (ref 135–145)
WBC # BLD: 13.09 K/UL — HIGH (ref 3.8–10.5)
WBC # FLD AUTO: 13.09 K/UL — HIGH (ref 3.8–10.5)

## 2024-06-30 PROCEDURE — 99232 SBSQ HOSP IP/OBS MODERATE 35: CPT | Mod: GC

## 2024-06-30 RX ORDER — MAGNESIUM SULFATE 100 %
2 POWDER (GRAM) MISCELLANEOUS ONCE
Refills: 0 | Status: COMPLETED | OUTPATIENT
Start: 2024-06-30 | End: 2024-06-30

## 2024-06-30 RX ADMIN — Medication 25 GRAM(S): at 09:00

## 2024-06-30 RX ADMIN — ALLOPURINOL 100 MILLIGRAM(S): 300 TABLET ORAL at 12:59

## 2024-06-30 RX ADMIN — PANTOPRAZOLE SODIUM 40 MILLIGRAM(S): 40 INJECTION, POWDER, FOR SOLUTION INTRAVENOUS at 18:44

## 2024-06-30 RX ADMIN — PANTOPRAZOLE SODIUM 40 MILLIGRAM(S): 40 INJECTION, POWDER, FOR SOLUTION INTRAVENOUS at 05:28

## 2024-06-30 RX ADMIN — Medication 1 TABLET(S): at 12:59

## 2024-07-01 LAB
ALBUMIN SERPL ELPH-MCNC: 2.5 G/DL — LOW (ref 3.3–5)
ALP SERPL-CCNC: 673 U/L — HIGH (ref 40–120)
ALT FLD-CCNC: 113 U/L — HIGH (ref 4–41)
ANION GAP SERPL CALC-SCNC: 13 MMOL/L — SIGNIFICANT CHANGE UP (ref 7–14)
AST SERPL-CCNC: 134 U/L — HIGH (ref 4–40)
BASOPHILS # BLD AUTO: 0.04 K/UL — SIGNIFICANT CHANGE UP (ref 0–0.2)
BASOPHILS NFR BLD AUTO: 0.3 % — SIGNIFICANT CHANGE UP (ref 0–2)
BCR/ABL BY RT - PCR QUANTITATIVE: SIGNIFICANT CHANGE UP
BILIRUB SERPL-MCNC: 17.6 MG/DL — HIGH (ref 0.2–1.2)
BLD GP AB SCN SERPL QL: NEGATIVE — SIGNIFICANT CHANGE UP
BUN SERPL-MCNC: 31 MG/DL — HIGH (ref 7–23)
CALCIUM SERPL-MCNC: 8.4 MG/DL — SIGNIFICANT CHANGE UP (ref 8.4–10.5)
CHLORIDE SERPL-SCNC: 106 MMOL/L — SIGNIFICANT CHANGE UP (ref 98–107)
CO2 SERPL-SCNC: 14 MMOL/L — LOW (ref 22–31)
CREAT SERPL-MCNC: 1.16 MG/DL — SIGNIFICANT CHANGE UP (ref 0.5–1.3)
EGFR: 70 ML/MIN/1.73M2 — SIGNIFICANT CHANGE UP
EOSINOPHIL # BLD AUTO: 0.03 K/UL — SIGNIFICANT CHANGE UP (ref 0–0.5)
EOSINOPHIL NFR BLD AUTO: 0.2 % — SIGNIFICANT CHANGE UP (ref 0–6)
GLUCOSE BLDC GLUCOMTR-MCNC: 102 MG/DL — HIGH (ref 70–99)
GLUCOSE BLDC GLUCOMTR-MCNC: 113 MG/DL — HIGH (ref 70–99)
GLUCOSE BLDC GLUCOMTR-MCNC: 126 MG/DL — HIGH (ref 70–99)
GLUCOSE BLDC GLUCOMTR-MCNC: 156 MG/DL — HIGH (ref 70–99)
GLUCOSE SERPL-MCNC: 105 MG/DL — HIGH (ref 70–99)
HCT VFR BLD CALC: 19.3 % — CRITICAL LOW (ref 39–50)
HCT VFR BLD CALC: 22.3 % — LOW (ref 39–50)
HGB BLD-MCNC: 7 G/DL — CRITICAL LOW (ref 13–17)
HGB BLD-MCNC: 7.9 G/DL — LOW (ref 13–17)
IANC: 9.41 K/UL — HIGH (ref 1.8–7.4)
IMM GRANULOCYTES NFR BLD AUTO: 9.7 % — HIGH (ref 0–0.9)
INR BLD: 1.51 RATIO — HIGH (ref 0.85–1.18)
LYMPHOCYTES # BLD AUTO: 1.79 K/UL — SIGNIFICANT CHANGE UP (ref 1–3.3)
LYMPHOCYTES # BLD AUTO: 12.2 % — LOW (ref 13–44)
MAGNESIUM SERPL-MCNC: 1.3 MG/DL — LOW (ref 1.6–2.6)
MCHC RBC-ENTMCNC: 30.7 PG — SIGNIFICANT CHANGE UP (ref 27–34)
MCHC RBC-ENTMCNC: 31.3 PG — SIGNIFICANT CHANGE UP (ref 27–34)
MCHC RBC-ENTMCNC: 35.4 GM/DL — SIGNIFICANT CHANGE UP (ref 32–36)
MCHC RBC-ENTMCNC: 36.3 GM/DL — HIGH (ref 32–36)
MCV RBC AUTO: 86.2 FL — SIGNIFICANT CHANGE UP (ref 80–100)
MCV RBC AUTO: 86.8 FL — SIGNIFICANT CHANGE UP (ref 80–100)
MONOCYTES # BLD AUTO: 1.94 K/UL — HIGH (ref 0–0.9)
MONOCYTES NFR BLD AUTO: 13.3 % — SIGNIFICANT CHANGE UP (ref 2–14)
NEUTROPHILS # BLD AUTO: 9.41 K/UL — HIGH (ref 1.8–7.4)
NEUTROPHILS NFR BLD AUTO: 64.3 % — SIGNIFICANT CHANGE UP (ref 43–77)
NRBC # BLD: 0 /100 WBCS — SIGNIFICANT CHANGE UP (ref 0–0)
NRBC # BLD: 0 /100 WBCS — SIGNIFICANT CHANGE UP (ref 0–0)
NRBC # FLD: 0 K/UL — SIGNIFICANT CHANGE UP (ref 0–0)
NRBC # FLD: 0 K/UL — SIGNIFICANT CHANGE UP (ref 0–0)
PHOSPHATE SERPL-MCNC: 3.7 MG/DL — SIGNIFICANT CHANGE UP (ref 2.5–4.5)
PLATELET # BLD AUTO: 51 K/UL — LOW (ref 150–400)
PLATELET # BLD AUTO: 82 K/UL — LOW (ref 150–400)
POTASSIUM SERPL-MCNC: 4 MMOL/L — SIGNIFICANT CHANGE UP (ref 3.5–5.3)
POTASSIUM SERPL-SCNC: 4 MMOL/L — SIGNIFICANT CHANGE UP (ref 3.5–5.3)
PROT SERPL-MCNC: 4.7 G/DL — LOW (ref 6–8.3)
PROTHROM AB SERPL-ACNC: 16.9 SEC — HIGH (ref 9.5–13)
RBC # BLD: 2.24 M/UL — LOW (ref 4.2–5.8)
RBC # BLD: 2.57 M/UL — LOW (ref 4.2–5.8)
RBC # FLD: 23.5 % — HIGH (ref 10.3–14.5)
RBC # FLD: 24.4 % — HIGH (ref 10.3–14.5)
RH IG SCN BLD-IMP: POSITIVE — SIGNIFICANT CHANGE UP
SODIUM SERPL-SCNC: 133 MMOL/L — LOW (ref 135–145)
WBC # BLD: 14.63 K/UL — HIGH (ref 3.8–10.5)
WBC # BLD: 17.41 K/UL — HIGH (ref 3.8–10.5)
WBC # FLD AUTO: 14.63 K/UL — HIGH (ref 3.8–10.5)
WBC # FLD AUTO: 17.41 K/UL — HIGH (ref 3.8–10.5)

## 2024-07-01 PROCEDURE — 99233 SBSQ HOSP IP/OBS HIGH 50: CPT | Mod: GC

## 2024-07-01 RX ORDER — MAGNESIUM SULFATE 100 %
2 POWDER (GRAM) MISCELLANEOUS ONCE
Refills: 0 | Status: COMPLETED | OUTPATIENT
Start: 2024-07-01 | End: 2024-07-01

## 2024-07-01 RX ORDER — INSULIN LISPRO 100 [IU]/ML
INJECTION, SOLUTION SUBCUTANEOUS
Refills: 0 | Status: DISCONTINUED | OUTPATIENT
Start: 2024-07-01 | End: 2024-07-02

## 2024-07-01 RX ADMIN — Medication 1 TABLET(S): at 13:52

## 2024-07-01 RX ADMIN — INSULIN LISPRO 1: 100 INJECTION, SOLUTION SUBCUTANEOUS at 17:44

## 2024-07-01 RX ADMIN — ALLOPURINOL 100 MILLIGRAM(S): 300 TABLET ORAL at 13:52

## 2024-07-01 RX ADMIN — PANTOPRAZOLE SODIUM 40 MILLIGRAM(S): 40 INJECTION, POWDER, FOR SOLUTION INTRAVENOUS at 05:35

## 2024-07-01 RX ADMIN — Medication 25 GRAM(S): at 13:52

## 2024-07-01 RX ADMIN — PANTOPRAZOLE SODIUM 40 MILLIGRAM(S): 40 INJECTION, POWDER, FOR SOLUTION INTRAVENOUS at 17:45

## 2024-07-02 LAB
ALBUMIN SERPL ELPH-MCNC: 2.6 G/DL — LOW (ref 3.3–5)
ALP SERPL-CCNC: 696 U/L — HIGH (ref 40–120)
ALT FLD-CCNC: 104 U/L — HIGH (ref 4–41)
ANION GAP SERPL CALC-SCNC: 11 MMOL/L — SIGNIFICANT CHANGE UP (ref 7–14)
AST SERPL-CCNC: 113 U/L — HIGH (ref 4–40)
BILIRUB SERPL-MCNC: 18.1 MG/DL — HIGH (ref 0.2–1.2)
BUN SERPL-MCNC: 36 MG/DL — HIGH (ref 7–23)
CALCIUM SERPL-MCNC: 8.5 MG/DL — SIGNIFICANT CHANGE UP (ref 8.4–10.5)
CHLORIDE SERPL-SCNC: 107 MMOL/L — SIGNIFICANT CHANGE UP (ref 98–107)
CO2 SERPL-SCNC: 15 MMOL/L — LOW (ref 22–31)
CREAT SERPL-MCNC: 1.3 MG/DL — SIGNIFICANT CHANGE UP (ref 0.5–1.3)
DNA PLOIDY SPEC FC-IMP: SIGNIFICANT CHANGE UP
EGFR: 61 ML/MIN/1.73M2 — SIGNIFICANT CHANGE UP
GLUCOSE BLDC GLUCOMTR-MCNC: 106 MG/DL — HIGH (ref 70–99)
GLUCOSE BLDC GLUCOMTR-MCNC: 113 MG/DL — HIGH (ref 70–99)
GLUCOSE BLDC GLUCOMTR-MCNC: 128 MG/DL — HIGH (ref 70–99)
GLUCOSE BLDC GLUCOMTR-MCNC: 94 MG/DL — SIGNIFICANT CHANGE UP (ref 70–99)
GLUCOSE SERPL-MCNC: 99 MG/DL — SIGNIFICANT CHANGE UP (ref 70–99)
HCT VFR BLD CALC: 19 % — CRITICAL LOW (ref 39–50)
HCT VFR BLD CALC: 23.2 % — LOW (ref 39–50)
HGB BLD-MCNC: 6.8 G/DL — CRITICAL LOW (ref 13–17)
HGB BLD-MCNC: 8.3 G/DL — LOW (ref 13–17)
INR BLD: 1.56 RATIO — HIGH (ref 0.85–1.18)
MAGNESIUM SERPL-MCNC: 1.4 MG/DL — LOW (ref 1.6–2.6)
MCHC RBC-ENTMCNC: 30.7 PG — SIGNIFICANT CHANGE UP (ref 27–34)
MCHC RBC-ENTMCNC: 30.9 PG — SIGNIFICANT CHANGE UP (ref 27–34)
MCHC RBC-ENTMCNC: 35.8 GM/DL — SIGNIFICANT CHANGE UP (ref 32–36)
MCHC RBC-ENTMCNC: 35.8 GM/DL — SIGNIFICANT CHANGE UP (ref 32–36)
MCV RBC AUTO: 85.9 FL — SIGNIFICANT CHANGE UP (ref 80–100)
MCV RBC AUTO: 86.4 FL — SIGNIFICANT CHANGE UP (ref 80–100)
NRBC # BLD: 0 /100 WBCS — SIGNIFICANT CHANGE UP (ref 0–0)
NRBC # BLD: 0 /100 WBCS — SIGNIFICANT CHANGE UP (ref 0–0)
NRBC # FLD: 0 K/UL — SIGNIFICANT CHANGE UP (ref 0–0)
NRBC # FLD: 0 K/UL — SIGNIFICANT CHANGE UP (ref 0–0)
PHOSPHATE SERPL-MCNC: 4.1 MG/DL — SIGNIFICANT CHANGE UP (ref 2.5–4.5)
PLATELET # BLD AUTO: 77 K/UL — LOW (ref 150–400)
PLATELET # BLD AUTO: 86 K/UL — LOW (ref 150–400)
POTASSIUM SERPL-MCNC: 4.2 MMOL/L — SIGNIFICANT CHANGE UP (ref 3.5–5.3)
POTASSIUM SERPL-SCNC: 4.2 MMOL/L — SIGNIFICANT CHANGE UP (ref 3.5–5.3)
PROT SERPL-MCNC: 5 G/DL — LOW (ref 6–8.3)
PROTHROM AB SERPL-ACNC: 17.2 SEC — HIGH (ref 9.5–13)
RBC # BLD: 2.2 M/UL — LOW (ref 4.2–5.8)
RBC # BLD: 2.7 M/UL — LOW (ref 4.2–5.8)
RBC # FLD: 22.7 % — HIGH (ref 10.3–14.5)
RBC # FLD: 23.8 % — HIGH (ref 10.3–14.5)
SODIUM SERPL-SCNC: 133 MMOL/L — LOW (ref 135–145)
WBC # BLD: 15.17 K/UL — HIGH (ref 3.8–10.5)
WBC # BLD: 15.92 K/UL — HIGH (ref 3.8–10.5)
WBC # FLD AUTO: 15.17 K/UL — HIGH (ref 3.8–10.5)
WBC # FLD AUTO: 15.92 K/UL — HIGH (ref 3.8–10.5)

## 2024-07-02 PROCEDURE — 99233 SBSQ HOSP IP/OBS HIGH 50: CPT | Mod: GC

## 2024-07-02 RX ORDER — INSULIN LISPRO 100 [IU]/ML
INJECTION, SOLUTION SUBCUTANEOUS AT BEDTIME
Refills: 0 | Status: DISCONTINUED | OUTPATIENT
Start: 2024-07-02 | End: 2024-07-03

## 2024-07-02 RX ORDER — INSULIN LISPRO 100 [IU]/ML
INJECTION, SOLUTION SUBCUTANEOUS EVERY 6 HOURS
Refills: 0 | Status: DISCONTINUED | OUTPATIENT
Start: 2024-07-02 | End: 2024-07-02

## 2024-07-02 RX ORDER — INSULIN LISPRO 100 [IU]/ML
INJECTION, SOLUTION SUBCUTANEOUS
Refills: 0 | Status: DISCONTINUED | OUTPATIENT
Start: 2024-07-02 | End: 2024-07-03

## 2024-07-02 RX ORDER — MAGNESIUM SULFATE 100 %
2 POWDER (GRAM) MISCELLANEOUS ONCE
Refills: 0 | Status: COMPLETED | OUTPATIENT
Start: 2024-07-02 | End: 2024-07-02

## 2024-07-02 RX ADMIN — PANTOPRAZOLE SODIUM 40 MILLIGRAM(S): 40 INJECTION, POWDER, FOR SOLUTION INTRAVENOUS at 07:01

## 2024-07-02 RX ADMIN — ALLOPURINOL 100 MILLIGRAM(S): 300 TABLET ORAL at 12:25

## 2024-07-02 RX ADMIN — PANTOPRAZOLE SODIUM 40 MILLIGRAM(S): 40 INJECTION, POWDER, FOR SOLUTION INTRAVENOUS at 17:57

## 2024-07-02 RX ADMIN — Medication 1 TABLET(S): at 12:24

## 2024-07-02 RX ADMIN — Medication 25 GRAM(S): at 12:28

## 2024-07-03 ENCOUNTER — TRANSCRIPTION ENCOUNTER (OUTPATIENT)
Age: 64
End: 2024-07-03

## 2024-07-03 ENCOUNTER — RESULT REVIEW (OUTPATIENT)
Age: 64
End: 2024-07-03

## 2024-07-03 VITALS
DIASTOLIC BLOOD PRESSURE: 60 MMHG | HEART RATE: 88 BPM | RESPIRATION RATE: 17 BRPM | SYSTOLIC BLOOD PRESSURE: 130 MMHG | TEMPERATURE: 98 F | OXYGEN SATURATION: 100 %

## 2024-07-03 LAB
ACANTHOCYTES BLD QL SMEAR: SLIGHT — SIGNIFICANT CHANGE UP
ALBUMIN SERPL ELPH-MCNC: 2.7 G/DL — LOW (ref 3.3–5)
ALP SERPL-CCNC: 650 U/L — HIGH (ref 40–120)
ALT FLD-CCNC: 95 U/L — HIGH (ref 4–41)
ANION GAP SERPL CALC-SCNC: 13 MMOL/L — SIGNIFICANT CHANGE UP (ref 7–14)
ANISOCYTOSIS BLD QL: SIGNIFICANT CHANGE UP
APTT BLD: 39.6 SEC — HIGH (ref 24.5–35.6)
AST SERPL-CCNC: 96 U/L — HIGH (ref 4–40)
BASOPHILS # BLD AUTO: 0 K/UL — SIGNIFICANT CHANGE UP (ref 0–0.2)
BASOPHILS NFR BLD AUTO: 0 % — SIGNIFICANT CHANGE UP (ref 0–2)
BILIRUB SERPL-MCNC: 21.5 MG/DL — HIGH (ref 0.2–1.2)
BUN SERPL-MCNC: 35 MG/DL — HIGH (ref 7–23)
CALCIUM SERPL-MCNC: 8.7 MG/DL — SIGNIFICANT CHANGE UP (ref 8.4–10.5)
CALRETICULIN INTERPRETATION: SIGNIFICANT CHANGE UP
CHLORIDE SERPL-SCNC: 108 MMOL/L — HIGH (ref 98–107)
CHROM ANALY OVERALL INTERP SPEC-IMP: SIGNIFICANT CHANGE UP
CO2 SERPL-SCNC: 14 MMOL/L — LOW (ref 22–31)
CREAT SERPL-MCNC: 1.18 MG/DL — SIGNIFICANT CHANGE UP (ref 0.5–1.3)
DACRYOCYTES BLD QL SMEAR: SLIGHT — SIGNIFICANT CHANGE UP
EGFR: 68 ML/MIN/1.73M2 — SIGNIFICANT CHANGE UP
EOSINOPHIL # BLD AUTO: 0.16 K/UL — SIGNIFICANT CHANGE UP (ref 0–0.5)
EOSINOPHIL NFR BLD AUTO: 0.9 % — SIGNIFICANT CHANGE UP (ref 0–6)
GIANT PLATELETS BLD QL SMEAR: PRESENT — SIGNIFICANT CHANGE UP
GLUCOSE BLDC GLUCOMTR-MCNC: 109 MG/DL — HIGH (ref 70–99)
GLUCOSE BLDC GLUCOMTR-MCNC: 109 MG/DL — HIGH (ref 70–99)
GLUCOSE BLDC GLUCOMTR-MCNC: 162 MG/DL — HIGH (ref 70–99)
GLUCOSE BLDC GLUCOMTR-MCNC: 169 MG/DL — HIGH (ref 70–99)
GLUCOSE BLDC GLUCOMTR-MCNC: 191 MG/DL — HIGH (ref 70–99)
GLUCOSE SERPL-MCNC: 101 MG/DL — HIGH (ref 70–99)
HCT VFR BLD CALC: 21.4 % — LOW (ref 39–50)
HGB BLD-MCNC: 7.8 G/DL — LOW (ref 13–17)
HYPOCHROMIA BLD QL: SLIGHT — SIGNIFICANT CHANGE UP
IANC: 11.06 K/UL — HIGH (ref 1.8–7.4)
INR BLD: 1.27 RATIO — HIGH (ref 0.85–1.18)
JAK2 P.V617F BLD/T QL: SIGNIFICANT CHANGE UP
LYMPHOCYTES # BLD AUTO: 1.57 K/UL — SIGNIFICANT CHANGE UP (ref 1–3.3)
LYMPHOCYTES # BLD AUTO: 8.8 % — LOW (ref 13–44)
MACROCYTES BLD QL: SIGNIFICANT CHANGE UP
MAGNESIUM SERPL-MCNC: 1.6 MG/DL — SIGNIFICANT CHANGE UP (ref 1.6–2.6)
MANUAL SMEAR VERIFICATION: SIGNIFICANT CHANGE UP
MCHC RBC-ENTMCNC: 30.7 PG — SIGNIFICANT CHANGE UP (ref 27–34)
MCHC RBC-ENTMCNC: 36.4 GM/DL — HIGH (ref 32–36)
MCV RBC AUTO: 84.3 FL — SIGNIFICANT CHANGE UP (ref 80–100)
MONOCYTES # BLD AUTO: 2.49 K/UL — HIGH (ref 0–0.9)
MONOCYTES NFR BLD AUTO: 14 % — SIGNIFICANT CHANGE UP (ref 2–14)
MYELOCYTES NFR BLD: 2.6 % — HIGH (ref 0–0)
NEUTROPHILS # BLD AUTO: 12.97 K/UL — HIGH (ref 1.8–7.4)
NEUTROPHILS NFR BLD AUTO: 70.2 % — SIGNIFICANT CHANGE UP (ref 43–77)
NEUTS BAND # BLD: 2.6 % — SIGNIFICANT CHANGE UP (ref 0–6)
OVALOCYTES BLD QL SMEAR: SLIGHT — SIGNIFICANT CHANGE UP
PHOSPHATE SERPL-MCNC: 3.7 MG/DL — SIGNIFICANT CHANGE UP (ref 2.5–4.5)
PLAT MORPH BLD: NORMAL — SIGNIFICANT CHANGE UP
PLATELET # BLD AUTO: 98 K/UL — LOW (ref 150–400)
PLATELET COUNT - ESTIMATE: ABNORMAL
POIKILOCYTOSIS BLD QL AUTO: SIGNIFICANT CHANGE UP
POLYCHROMASIA BLD QL SMEAR: SLIGHT — SIGNIFICANT CHANGE UP
POTASSIUM SERPL-MCNC: 3.9 MMOL/L — SIGNIFICANT CHANGE UP (ref 3.5–5.3)
POTASSIUM SERPL-SCNC: 3.9 MMOL/L — SIGNIFICANT CHANGE UP (ref 3.5–5.3)
PROT SERPL-MCNC: 5.2 G/DL — LOW (ref 6–8.3)
PROTHROM AB SERPL-ACNC: 14.3 SEC — HIGH (ref 9.5–13)
RBC # BLD: 2.54 M/UL — LOW (ref 4.2–5.8)
RBC # FLD: 22.8 % — HIGH (ref 10.3–14.5)
RBC BLD AUTO: ABNORMAL
SODIUM SERPL-SCNC: 135 MMOL/L — SIGNIFICANT CHANGE UP (ref 135–145)
TARGETS BLD QL SMEAR: SIGNIFICANT CHANGE UP
VARIANT LYMPHS # BLD: 0.9 % — SIGNIFICANT CHANGE UP (ref 0–6)
WBC # BLD: 17.81 K/UL — HIGH (ref 3.8–10.5)
WBC # FLD AUTO: 17.81 K/UL — HIGH (ref 3.8–10.5)

## 2024-07-03 PROCEDURE — 43238 EGD US FINE NEEDLE BX/ASPIR: CPT | Mod: GC

## 2024-07-03 PROCEDURE — 99232 SBSQ HOSP IP/OBS MODERATE 35: CPT | Mod: GC

## 2024-07-03 PROCEDURE — 88341 IMHCHEM/IMCYTCHM EA ADD ANTB: CPT | Mod: 26

## 2024-07-03 PROCEDURE — 43262 ENDO CHOLANGIOPANCREATOGRAPH: CPT | Mod: GC

## 2024-07-03 PROCEDURE — 88112 CYTOPATH CELL ENHANCE TECH: CPT | Mod: 26

## 2024-07-03 PROCEDURE — 43261 ENDO CHOLANGIOPANCREATOGRAPH: CPT | Mod: GC

## 2024-07-03 PROCEDURE — 88342 IMHCHEM/IMCYTCHM 1ST ANTB: CPT | Mod: 26

## 2024-07-03 PROCEDURE — 43239 EGD BIOPSY SINGLE/MULTIPLE: CPT | Mod: GC,59

## 2024-07-03 PROCEDURE — 99239 HOSP IP/OBS DSCHRG MGMT >30: CPT | Mod: GC

## 2024-07-03 PROCEDURE — 88307 TISSUE EXAM BY PATHOLOGIST: CPT | Mod: 26

## 2024-07-03 PROCEDURE — 74328 X-RAY BILE DUCT ENDOSCOPY: CPT | Mod: 26

## 2024-07-03 PROCEDURE — 88305 TISSUE EXAM BY PATHOLOGIST: CPT | Mod: 26

## 2024-07-03 PROCEDURE — 88313 SPECIAL STAINS GROUP 2: CPT | Mod: 26

## 2024-07-03 DEVICE — BLLN EXTRCTR PRO RX-S 9-12MM: Type: IMPLANTABLE DEVICE | Status: FUNCTIONAL

## 2024-07-03 DEVICE — HYDRATOME 44: Type: IMPLANTABLE DEVICE | Status: FUNCTIONAL

## 2024-07-03 RX ORDER — FENTANYL CITRATE 50 UG/ML
50 INJECTION, SOLUTION INTRAMUSCULAR; INTRAVENOUS ONCE
Refills: 0 | Status: DISCONTINUED | OUTPATIENT
Start: 2024-07-03 | End: 2024-07-03

## 2024-07-03 RX ORDER — FENTANYL CITRATE 50 UG/ML
25 INJECTION, SOLUTION INTRAMUSCULAR; INTRAVENOUS
Refills: 0 | Status: DISCONTINUED | OUTPATIENT
Start: 2024-07-03 | End: 2024-07-03

## 2024-07-03 RX ORDER — HEPARIN SODIUM,PORCINE/PF 10 UNIT/ML
100 SYRINGE (ML) INTRAVENOUS ONCE
Refills: 0 | Status: DISCONTINUED | OUTPATIENT
Start: 2024-07-03 | End: 2024-07-03

## 2024-07-03 RX ORDER — INSULIN LISPRO 100 [IU]/ML
INJECTION, SOLUTION SUBCUTANEOUS EVERY 6 HOURS
Refills: 0 | Status: DISCONTINUED | OUTPATIENT
Start: 2024-07-03 | End: 2024-07-03

## 2024-07-03 RX ORDER — ONDANSETRON HYDROCHLORIDE 2 MG/ML
4 INJECTION INTRAMUSCULAR; INTRAVENOUS ONCE
Refills: 0 | Status: DISCONTINUED | OUTPATIENT
Start: 2024-07-03 | End: 2024-07-03

## 2024-07-03 RX ADMIN — PANTOPRAZOLE SODIUM 40 MILLIGRAM(S): 40 INJECTION, POWDER, FOR SOLUTION INTRAVENOUS at 05:29

## 2024-07-03 RX ADMIN — ALLOPURINOL 100 MILLIGRAM(S): 300 TABLET ORAL at 12:50

## 2024-07-03 RX ADMIN — Medication 1 TABLET(S): at 12:50

## 2024-07-04 LAB — TM INTERPRETATION: SIGNIFICANT CHANGE UP

## 2024-07-05 LAB
DNA PLOIDY SPEC FC-IMP: SIGNIFICANT CHANGE UP
HEMATOPATHOLOGY REPORT: SIGNIFICANT CHANGE UP
ONKOSIGHT MYELOID SEQUENCE: (no result)

## 2024-07-08 LAB — NON-GYNECOLOGICAL CYTOLOGY STUDY: SIGNIFICANT CHANGE UP

## 2024-07-09 LAB — CHROM ANALY INTERPHASE BLD FISH-IMP: SIGNIFICANT CHANGE UP

## 2024-07-12 LAB — CHROM ANALY OVERALL INTERP SPEC-IMP: SIGNIFICANT CHANGE UP

## 2024-07-17 LAB — CHROM ANALY INTERPHASE BLD FISH-IMP: SIGNIFICANT CHANGE UP

## 2024-07-18 LAB — CHROM ANALY INTERPHASE BLD FISH-IMP: SIGNIFICANT CHANGE UP

## 2025-04-09 NOTE — ADVANCED PRACTICE NURSE CONSULT - RECOMMEDATIONS
Please reconsult if any wound changes or if we can be of further assistance (ext [phone]). 
Writer left message regarding date and time of upcoming appointment.   Writer advised patient to hold antihistamine medication 5 days prior; if applicable. Writer sent directions to [facility], if applicable.  Office number provided.    
Please contact [facility] surgery ([phone]) if bleeding noted for cauterization.     Please reconsult if any wound changes or if we can be of further assistance (ext [phone]). 
Follow up with surgery team.    Topical recommendations:   ---LLQ ostomy: GENTLY remove wafer and pouch. Cleanse with normal saline, pat dry. Apply liquid barrier film to INTACT periwound skin, allow to dry. Cover ulcerations with hydrocolloid dressing. Apply wafer # [id number] cut to 1 5/8", and pouch # [id number]. Change every 3 days and PRN if leaking.     Supplies recommended:  hydrocolloid dressing   Liquid barrier film    Flat waffer (2 3/4")- item # [id number]  Drainable pouch #[id number]    Plan discussed with patient and primary RN Mariza.   Please contact Wound/Ostomy Care Service Line if we can be of further assistance (ext [phone]). Please reconsult if changes to tissue type noted.
